# Patient Record
Sex: FEMALE | Race: BLACK OR AFRICAN AMERICAN | NOT HISPANIC OR LATINO | Employment: UNEMPLOYED | URBAN - METROPOLITAN AREA
[De-identification: names, ages, dates, MRNs, and addresses within clinical notes are randomized per-mention and may not be internally consistent; named-entity substitution may affect disease eponyms.]

---

## 2017-01-10 ENCOUNTER — OFFICE VISIT (OUTPATIENT)
Dept: DERMATOLOGY | Facility: CLINIC | Age: 47
End: 2017-01-10
Payer: COMMERCIAL

## 2017-01-10 VITALS — HEART RATE: 76 BPM | SYSTOLIC BLOOD PRESSURE: 125 MMHG | DIASTOLIC BLOOD PRESSURE: 79 MMHG | OXYGEN SATURATION: 99 %

## 2017-01-10 DIAGNOSIS — L71.0 DERMATITIS, PERIORAL: Primary | ICD-10-CM

## 2017-01-10 PROCEDURE — 99203 OFFICE O/P NEW LOW 30 MIN: CPT | Performed by: PHYSICIAN ASSISTANT

## 2017-01-10 RX ORDER — DESONIDE 0.5 MG/G
CREAM TOPICAL
Qty: 60 G | Refills: 1 | Status: SHIPPED | OUTPATIENT
Start: 2017-01-10 | End: 2017-10-09

## 2017-01-10 NOTE — NURSING NOTE
"Initial /79 mmHg  Pulse 76  SpO2 99% Estimated body mass index is 26.95 kg/(m^2) as calculated from the following:    Height as of 12/7/16: 1.626 m (5' 4.02\").    Weight as of 12/7/16: 71.26 kg (157 lb 1.6 oz). .      "

## 2017-01-10 NOTE — MR AVS SNAPSHOT
After Visit Summary   1/10/2017    Delio Agrawal    MRN: [id number]           Patient Information     Date Of Birth          1970        Visit Information        Provider Department      1/10/2017 2:30 PM Sarahi Dominguez PA-C [facility]        Today's Diagnoses     Dermatitis, perioral    -  1       Care Instructions    Directions for perioral dermatitis:    1. Wash with Dove morning and night  2. Apply desonide cream to area around mouth - morning and night  3. Use CeraVe cream, Cetaphil cream or Vanicream over this - use this to face and body indefinitely        Follow-ups after your visit        Your next 10 appointments already scheduled     Alan 10, 2017  2:30 PM   New Visit with Sarahi Dominguez PA-C   [facility] ([facility])    [address] [place] [id number] [phone]            Jan 12, 2017  3:00 PM   (Arrive by 2:45 PM)   MA DIAGNOSTIC DIGITAL RIGHT with [email or web address]   [facility] ([facility])    [address] [place] [id number] [phone]           Do not use any powder, lotion or deodorant under your arms or on your breast. If you do, we will ask you to remove it before your exam.  Wear comfortable, two-piece clothing.  If you have any allergies, tell your care team.  Bring any previous mammograms from other facilities or have them mailed to the breast center.            Jan 12, 2017  3:30 PM   US BREAST RIGHT LIMITED 1-3 QUAD with [email or web address]   [facility] ([facility])    [address] [place] [id number] [phone]           Please bring a list of your medicines (including vitamins, minerals and over-the-counter drugs). Also, tell your doctor about any allergies you may have. Wear comfortable clothes and leave your valuables at home.  You do not need  "to do anything special to prepare for your exam.  Please call the Imaging Department at your exam site with any questions.              Who to contact     If you have questions or need follow up information about today's clinic visit or your schedule please contact [facility] directly at [phone].  Normal or non-critical lab and imaging results will be communicated to you by MyChart, letter or phone within 4 business days after the clinic has received the results. If you do not hear from us within 7 days, please contact the clinic through MyChart or phone. If you have a critical or abnormal lab result, we will notify you by phone as soon as possible.  Submit refill requests through [employer] or call your pharmacy and they will forward the refill request to us. Please allow 3 business days for your refill to be completed.          Additional Information About Your Visit        MyChart Information     [employer] lets you send messages to your doctor, view your test results, renew your prescriptions, schedule appointments and more. To sign up, go to [email or web address] . Click on \"Log in\" on the left side of the screen, which will take you to the Welcome page. Then click on \"Sign up Now\" on the right side of the page.     You will be asked to enter the access code listed below, as well as some personal information. Please follow the directions to create your username and password.     Your access code is: [email or web address]  Expires: 2017  9:30 AM     Your access code will  in 90 days. If you need help or a new code, please call your [facility] clinic or [phone].        Care EveryWhere ID     This is your Care EveryWhere ID. This could be used by other organizations to access your [facility] medical records  [id number]        Your Vitals Were     Pulse Pulse Oximetry                76 99%           Blood Pressure from Last 3 Encounters:   01/10/17 125/79   16 109/66   16 " 122/70    Weight from Last 3 Encounters:   12/07/16 71.26 kg (157 lb 1.6 oz)   06/01/16 68.584 kg (151 lb 3.2 oz)   03/17/16 69.264 kg (152 lb 11.2 oz)              Today, you had the following     No orders found for display         Today's Medication Changes          These changes are accurate as of: 1/10/17  2:25 PM.  If you have any questions, ask your nurse or doctor.               Start taking these medicines.        Dose/Directions    desonide 0.05 % cream   Commonly known as:  DESOWEN   Used for:  Dermatitis, perioral   Started by:  Sarahi Dominguez PA-C        Apply to face BID x 1 week then d/c   Quantity:  60 g   Refills:  1            Where to get your medicines      These medications were sent to [facility] [place], [place] - [address]  [address], [facility] [address]     Phone:  [phone]    - desonide 0.05 % cream             Primary Care Provider Office Phone # Fax #    Margareth Bull [phone]       [facility] [address] [facility] [address]  [facility] [address]        Thank you!     Thank you for choosing [facility]  for your care. Our goal is always to provide you with excellent care. Hearing back from our patients is one way we can continue to improve our services. Please take a few minutes to complete the written survey that you may receive in the mail after your visit with us. Thank you!             Your Updated Medication List - Protect others around you: Learn how to safely use, store and throw away your medicines at www.disposemymeds.org.          This list is accurate as of: 1/10/17  2:25 PM.  Always use your most recent med list.                   Brand Name Dispense Instructions for use    desonide 0.05 % cream    DESOWEN    60 g    Apply to face BID x 1 week then d/c

## 2017-01-10 NOTE — PATIENT INSTRUCTIONS
Directions for perioral dermatitis:    1. Wash with Dove morning and night  2. Apply desonide cream to area around mouth - morning and night  3. Use CeraVe cream, Cetaphil cream or Vanicream over this - use this to face and body indefinitely

## 2017-01-12 NOTE — PROGRESS NOTES
HPI:   Delio Agrawal is a 46 year old female who presents for evaluation of rash on face  chief complaint  Location: lower face and can be on forehead as well. Is flaky and itchy   Condition present for:  For 1 year - will flare but never get completely better.   Previous treatments include: none  -Moved here from [place] 1 year ago - used to have [place]ese oil readily available and would apply this to her skin.  -Here today with  who is a surgical tech at [employer]    Review Of Systems  Eyes: negative  Ears/Nose/Throat: negative  Respiratory: No shortness of breath, dyspnea on exertion, cough, or hemoptysis  Cardiovascular: negative  Gastrointestinal: negative  Genitourinary: negative  Musculoskeletal: negative  Neurologic: negative  Psychiatric: negative        PHYSICAL EXAM:      Skin exam performed as follows: Type 2 skin. Mood appropriate  Alert and Oriented X 3. Well developed, well nourished in no distress.  General appearance: Normal  Head including face: Normal  Eyes: conjunctiva and lids: Normal  Mouth: Lips, teeth, gums: Normal  Neck: Normal  Chest-breast/axillae: Normal  Back: Normal  Spleen and liver: Normal  Cardiovascular: Exam of peripheral vascular system by observation for swelling, varicosities, edema: Normal  Genitalia: groin, buttocks: Normal  Extremities: digits/nails (clubbing): Normal  Eccrine and Apocrine glands: Normal  Right upper extremity: Normal  Left upper extremity: Normal  Right lower extremity: Normal  Left lower extremity: Normal  Skin: Scalp and body hair: See below    1. Perioral papular eruption and xerosis    ASSESSMENT/PLAN:     Perioral dermatitis - advised on diagnosis and treatment options. Has tried nothing in the past. Discussed PO and topical medications. Would like to start with topicals first; if no better will consider prednisone and/or doxy.   --Start desonide cream BID x 1 week then d/c  --Start CeraVe cream daily to face and body  --Continue washing with  Dove soap BID          Follow-up: 3-4 weeks/PRN sooner  CC:   Scribed By: Sarahi Dominguez, MS, PA-C

## 2017-01-24 ENCOUNTER — TELEPHONE (OUTPATIENT)
Dept: MAMMOGRAPHY | Facility: CLINIC | Age: 47
End: 2017-01-24

## 2017-05-11 ENCOUNTER — OFFICE VISIT (OUTPATIENT)
Dept: URGENT CARE | Facility: URGENT CARE | Age: 47
End: 2017-05-11
Payer: COMMERCIAL

## 2017-05-11 VITALS
WEIGHT: 164 LBS | SYSTOLIC BLOOD PRESSURE: 114 MMHG | HEART RATE: 85 BPM | OXYGEN SATURATION: 98 % | BODY MASS INDEX: 28.14 KG/M2 | TEMPERATURE: 97.1 F | DIASTOLIC BLOOD PRESSURE: 70 MMHG

## 2017-05-11 DIAGNOSIS — H61.22 LEFT EAR IMPACTED CERUMEN: ICD-10-CM

## 2017-05-11 DIAGNOSIS — R42 VERTIGO: Primary | ICD-10-CM

## 2017-05-11 PROCEDURE — 99214 OFFICE O/P EST MOD 30 MIN: CPT | Performed by: EMERGENCY MEDICINE

## 2017-05-11 RX ORDER — MECLIZINE HYDROCHLORIDE 25 MG/1
25 TABLET ORAL EVERY 6 HOURS PRN
Qty: 30 TABLET | Refills: 0 | Status: SHIPPED | OUTPATIENT
Start: 2017-05-11 | End: 2017-10-09

## 2017-05-11 NOTE — MR AVS SNAPSHOT
After Visit Summary   5/11/2017    Delio Agrawal    MRN: [id number]           Patient Information     Date Of Birth          1970        Visit Information        Provider Department      5/11/2017 8:30 PM Fransisco Ortiz MD [facility]        Today's Diagnoses     Vertigo    -  1    Left ear impacted cerumen          Care Instructions      Earwax (Treated)    Everyone produces earwax from the lining of the ear canal. It lubricates and protects the ear. The wax that forms in the canal slowly moves toward the outside of the ear and falls out. Sometimes wax can build up in the ear canal. This can cause a blockage and loss of hearing. A buildup of earwax was removed from your ear today.  Home care  If you have a tendency to build up wax in the ear canal, you should clear the wax at home regularly, before it causes discomfort. This should be about once every six months.    Unless a medicine was prescribed, you may use an over-the-counter product made for clearing earwax. These contain carbamide peroxide and are available over-the-counter in a kit with a small bulb syringe.    Lie down with the blocked ear facing upward. Apply one dropper full of medicine and wait a few minutes. Grasp the outer ear and wiggle it to help the solution enter the canal.    Lean over a sink or basin with the blocked ear turned downward. Use a rubber bulb syringe filled with warm (not hot or cold) water to rinse the ear several times. Use gentle pressure only. You may need to repeat the irrigation several times before the wax flows out.    If you are having trouble draining all the water out of your ear canal, put a few drops of rubbing alcohol into the ear canal. This will help remove the remaining water.  Don'ts    Don t use cold water to rinse the ear. This will make you dizzy.    Don t perform this procedure if you have an ear infection (ear pain, fever, or fluid draining from the ear).    Don t  perform this procedure if you have a punctured eardrum.    Don t use cotton swabs, matches, hairpins, keys, or other objects to  clean  the ear canal. This can cause infection of the ear canal or rupture of the eardrum. Because of their size and shape, cotton swabs can push the earwax deeper into the ear canal instead of removing it.  Follow-up care  Follow up with your health care provider, or as advised.  When to seek medical advice  Call your health care provider right away if any of these occur:    Worsening ear pain    Fever of 101 F (38.3 C) or higher, or as directed by your health care provider    Hearing does not return to normal after three days of treatment    Fluid drainage or bleeding from the ear canal    Swelling, redness, or tenderness of the outer ear    Headache, neck pain, or stiff neck    7522-5692 [employer]. [address] [place] [address]. All rights reserved. This information is not intended as a substitute for professional medical care. Always follow your healthcare professional's instructions.        Inner Ear Problems: Causes of Dizziness (Vertigo)  Benign positional vertigo (BPV)    This is the most common cause of vertigo. BPV (also called benign positional paroxysmal vertigo or BPPV) results when crystals in the canals shift into the wrong position. Episodes usually occur when the head is moved in a certain way. This can happen when turning in bed, bending, or looking up.  BPV:    Causes episodes of vertigo that last for seconds. These episodes can occur several times a day, depending on body position.    Doesn t cause hearing loss.    Often goes away on its own, but may go away sooner with treatment.  Infection or inflammation    Sometimes the semicircular canals swell and send incorrect balance signals. This problem may be caused by a viral infection. Depending on the cause, hearing can be affected (labyrinthitis) or can remain normal (neuronitis).   Infection  or inflammation:    Causes episodes of vertigo that last for hours or days. The first episode is usually the worst.    Can cause hearing loss.    Often goes away on its own, but may go away sooner with treatment.    May require vestibular rehabilitation if you have persistent imbalance.  Meniere s disease    Although uncommon, this condition happens when there is too much fluid in the canals. This causes increased pressure and swelling, and affects balance and hearing signals.  Meniere s disease may:    Cause episodes of vertigo that last for hours.    Cause fluctuating hearing problems, usually in one ear, that worsen over time.    Cause buzzing or ringing in the ears (tinnitus).    Cause a feeling of fullness or pressure in the ear.    Cause any of these symptoms: vertigo, hearing loss, tinnitus, or ear fullness to last a lifetime.    4646-0922 [employer]. [address] [place] [address]. All rights reserved. This information is not intended as a substitute for professional medical care. Always follow your healthcare professional's instructions.        Impacted Earwax  Impacted earwax is a buildup of the natural wax in the ear (cerumen). Impacted earwax is very common. It can cause symptoms such as hearing loss. It can also stop a doctor doing an exam of your ear.  Understanding earwax  Tiny glands in your ear make substances that combine with dead skin cells to form earwax. Earwax helps protect your ear canal from water, dirt, infection, and injury. Over time, earwax travels from the inner part of your ear canal to the entrance of the canal. Then it falls away naturally. But in some cases, it can t travel to the entrance of the canal. This may be because of a health condition or objects put in the ear. With age, earwax tends to become harder and less fluid. Older adults are more likely to have problems with earwax buildup.  What causes impacted earwax?  Earwax can build up because of many  health conditions. Some cause a physical blockage. Others cause too much earwax to be made. Health conditions that can cause earwax buildup include:    Bony blockage in the ear (osteoma or exostoses)    Infections, such as swimmer s ear (external otitis)    Skin disease, such as eczema    Autoimmune diseases, such as lupus    A narrowed ear canal from birth, chronic inflammation, or injury    Too much earwax because of injury    Too much earwax because of  water in the ear canal  Objects repeatedly placed in the ear can also cause impacted earwax. For example, putting cotton swabs in the ear may push the wax deeper into the ear. Over time, this may cause blockage. Hearing aids, swimming plugs, and swim molds can cause the same problem when used again and again.  In some cases, the cause of impacted earwax is not known.  Symptoms of impacted earwax  Excess earwax usually does not cause any symptoms, unless there is a large amount of buildup. Then it may cause symptoms such as:    Hearing loss    Earache    Sense of ear fullness    Itching in the ear    Dizziness    Ringing in the ears    Cough  Treatment for impacted earwax  If you don t have symptoms, you may not need treatment. Often the earwax goes away on its own with time. If you have symptoms, you may have 1 or more treatments such as:    Ear drops. These help to soften the earwax. This helps it leave the ear over time.    Rinsing (irrigation) of the ear canal with water. This is done in a doctor s office.    Removal of the earwax with small tools. This is also done in a doctor s office.  In rare cases, some treatments for earwax removal may cause complications such as:    Swimmer s ear (otitis external)    Earache    Short-term hearing loss    Dizziness    Water trapped in the ear canal    Hole in the eardrum    Ringing in the ears    Bleeding from the ear  Talk with your health care provider about which risks apply most to you.  Don t use these at  "home  Health care providers do not advise use of ear candles or ear vacuum kits. These methods are not shown to work.   Preventing impacted earwax  You may not be able to prevent impacted earwax if you have a health condition that causes it, such as eczema. In other cases, you may be able to prevent earwax buildup by:    Using ear drops once a week    Having routine cleaning of the ear about every 6 months    Not using cotton swabs in the ear  When to call the health care provider  Call your health care provider right away if you have severe symptoms after earwax removal. These may include bleeding or severe ear pain.        2260-2844 [employer]. [address] [place] [address]. All rights reserved. This information is not intended as a substitute for professional medical care. Always follow your healthcare professional's instructions.              Follow-ups after your visit        Who to contact     If you have questions or need follow up information about today's clinic visit or your schedule please contact [facility] directly at [phone].  Normal or non-critical lab and imaging results will be communicated to you by MyChart, letter or phone within 4 business days after the clinic has received the results. If you do not hear from us within 7 days, please contact the clinic through [employer] or phone. If you have a critical or abnormal lab result, we will notify you by phone as soon as possible.  Submit refill requests through [employer] or call your pharmacy and they will forward the refill request to us. Please allow 3 business days for your refill to be completed.          Additional Information About Your Visit        [employer] Information     [employer] lets you send messages to your doctor, view your test results, renew your prescriptions, schedule appointments and more. To sign up, go to [email or web address] . Click on \"Log in\" on the left side of the screen, " "which will take you to the Welcome page. Then click on \"Sign up Now\" on the right side of the page.     You will be asked to enter the access code listed below, as well as some personal information. Please follow the directions to create your username and password.     Your access code is: [email or web address]  Expires: 2017  9:42 PM     Your access code will  in 90 days. If you need help or a new code, please call your [facility] or [phone].        Care EveryWhere ID     This is your Care EveryWhere ID. This could be used by other organizations to access your [facility] medical records  [id number]        Your Vitals Were     Pulse Temperature Last Period Pulse Oximetry BMI (Body Mass Index)       85 97.1  F (36.2  C) (Tympanic) 2017 98% 28.14 kg/m2        Blood Pressure from Last 3 Encounters:   17 114/70   01/10/17 125/79   16 109/66    Weight from Last 3 Encounters:   17 164 lb (74.4 kg)   16 157 lb 1.6 oz (71.3 kg)   16 151 lb 3.2 oz (68.6 kg)              Today, you had the following     No orders found for display         Today's Medication Changes          These changes are accurate as of: 17  9:42 PM.  If you have any questions, ask your nurse or doctor.               Start taking these medicines.        Dose/Directions    meclizine 25 MG tablet   Commonly known as:  ANTIVERT   Used for:  Vertigo   Started by:  Fransisco Ortiz MD        Dose:  25 mg   Take 1 tablet (25 mg) by mouth every 6 hours as needed for dizziness   Quantity:  30 tablet   Refills:  0            Where to get your medicines      Some of these will need a paper prescription and others can be bought over the counter.  Ask your nurse if you have questions.     Bring a paper prescription for each of these medications     meclizine 25 MG tablet                Primary Care Provider Office Phone # Fax #    Margareth Bull [phone]       [address]  [facility] [address]        Thank you!     Thank you for choosing [facility]  for your care. Our goal is always to provide you with excellent care. Hearing back from our patients is one way we can continue to improve our services. Please take a few minutes to complete the written survey that you may receive in the mail after your visit with us. Thank you!             Your Updated Medication List - Protect others around you: Learn how to safely use, store and throw away your medicines at www.disposemymeds.org.          This list is accurate as of: 5/11/17  9:42 PM.  Always use your most recent med list.                   Brand Name Dispense Instructions for use    desonide 0.05 % cream    DESOWEN    60 g    Apply to face BID x 1 week then d/c       meclizine 25 MG tablet    ANTIVERT    30 tablet    Take 1 tablet (25 mg) by mouth every 6 hours as needed for dizziness

## 2017-05-12 ASSESSMENT — ENCOUNTER SYMPTOMS
NAUSEA: 1
FATIGUE: 0
TINGLING: 1
FOCAL WEAKNESS: 0
CHILLS: 0
WEAKNESS: 0
TREMORS: 0
FEVER: 0
SPEECH CHANGE: 0
ANOREXIA: 0
VOMITING: 0
VISUAL CHANGE: 0
DIZZINESS: 1
SORE THROAT: 0
SENSORY CHANGE: 0
VERTIGO: 1
DIAPHORESIS: 0
COUGH: 0

## 2017-05-12 NOTE — NURSING NOTE
"Chief Complaint   Patient presents with     Urgent Care     Pt in clinic to have eval for right ear pain , chills, and dizziness.     Ear Problem     Dizziness     Sweats       Initial /70 (BP Location: Right arm, Patient Position: Chair, Cuff Size: Adult Large)  Pulse 85  Temp 97.1  F (36.2  C) (Tympanic)  Wt 164 lb (74.4 kg)  LMP 05/11/2017  SpO2 98%  BMI 28.14 kg/m2 Estimated body mass index is 28.14 kg/(m^2) as calculated from the following:    Height as of 12/7/16: 5' 4.02\" (1.626 m).    Weight as of this encounter: 164 lb (74.4 kg).  Medication Reconciliation: complete   Nubia Long/ MA    "

## 2017-05-12 NOTE — PROGRESS NOTES
Ear Problem   This is a new (right sided ear pain on occasion.) problem. The current episode started in the past 7 days. The problem occurs daily. The problem has been waxing and waning. Associated symptoms include nausea and vertigo. Pertinent negatives include no anorexia, chest pain, chills, congestion, coughing, diaphoresis, fatigue, fever, rash, sore throat, visual change, vomiting or weakness. Nothing aggravates the symptoms. She has tried nothing for the symptoms.   Dizziness   This is a new problem. The current episode started in the past 7 days. The problem occurs daily. The problem has been waxing and waning. Associated symptoms include nausea and vertigo. Pertinent negatives include no anorexia, chest pain, chills, congestion, coughing, diaphoresis, fatigue, fever, rash, sore throat, visual change, vomiting or weakness. The symptoms are aggravated by bending (turning her head). She has tried nothing for the symptoms.       Past Medical History:   Diagnosis Date     Goiter      Subclinical hyperthyroidism        No past surgical history on file.    Social History     Social History     Marital status:      Spouse name: N/A     Number of children: N/A     Years of education: N/A     Occupational History     Not on file.     Social History Main Topics     Smoking status: Never Smoker     Smokeless tobacco: Never Used     Alcohol use No     Drug use: No     Sexual activity: Yes     Partners: Male     Other Topics Concern     Parent/Sibling W/ Cabg, Mi Or Angioplasty Before 65f 55m? No     Social History Narrative    How much exercise per week? Walking a lot during the week.    How much calcium per day? Organic foods only       How much caffeine per day? 0    How much vitamin D per day? Organic foods only     Do you/your family wear seatbelts?  Yes    Do you/your family use safety helmets? No    Do you/your family use sunscreen? No    Do you/your family keep firearms in the home? No    Do you/your  family have a smoke detector(s)? Yes        Do you feel safe in your home? Yes    Has anyone ever touched you in an unwanted manner? No                    Family History   Problem Relation Age of Onset     Thyroid Disease Sister      surgery x 2     Thyroid Disease Maternal Aunt      surgery complicated by loss of voice.   days later         Review of Systems   Constitutional: Negative for chills, diaphoresis, fatigue and fever.   HENT: Positive for ear pain. Negative for congestion, ear discharge and sore throat.    Respiratory: Negative for cough.    Cardiovascular: Negative for chest pain.   Gastrointestinal: Positive for nausea. Negative for anorexia and vomiting.   Musculoskeletal: Negative for joint pain.   Skin: Negative for itching and rash.   Neurological: Positive for dizziness, vertigo and tingling (forehead). Negative for tremors, sensory change, speech change, focal weakness and weakness.       /70 (BP Location: Right arm, Patient Position: Chair, Cuff Size: Adult Large)  Pulse 85  Temp 97.1  F (36.2  C) (Tympanic)  Wt 164 lb (74.4 kg)  LMP 2017  SpO2 98%  BMI 28.14 kg/m2    Physical Exam   Constitutional: She is oriented to person, place, and time and well-developed, well-nourished, and in no distress. No distress.   HENT:   Head: Normocephalic and atraumatic.   Right Ear: Tympanic membrane, external ear and ear canal normal.   Left Ear: Tympanic membrane, external ear and ear canal normal.   Mouth/Throat: Oropharynx is clear and moist. No oropharyngeal exudate.   Left cerumen plug present     Eyes: EOM are normal. Pupils are equal, round, and reactive to light.   Neck: Normal range of motion.   Cardiovascular: Normal rate.    Pulmonary/Chest: Effort normal.   Musculoskeletal: Normal range of motion.   Neurological: She is alert and oriented to person, place, and time. No cranial nerve deficit. She exhibits normal muscle tone. Gait normal. Coordination normal.   Skin: Skin is warm  and dry. She is not diaphoretic.   Psychiatric: Affect and judgment normal.   Nursing note and vitals reviewed.      Delio was seen today for urgent care, ear problem, dizziness and sweats.    Diagnoses and all orders for this visit:    Vertigo  -     meclizine (ANTIVERT) 25 MG tablet; Take 1 tablet (25 mg) by mouth every 6 hours as needed for dizziness    Left ear impacted cerumen      I think that she has some BPPV that is worsened with head movement.  She has no focal neurological findings today that I notice.  Recommend trying the meclizine and f/u with primary care if not getting better.  If worsening symptoms with weakness, loss of use, severe headaches recommend that she go to the ER.  She agreed to plans.    Fransisco Ortiz MD

## 2017-05-12 NOTE — PATIENT INSTRUCTIONS
Earwax (Treated)    Everyone produces earwax from the lining of the ear canal. It lubricates and protects the ear. The wax that forms in the canal slowly moves toward the outside of the ear and falls out. Sometimes wax can build up in the ear canal. This can cause a blockage and loss of hearing. A buildup of earwax was removed from your ear today.  Home care  If you have a tendency to build up wax in the ear canal, you should clear the wax at home regularly, before it causes discomfort. This should be about once every six months.    Unless a medicine was prescribed, you may use an over-the-counter product made for clearing earwax. These contain carbamide peroxide and are available over-the-counter in a kit with a small bulb syringe.    Lie down with the blocked ear facing upward. Apply one dropper full of medicine and wait a few minutes. Grasp the outer ear and wiggle it to help the solution enter the canal.    Lean over a sink or basin with the blocked ear turned downward. Use a rubber bulb syringe filled with warm (not hot or cold) water to rinse the ear several times. Use gentle pressure only. You may need to repeat the irrigation several times before the wax flows out.    If you are having trouble draining all the water out of your ear canal, put a few drops of rubbing alcohol into the ear canal. This will help remove the remaining water.  Don'ts    Don t use cold water to rinse the ear. This will make you dizzy.    Don t perform this procedure if you have an ear infection (ear pain, fever, or fluid draining from the ear).    Don t perform this procedure if you have a punctured eardrum.    Don t use cotton swabs, matches, hairpins, keys, or other objects to  clean  the ear canal. This can cause infection of the ear canal or rupture of the eardrum. Because of their size and shape, cotton swabs can push the earwax deeper into the ear canal instead of removing it.  Follow-up care  Follow up with your health care  provider, or as advised.  When to seek medical advice  Call your health care provider right away if any of these occur:    Worsening ear pain    Fever of 101 F (38.3 C) or higher, or as directed by your health care provider    Hearing does not return to normal after three days of treatment    Fluid drainage or bleeding from the ear canal    Swelling, redness, or tenderness of the outer ear    Headache, neck pain, or stiff neck    4703-1030 [employer]. [address] [place] [address]. All rights reserved. This information is not intended as a substitute for professional medical care. Always follow your healthcare professional's instructions.        Inner Ear Problems: Causes of Dizziness (Vertigo)  Benign positional vertigo (BPV)    This is the most common cause of vertigo. BPV (also called benign positional paroxysmal vertigo or BPPV) results when crystals in the canals shift into the wrong position. Episodes usually occur when the head is moved in a certain way. This can happen when turning in bed, bending, or looking up.  BPV:    Causes episodes of vertigo that last for seconds. These episodes can occur several times a day, depending on body position.    Doesn t cause hearing loss.    Often goes away on its own, but may go away sooner with treatment.  Infection or inflammation    Sometimes the semicircular canals swell and send incorrect balance signals. This problem may be caused by a viral infection. Depending on the cause, hearing can be affected (labyrinthitis) or can remain normal (neuronitis).   Infection or inflammation:    Causes episodes of vertigo that last for hours or days. The first episode is usually the worst.    Can cause hearing loss.    Often goes away on its own, but may go away sooner with treatment.    May require vestibular rehabilitation if you have persistent imbalance.  Meniere s disease    Although uncommon, this condition happens when there is too much fluid in  the canals. This causes increased pressure and swelling, and affects balance and hearing signals.  Meniere s disease may:    Cause episodes of vertigo that last for hours.    Cause fluctuating hearing problems, usually in one ear, that worsen over time.    Cause buzzing or ringing in the ears (tinnitus).    Cause a feeling of fullness or pressure in the ear.    Cause any of these symptoms: vertigo, hearing loss, tinnitus, or ear fullness to last a lifetime.    8820-9077 [employer]. [address] [place] [address]. All rights reserved. This information is not intended as a substitute for professional medical care. Always follow your healthcare professional's instructions.        Impacted Earwax  Impacted earwax is a buildup of the natural wax in the ear (cerumen). Impacted earwax is very common. It can cause symptoms such as hearing loss. It can also stop a doctor doing an exam of your ear.  Understanding earwax  Tiny glands in your ear make substances that combine with dead skin cells to form earwax. Earwax helps protect your ear canal from water, dirt, infection, and injury. Over time, earwax travels from the inner part of your ear canal to the entrance of the canal. Then it falls away naturally. But in some cases, it can t travel to the entrance of the canal. This may be because of a health condition or objects put in the ear. With age, earwax tends to become harder and less fluid. Older adults are more likely to have problems with earwax buildup.  What causes impacted earwax?  Earwax can build up because of many health conditions. Some cause a physical blockage. Others cause too much earwax to be made. Health conditions that can cause earwax buildup include:    Bony blockage in the ear (osteoma or exostoses)    Infections, such as swimmer s ear (external otitis)    Skin disease, such as eczema    Autoimmune diseases, such as lupus    A narrowed ear canal from birth, chronic inflammation,  or injury    Too much earwax because of injury    Too much earwax because of  water in the ear canal  Objects repeatedly placed in the ear can also cause impacted earwax. For example, putting cotton swabs in the ear may push the wax deeper into the ear. Over time, this may cause blockage. Hearing aids, swimming plugs, and swim molds can cause the same problem when used again and again.  In some cases, the cause of impacted earwax is not known.  Symptoms of impacted earwax  Excess earwax usually does not cause any symptoms, unless there is a large amount of buildup. Then it may cause symptoms such as:    Hearing loss    Earache    Sense of ear fullness    Itching in the ear    Dizziness    Ringing in the ears    Cough  Treatment for impacted earwax  If you don t have symptoms, you may not need treatment. Often the earwax goes away on its own with time. If you have symptoms, you may have 1 or more treatments such as:    Ear drops. These help to soften the earwax. This helps it leave the ear over time.    Rinsing (irrigation) of the ear canal with water. This is done in a doctor s office.    Removal of the earwax with small tools. This is also done in a doctor s office.  In rare cases, some treatments for earwax removal may cause complications such as:    Swimmer s ear (otitis external)    Earache    Short-term hearing loss    Dizziness    Water trapped in the ear canal    Hole in the eardrum    Ringing in the ears    Bleeding from the ear  Talk with your health care provider about which risks apply most to you.  Don t use these at home  Health care providers do not advise use of ear candles or ear vacuum kits. These methods are not shown to work.   Preventing impacted earwax  You may not be able to prevent impacted earwax if you have a health condition that causes it, such as eczema. In other cases, you may be able to prevent earwax buildup by:    Using ear drops once a week    Having routine cleaning of the ear about  every 6 months    Not using cotton swabs in the ear  When to call the health care provider  Call your health care provider right away if you have severe symptoms after earwax removal. These may include bleeding or severe ear pain.        1378-4680 [employer]. [address] [place] [address], [place] [address]. All rights reserved. This information is not intended as a substitute for professional medical care. Always follow your healthcare professional's instructions.

## 2017-05-12 NOTE — PROGRESS NOTES
Spoke with  this AM and he reports that his wife feels completely better today.  NO headaches or dizziness or ear pain.  F/u as needed.    Fransisco Ortiz MD

## 2017-05-20 ENCOUNTER — RADIANT APPOINTMENT (OUTPATIENT)
Dept: GENERAL RADIOLOGY | Facility: CLINIC | Age: 47
End: 2017-05-20
Attending: FAMILY MEDICINE
Payer: COMMERCIAL

## 2017-05-20 ENCOUNTER — OFFICE VISIT (OUTPATIENT)
Dept: URGENT CARE | Facility: URGENT CARE | Age: 47
End: 2017-05-20
Payer: COMMERCIAL

## 2017-05-20 VITALS
DIASTOLIC BLOOD PRESSURE: 70 MMHG | SYSTOLIC BLOOD PRESSURE: 96 MMHG | TEMPERATURE: 98.5 F | HEART RATE: 86 BPM | OXYGEN SATURATION: 99 %

## 2017-05-20 DIAGNOSIS — E55.9 VITAMIN D DEFICIENCY: ICD-10-CM

## 2017-05-20 DIAGNOSIS — M54.2 NECK PAIN: ICD-10-CM

## 2017-05-20 DIAGNOSIS — R53.83 FATIGUE, UNSPECIFIED TYPE: ICD-10-CM

## 2017-05-20 DIAGNOSIS — R20.2 PARESTHESIA: ICD-10-CM

## 2017-05-20 DIAGNOSIS — H92.01 OTALGIA, RIGHT: Primary | ICD-10-CM

## 2017-05-20 LAB
ERYTHROCYTE [DISTWIDTH] IN BLOOD BY AUTOMATED COUNT: 12.6 % (ref 10–15)
HCT VFR BLD AUTO: 41.7 % (ref 35–47)
HGB BLD-MCNC: 14.2 G/DL (ref 11.7–15.7)
MCH RBC QN AUTO: 32.1 PG (ref 26.5–33)
MCHC RBC AUTO-ENTMCNC: 34.1 G/DL (ref 31.5–36.5)
MCV RBC AUTO: 94 FL (ref 78–100)
PLATELET # BLD AUTO: 165 10E9/L (ref 150–450)
RBC # BLD AUTO: 4.43 10E12/L (ref 3.8–5.2)
WBC # BLD AUTO: 5.8 10E9/L (ref 4–11)

## 2017-05-20 PROCEDURE — 99214 OFFICE O/P EST MOD 30 MIN: CPT | Performed by: FAMILY MEDICINE

## 2017-05-20 PROCEDURE — 85027 COMPLETE CBC AUTOMATED: CPT | Performed by: FAMILY MEDICINE

## 2017-05-20 PROCEDURE — 80053 COMPREHEN METABOLIC PANEL: CPT | Performed by: FAMILY MEDICINE

## 2017-05-20 PROCEDURE — 72040 X-RAY EXAM NECK SPINE 2-3 VW: CPT

## 2017-05-20 PROCEDURE — 82306 VITAMIN D 25 HYDROXY: CPT | Performed by: FAMILY MEDICINE

## 2017-05-20 PROCEDURE — 36415 COLL VENOUS BLD VENIPUNCTURE: CPT | Performed by: FAMILY MEDICINE

## 2017-05-20 RX ORDER — CYCLOBENZAPRINE HCL 5 MG
5 TABLET ORAL EVERY 8 HOURS PRN
Qty: 30 TABLET | Refills: 0 | Status: SHIPPED | OUTPATIENT
Start: 2017-05-20 | End: 2017-10-09

## 2017-05-20 NOTE — NURSING NOTE
"Chief Complaint   Patient presents with     Urgent Care     Ear Problem     Was here 5/11/17 for vertigo and had left ear flushed.  3 days ago right ear started hurting, now has pain below ear in neck and radiating around right occipital area to top of head, feels like and electric tingling pain.     Fatigue     Notes has been feeling fatgiued this past week; wonders if iron level could be low.     Initial BP 96/70  Pulse 86  Temp 98.5  F (36.9  C) (Oral)  LMP 05/11/2017  SpO2 99% Estimated body mass index is 28.14 kg/(m^2) as calculated from the following:    Height as of 12/7/16: 5' 4.02\" (1.626 m).    Weight as of 5/11/17: 164 lb (74.4 kg)..  BP completed using cuff size: regular  ADIS James  "

## 2017-05-20 NOTE — MR AVS SNAPSHOT
After Visit Summary   5/20/2017    Delio Agrawal    MRN: [id number]           Patient Information     Date Of Birth          1970        Visit Information        Provider Department      5/20/2017 6:10 PM Stuart Dee MD [facility]        Today's Diagnoses     Otalgia, right    -  1    Fatigue, unspecified type        Vitamin D deficiency        Paresthesia        Neck pain          Care Instructions    Okay to take flexeril to help with muscle spasm.  Okay for tylenol and ibuprofen for discomfort.  Warm compress and massage as needed.    Okay to try sudafed to help with eustachian tube dysfunction.  Caution due to possible elevation in blood pressure in the setting of hyperthyroid.    We will notify you if the labs are abnormal.          Neck Spasm    A spasm of the neck muscles can happen after a sudden awkward neck movement. Sleeping with your neck in a crooked position can also cause spasm. Some people respond to emotional stress by tensing the muscles of their neck, shoulders, and upper back. If neck spasm lasts long enough, it can cause headache.  The treatment described below will usually help the pain to go away in 5 to 7 days. Pain that continues may need further evaluation or other types of treatment such as physical therapy.  Home care    Rest and relax the muscles. Use a comfortable pillow that supports the head and keeps the spine in a neutral position. The position of the head should not be tilted forward or backward. A rolled up towel may help for a custom fit.    Some people find relief with heat. Heat can be applied with either a warm shower or bath or a moist towel heated in the microwave and massage. Others prefer cold packs. You can make an ice pack by filling a plastic bag that seals at the top with ice cubes or crushed ice and then wrapping it with a thin towel. Try both and use the method that feels best for 15 to 20 minutes, several times a  day.    Whether using ice or heat, be careful that you do not injure your skin. Never put ice directly on the skin. Always wrap the ice in a towel or other type of cloth. This is very important, especially in people with poor skin sensations.     Try to reduce your stress level. Emotional stress can lead to neck muscle tension and get in the way of or delay the healing process.    You may use over-the-counter pain medicine to control pain, unless another medicine was prescribed.If you have chronic liver or kidney disease or ever had a stomach ulcer or GI bleeding, talk with your healthcare provider before using these medicines.  Follow-up care  Follow up with your healthcare provider if your symptoms do not show signs of improvement after one week. Physical therapy or further tests may be needed.  If X-rays, CT scans, or MRI scans were taken, you will be told of any new findings that may affect your care.  Call 911  Call 911 if you have:    Sudden weakness or numbness in one or both arms    Neck swelling, difficulty or painful swallowing    Difficulty breathing    Chest pain  When to seek medical advice  Call your healthcare provider right away if any of these occur:    Pain becomes worse or spreads into one or both arms    Increasing headache with nausea or vomiting    Fever of 100.4 F (38 C) or above lasting for 24 to 48 hours    7196-8566 [employer]. [address] [place] [address], [place] [address]. All rights reserved. This information is not intended as a substitute for professional medical care. Always follow your healthcare professional's instructions.        Managing Fatigue  Fatigue is common. It can be caused by worry, lack of sleep, or poor appetite. Fatigue can also be a sign of anemia, a shortage of red blood cells. You might need medical treatment for anemia. The tips below can help you feel better.     Family members can help with meals and chores around the house.   Conserving energy    Keep  track of the times of day when you are most tired and plan around them. For instance, if you are more tired in the afternoon, try to get tasks done in the morning.    Decide which tasks are most important. Do those first.    Pass tasks along to others when you need to. Ask for help.    Accept help when it s offered. Tell people what they can do to help. For instance, you may need someone to fix a meal, fold clothes, or put gas in your car.    Plan rest times. You may want to take a nap each day. Just sitting quietly for a few minutes can make you feel more rested.  What you can do to feel better    Relax before you try to sleep. Take a bath or read for a while.    Form a sleep pattern. Go to bed at the same time each night and get up at the same time each morning.    Eat well. Choose foods from all of the food groups each day.    Exercise. Take a brisk walk to help increase your energy.    Avoid caffeine and alcohol. Drink plenty of water or fruit juices instead.  Treating anemia  If you begin to feel more tired than normal, tell your doctor. Fatigue could be a sign of anemia. This problem is fairly common in cancer patients, especially during chemotherapy and radiation treatments. If your red blood cell count is too low, you may get a blood transfusion. In some cases, you may need medicine to increase the number of red blood cells your body makes.  When to call your healthcare provider  Call your healthcare provider if you have:    Shortness of breath or chest pain    A dizzy feeling when you get up from lying or sitting down    Paler skin than normal    Extreme tiredness that is not helped by sleep     7627-5014 [employer]. [address] [place] [address], [place] [address]. All rights reserved. This information is not intended as a substitute for professional medical care. Always follow your healthcare professional's instructions.        Paraesthesias  Paraesthesia refers to a burning or prickling sensation  "that is sometimes felt in the hands, arms, legs or feet. It can also occur in other parts of the body. It can also feel like tingling or numbness, skin crawling, or itching. The feeling is not comfortable, but it is not painful. (The \"pins and needles\" feeling that happens when a foot or hand \"falls asleep\" is a temporary paraesthesia.)  Paraesthesias that last or come and go may be caused by medical issues that need to be treated. These include stroke, bulging disk (pressing on a nerve), a trapped nerve), vitamin deficiencies, or even certain medicines.  Tests are often done. These tests may include blood tests, X-ray, CT (computerized tomography) scan, or a muscle test (electromyography). Depending on the cause, treatment may include physical therapy.  Home care    Tell the healthcare provider about all medicines you take. This includes prescription and over-the-counter medicines, vitamins, and herbs. Ask if any of the medicines may be causing your problems. Do not make any changes to prescription medicines without talking to your healthcare provider first.    You may be prescribed medicines to help relieve the tingling feeling or for pain. Take all medicines as directed.    A numb hand or foot may be more prone to injury. To help protect it:    Always use oven mitts.    Test water with an unaffected hand or foot.    Use caution when trimming nails. File sharp areas.    Wear shoes that fit well to avoid pressure points, blisters, and ulcers.    Inspect your hands and feet carefully (including the soles of your feet and between your toes) at least once a week. If you see red areas, sores, or other problems, tell your healthcare provider.  Follow-up care  Follow up with your doctor or as advised by our staff. You may need further testing or evaluation.  When to seek medical advice  Call your healthcare provider right away if any of the following occur:    Numbness or weakness of the face, one arm, or one " leg    Slurred speech, confusion, trouble speaking, walking, or seeing    Severe headache, fainting spell, dizziness, or seizure    Chest, arm, neck, or upper back pain    Loss of bladder or bowel control    Open wound with redness, swelling, or pus    2726-5081 [employer]. [address] [place] [address]. All rights reserved. This information is not intended as a substitute for professional medical care. Always follow your healthcare professional's instructions.        Degenerative Disk Disease    Spinal disks are gel-filled cushions between the bones, or vertebrae, of the spine. The disks act like shock absorbers. Over time, the disks may break down. This is called degenerative disk disease.  This condition can affect the neck or back. It is one of the most common causes of low back pain. It is the leading cause of disability in people under age 45 in the [place]. The pain often remains localized to the lower back or neck. Muscle spasm is often present and adds to the pain.  Disk degeneration is a natural part of aging. But it is not painful for most people. It may also occur as a result of repeated minor injuries due to daily activities, sports, or accidents. It may lead to osteoarthritis of the spine. Back pain related to disk disease may come and go. Or it may become chronic and last for months or years. The disk may bulge or rupture. This is called a slipped disk or herniated disk. That can put pressure on a nearby spinal nerve and cause neck or back pain that spreads down one arm or leg.  X-rays or an MRI may help to diagnose this condition. For acute pain, treatment includes anti-inflammatory medicines, muscle relaxants, rest, ice, or heat. Strong prescription pain medicines, called opioids, may be needed for short-term treatment if pain suddenly gets worse. Opioid medicines can be addictive. So they are not advised for long-term pain management. Other types of medicines are  preferred. Surgery is generally not used to treat this condition unless there is a complication.    Home care    For neck pain: Use a comfortable pillow that supports the head and keeps the spine in a neutral position. Your head should not be tilted forward or backward.    For back pain: Avoid sitting for long periods of time. This puts more stress on the lower back than standing or walking. Starting a regular exercise program to strengthen the supporting muscles of the spine will make it easier to live with degenerative disk disease.    Apply an ice pack over the injured area for no more than 15 to 20 minutes. Do this every 3 to 6 hours for the first 24 to 48 hours. To make an ice pack, put ice cubes in a plastic bag that seals at the top. Wrap the bag in a clean, thin towel or cloth. Never put ice or an ice pack directly on the skin. Keep using ice packs to ease pain and swelling as needed. After 48 hours, apply heat (warm shower or warm bath) for 20 minutes several times a day, or switch between ice and heat.     You may use over-the-counter pain medicine to control pain, unless another pain medicine was prescribed. If you have chronic liver or kidney disease or ever had a stomach ulcer or GI bleeding, talk with your provider before using these medicines.  Follow-up care  Follow up with your healthcare provider, or as directed.  If X-rays, a CT scan or an MRI were done, you will be notified of any new findings that may affect your care.  When to seek medical advice  Contact your healthcare provider right away if any of these occur:    Increasing back pain    Your foot drags when you walk, a condition called foot drop    You have new weakness, numbness, or pain in one or both arms or legs    Loss of bowel or bladder control    Numbness or tingling in the buttock or groin area    2672-7300 [employer]. [address] [place] [address], [place] [address]. All rights reserved. This information is not intended as  "a substitute for professional medical care. Always follow your healthcare professional's instructions.              Follow-ups after your visit        Who to contact     If you have questions or need follow up information about today's clinic visit or your schedule please contact [facility] directly at [phone].  Normal or non-critical lab and imaging results will be communicated to you by MyChart, letter or phone within 4 business days after the clinic has received the results. If you do not hear from us within 7 days, please contact the clinic through [employer] or phone. If you have a critical or abnormal lab result, we will notify you by phone as soon as possible.  Submit refill requests through [employer] or call your pharmacy and they will forward the refill request to us. Please allow 3 business days for your refill to be completed.          Additional Information About Your Visit        MyChart Information     [employer] lets you send messages to your doctor, view your test results, renew your prescriptions, schedule appointments and more. To sign up, go to [email or web address] . Click on \"Log in\" on the left side of the screen, which will take you to the Welcome page. Then click on \"Sign up Now\" on the right side of the page.     You will be asked to enter the access code listed below, as well as some personal information. Please follow the directions to create your username and password.     Your access code is: [email or web address]  Expires: 2017  9:42 PM     Your access code will  in 90 days. If you need help or a new code, please call your [facility] clinic or [phone].        Care EveryWhere ID     This is your Care EveryWhere ID. This could be used by other organizations to access your [facility] medical records  [id number]        Your Vitals Were     Pulse Temperature Last Period Pulse Oximetry          86 98.5  F (36.9  C) (Oral) 2017 99%         Blood Pressure from " Last 3 Encounters:   05/20/17 96/70   05/11/17 114/70   01/10/17 125/79    Weight from Last 3 Encounters:   05/11/17 164 lb (74.4 kg)   12/07/16 157 lb 1.6 oz (71.3 kg)   06/01/16 151 lb 3.2 oz (68.6 kg)              We Performed the Following     CBC with platelets     Comprehensive metabolic panel (BMP + Alb, Alk Phos, ALT, AST, Total. Bili, TP)     Vitamin D Deficiency          Today's Medication Changes          These changes are accurate as of: 5/20/17  7:46 PM.  If you have any questions, ask your nurse or doctor.               Start taking these medicines.        Dose/Directions    cyclobenzaprine 5 MG tablet   Commonly known as:  FLEXERIL   Used for:  Neck pain   Started by:  Stuart Dee MD        Dose:  5 mg   Take 1 tablet (5 mg) by mouth every 8 hours as needed for muscle spasms   Quantity:  30 tablet   Refills:  0            Where to get your medicines      Some of these will need a paper prescription and others can be bought over the counter.  Ask your nurse if you have questions.     Bring a paper prescription for each of these medications     cyclobenzaprine 5 MG tablet                Primary Care Provider Office Phone # Fax #    Margareth Bull [phone]       [address] [address]        Thank you!     Thank you for choosing [facility]  for your care. Our goal is always to provide you with excellent care. Hearing back from our patients is one way we can continue to improve our services. Please take a few minutes to complete the written survey that you may receive in the mail after your visit with us. Thank you!             Your Updated Medication List - Protect others around you: Learn how to safely use, store and throw away your medicines at www.disposemymeds.org.          This list is accurate as of: 5/20/17  7:46 PM.  Always use your most recent med list.                   Brand Name Dispense Instructions for use     ADVIL PO          cyclobenzaprine 5 MG tablet    FLEXERIL    30 tablet    Take 1 tablet (5 mg) by mouth every 8 hours as needed for muscle spasms       desonide 0.05 % cream    DESOWEN    60 g    Apply to face BID x 1 week then d/c       meclizine 25 MG tablet    ANTIVERT    30 tablet    Take 1 tablet (25 mg) by mouth every 6 hours as needed for dizziness       VITAMIN D (CHOLECALCIFEROL) PO      Take 4,000 Units by mouth

## 2017-05-20 NOTE — PROGRESS NOTES
"SUBJECTIVE:   Delio Agrawal is a 47 year old female presenting with a chief complaint of ear pain right.  Onset of symptoms was 3 day(s) ago.  Course of illness is same.    Severity moderate  Current and Associated symptoms: ear pain, fatigue  Treatment measures tried include Fluids, OTC meds and Rest.  Predisposing factors include : thyroid dysfunction.    Patient states that ear hurts when she pushes around the area, will get a stabbing, electric like pain sensation that goes up to head, denies pain going across the cheek.  No fever, no recent URI symptoms.  Patient has goiter and hyperthyroid, plan is for surgical removal.  Patient endorse more fatigue, worried that may be anemic again.  Denies any trauma to neck area but states that has pain in back of neck, has a \"trigger point\" area that is painful when touches.    Patient has vit d deficiency, states that is taking vit d supplementation.  Denies any cough or sinus pressure.    Past Medical History:   Diagnosis Date     Goiter      Subclinical hyperthyroidism      Current Outpatient Prescriptions   Medication Sig Dispense Refill     Ibuprofen (ADVIL PO)        VITAMIN D, CHOLECALCIFEROL, PO Take 4,000 Units by mouth       meclizine (ANTIVERT) 25 MG tablet Take 1 tablet (25 mg) by mouth every 6 hours as needed for dizziness (Patient not taking: Reported on 5/20/2017) 30 tablet 0     desonide (DESOWEN) 0.05 % cream Apply to face BID x 1 week then d/c (Patient not taking: Reported on 5/11/2017) 60 g 1     Social History   Substance Use Topics     Smoking status: Never Smoker     Smokeless tobacco: Never Used     Alcohol use No       ROS:  CONSTITUTIONAL:NEGATIVE for fever, chills, change in weight and POSITIVE  for fatigue and malaise  INTEGUMENTARY/SKIN: NEGATIVE for worrisome rashes, moles or lesions  ENT/MOUTH: POSITIVE for ear pain right  RESP:NEGATIVE for significant cough or SOB  CV: NEGATIVE for chest pain, palpitations or peripheral edema  GI: NEGATIVE " for nausea, abdominal pain, heartburn, or change in bowel habits  : abnormal menstrual cycles, NEGATIVE for dysuria and frequency   MUSCULOSKELETAL: POSITIVE  for neck pain and paresthesias  NEURO: POSITIVE for paresthesias   ENDOCRINE: POSITIVE  for HX goiter, HX thyroid disease and paresthesias  PSYCHIATRIC: NEGATIVE for changes in mood or affect    OBJECTIVE  :BP 96/70  Pulse 86  Temp 98.5  F (36.9  C) (Oral)  LMP 05/11/2017  SpO2 99%  GENERAL APPEARANCE: healthy, alert and no distress  EYES: EOMI,  PERRL, conjunctiva clear  HENT: ear canals and TM's normal.  Nose and mouth without ulcers, erythema or lesions.  No TMJ discomfort, no clicks  NECK: supple, mild tenderness on right upper trapezius, no lymphadenopathy.  Large goiter  RESP: lungs clear to auscultation - no rales, rhonchi or wheezes  CV: regular rates and rhythm, normal S1 S2, no murmur noted  Extremities: no peripheral edema or tenderness, peripheral pulses normal  NEURO: Normal strength and tone, sensory exam grossly normal,  normal speech and mentation  SKIN: no suspicious lesions or rashes  PSYCH: mentation appears normal and affect normal/bright    Results for orders placed or performed in visit on 05/20/17   CBC with platelets   Result Value Ref Range    WBC 5.8 4.0 - 11.0 10e9/L    RBC Count 4.43 3.8 - 5.2 10e12/L    Hemoglobin 14.2 11.7 - 15.7 g/dL    Hematocrit 41.7 35.0 - 47.0 %    MCV 94 78 - 100 fl    MCH 32.1 26.5 - 33.0 pg    MCHC 34.1 31.5 - 36.5 g/dL    RDW 12.6 10.0 - 15.0 %    Platelet Count 165 150 - 450 10e9/L     XRAY - cervical spine - no acute fracture, loss of normal cervical lordosis    ASSESSMENT/PLAN:  (H92.01) Otalgia, right  (primary encounter diagnosis)  Comment: eustachian tube  Plan: monitor, sudafed    (R53.83) Fatigue, unspecified type  Plan: CBC with platelets, Comprehensive metabolic         panel (BMP + Alb, Alk Phos, ALT, AST, Total.         Bili, TP)            (E55.9) Vitamin D deficiency  Plan: Vitamin D  Deficiency            (R20.2) Paresthesia  Plan: Comprehensive metabolic panel (BMP + Alb, Alk         Phos, ALT, AST, Total. Bili, TP)            (M54.2) Neck pain   Views, cyclobenzaprine         (FLEXERIL) 5 MG tablet            Reassurance given, reviewed initial reassuring result, will follow up on pending labs and notify if any abnormalities.  Discussed that neck pain may be due to musculoskeletal etiology and trigger point, trial of flexeril to help with symptoms and obtain new pillow.  Reviewed that nerve-like discomfort may be due to cervical spine etiology and if not improving with muscle relaxant that will require further workup.  Reviewed symptomatic treatment, massage, heat and ice to area of discomfort.    Reviewed different etiology for fatigue, will follow up on pending labs and notify if abnormal.  Reviewed ear pain, reassurance given that does not have acute otitis media.  Can try sudafed to help with eustachian tube dysfunction, caution due to possible elevation in BP.    Follow up with primary provider if no resolution of symptoms.    Stuart Dee MD

## 2017-05-20 NOTE — LETTER
[facility]  [address], [place]  [address]  [phone]        May 24, 2017      Delio Tanja  [address]  [facility] [address]          Dear Ms. Agrawal,    The results of your recent lab tests were within normal limits. Enclosed is a copy of these results.  If you have any further questions or problems, please contact our office.    Sincerely,      [facility]    Resulted Orders   CBC with platelets   Result Value Ref Range    WBC 5.8 4.0 - 11.0 10e9/L    RBC Count 4.43 3.8 - 5.2 10e12/L    Hemoglobin 14.2 11.7 - 15.7 g/dL    Hematocrit 41.7 35.0 - 47.0 %    MCV 94 78 - 100 fl    MCH 32.1 26.5 - 33.0 pg    MCHC 34.1 31.5 - 36.5 g/dL    RDW 12.6 10.0 - 15.0 %    Platelet Count 165 150 - 450 10e9/L   Vitamin D Deficiency   Result Value Ref Range    Vitamin D Deficiency screening 45 20 - 75 ug/L      Comment:      Season, race, dietary intake, and treatment affect the concentration of   25-hydroxy-Vitamin D. Values may decrease during winter months and increase   during summer months. Values 20-29 ug/L may indicate Vitamin D insufficiency   and values <20 ug/L may indicate Vitamin D deficiency.   Vitamin D determination is routinely performed by an immunoassay specific for   25 hydroxyvitamin D3.  If an individual is on vitamin D2 (ergocalciferol)   supplementation, please specify 25 OH vitamin D2 and D3 level determination   by   LCMSMS test [id number].     Comprehensive metabolic panel (BMP + Alb, Alk Phos, ALT, AST, Total. Bili, TP)   Result Value Ref Range    Sodium 140 133 - 144 mmol/L    Potassium 4.1 3.4 - 5.3 mmol/L    Chloride 106 94 - 109 mmol/L    Carbon Dioxide 31 20 - 32 mmol/L    Anion Gap 3 3 - 14 mmol/L    Glucose 82 70 - 99 mg/dL    Urea Nitrogen 12 7 - 30 mg/dL    Creatinine 0.68 0.52 - 1.04 mg/dL    GFR Estimate >90  Non  GFR Calc   >60 mL/min/1.7m2    GFR Estimate If Black >90   GFR Calc   >60  mL/min/1.7m2    Calcium 8.5 8.5 - 10.1 mg/dL    Bilirubin Total 0.4 0.2 - 1.3 mg/dL    Albumin 3.8 3.4 - 5.0 g/dL    Protein Total 7.8 6.8 - 8.8 g/dL    Alkaline Phosphatase 60 40 - 150 U/L    ALT 23 0 - 50 U/L    AST 20 0 - 45 U/L

## 2017-05-21 LAB
ALBUMIN SERPL-MCNC: 3.8 G/DL (ref 3.4–5)
ALP SERPL-CCNC: 60 U/L (ref 40–150)
ALT SERPL W P-5'-P-CCNC: 23 U/L (ref 0–50)
ANION GAP SERPL CALCULATED.3IONS-SCNC: 3 MMOL/L (ref 3–14)
AST SERPL W P-5'-P-CCNC: 20 U/L (ref 0–45)
BILIRUB SERPL-MCNC: 0.4 MG/DL (ref 0.2–1.3)
BUN SERPL-MCNC: 12 MG/DL (ref 7–30)
CALCIUM SERPL-MCNC: 8.5 MG/DL (ref 8.5–10.1)
CHLORIDE SERPL-SCNC: 106 MMOL/L (ref 94–109)
CO2 SERPL-SCNC: 31 MMOL/L (ref 20–32)
CREAT SERPL-MCNC: 0.68 MG/DL (ref 0.52–1.04)
GFR SERPL CREATININE-BSD FRML MDRD: NORMAL ML/MIN/1.7M2
GLUCOSE SERPL-MCNC: 82 MG/DL (ref 70–99)
POTASSIUM SERPL-SCNC: 4.1 MMOL/L (ref 3.4–5.3)
PROT SERPL-MCNC: 7.8 G/DL (ref 6.8–8.8)
SODIUM SERPL-SCNC: 140 MMOL/L (ref 133–144)

## 2017-05-21 NOTE — PATIENT INSTRUCTIONS
Okay to take flexeril to help with muscle spasm.  Okay for tylenol and ibuprofen for discomfort.  Warm compress and massage as needed.    Okay to try sudafed to help with eustachian tube dysfunction.  Caution due to possible elevation in blood pressure in the setting of hyperthyroid.    We will notify you if the labs are abnormal.          Neck Spasm    A spasm of the neck muscles can happen after a sudden awkward neck movement. Sleeping with your neck in a crooked position can also cause spasm. Some people respond to emotional stress by tensing the muscles of their neck, shoulders, and upper back. If neck spasm lasts long enough, it can cause headache.  The treatment described below will usually help the pain to go away in 5 to 7 days. Pain that continues may need further evaluation or other types of treatment such as physical therapy.  Home care    Rest and relax the muscles. Use a comfortable pillow that supports the head and keeps the spine in a neutral position. The position of the head should not be tilted forward or backward. A rolled up towel may help for a custom fit.    Some people find relief with heat. Heat can be applied with either a warm shower or bath or a moist towel heated in the microwave and massage. Others prefer cold packs. You can make an ice pack by filling a plastic bag that seals at the top with ice cubes or crushed ice and then wrapping it with a thin towel. Try both and use the method that feels best for 15 to 20 minutes, several times a day.    Whether using ice or heat, be careful that you do not injure your skin. Never put ice directly on the skin. Always wrap the ice in a towel or other type of cloth. This is very important, especially in people with poor skin sensations.     Try to reduce your stress level. Emotional stress can lead to neck muscle tension and get in the way of or delay the healing process.    You may use over-the-counter pain medicine to control pain, unless another  medicine was prescribed.If you have chronic liver or kidney disease or ever had a stomach ulcer or GI bleeding, talk with your healthcare provider before using these medicines.  Follow-up care  Follow up with your healthcare provider if your symptoms do not show signs of improvement after one week. Physical therapy or further tests may be needed.  If X-rays, CT scans, or MRI scans were taken, you will be told of any new findings that may affect your care.  Call 911  Call 911 if you have:    Sudden weakness or numbness in one or both arms    Neck swelling, difficulty or painful swallowing    Difficulty breathing    Chest pain  When to seek medical advice  Call your healthcare provider right away if any of these occur:    Pain becomes worse or spreads into one or both arms    Increasing headache with nausea or vomiting    Fever of 100.4 F (38 C) or above lasting for 24 to 48 hours    2237-6785 [employer]. [address] [place] [address]. All rights reserved. This information is not intended as a substitute for professional medical care. Always follow your healthcare professional's instructions.        Managing Fatigue  Fatigue is common. It can be caused by worry, lack of sleep, or poor appetite. Fatigue can also be a sign of anemia, a shortage of red blood cells. You might need medical treatment for anemia. The tips below can help you feel better.     Family members can help with meals and chores around the house.   Conserving energy    Keep track of the times of day when you are most tired and plan around them. For instance, if you are more tired in the afternoon, try to get tasks done in the morning.    Decide which tasks are most important. Do those first.    Pass tasks along to others when you need to. Ask for help.    Accept help when it s offered. Tell people what they can do to help. For instance, you may need someone to fix a meal, fold clothes, or put gas in your car.    Plan rest  "times. You may want to take a nap each day. Just sitting quietly for a few minutes can make you feel more rested.  What you can do to feel better    Relax before you try to sleep. Take a bath or read for a while.    Form a sleep pattern. Go to bed at the same time each night and get up at the same time each morning.    Eat well. Choose foods from all of the food groups each day.    Exercise. Take a brisk walk to help increase your energy.    Avoid caffeine and alcohol. Drink plenty of water or fruit juices instead.  Treating anemia  If you begin to feel more tired than normal, tell your doctor. Fatigue could be a sign of anemia. This problem is fairly common in cancer patients, especially during chemotherapy and radiation treatments. If your red blood cell count is too low, you may get a blood transfusion. In some cases, you may need medicine to increase the number of red blood cells your body makes.  When to call your healthcare provider  Call your healthcare provider if you have:    Shortness of breath or chest pain    A dizzy feeling when you get up from lying or sitting down    Paler skin than normal    Extreme tiredness that is not helped by sleep     9634-8525 [employer]. [address] [place] [address]. All rights reserved. This information is not intended as a substitute for professional medical care. Always follow your healthcare professional's instructions.        Paraesthesias  Paraesthesia refers to a burning or prickling sensation that is sometimes felt in the hands, arms, legs or feet. It can also occur in other parts of the body. It can also feel like tingling or numbness, skin crawling, or itching. The feeling is not comfortable, but it is not painful. (The \"pins and needles\" feeling that happens when a foot or hand \"falls asleep\" is a temporary paraesthesia.)  Paraesthesias that last or come and go may be caused by medical issues that need to be treated. These include " stroke, bulging disk (pressing on a nerve), a trapped nerve), vitamin deficiencies, or even certain medicines.  Tests are often done. These tests may include blood tests, X-ray, CT (computerized tomography) scan, or a muscle test (electromyography). Depending on the cause, treatment may include physical therapy.  Home care    Tell the healthcare provider about all medicines you take. This includes prescription and over-the-counter medicines, vitamins, and herbs. Ask if any of the medicines may be causing your problems. Do not make any changes to prescription medicines without talking to your healthcare provider first.    You may be prescribed medicines to help relieve the tingling feeling or for pain. Take all medicines as directed.    A numb hand or foot may be more prone to injury. To help protect it:    Always use oven mitts.    Test water with an unaffected hand or foot.    Use caution when trimming nails. File sharp areas.    Wear shoes that fit well to avoid pressure points, blisters, and ulcers.    Inspect your hands and feet carefully (including the soles of your feet and between your toes) at least once a week. If you see red areas, sores, or other problems, tell your healthcare provider.  Follow-up care  Follow up with your doctor or as advised by our staff. You may need further testing or evaluation.  When to seek medical advice  Call your healthcare provider right away if any of the following occur:    Numbness or weakness of the face, one arm, or one leg    Slurred speech, confusion, trouble speaking, walking, or seeing    Severe headache, fainting spell, dizziness, or seizure    Chest, arm, neck, or upper back pain    Loss of bladder or bowel control    Open wound with redness, swelling, or pus    3429-3130 The [employer]. [address] [place] [address]. All rights reserved. This information is not intended as a substitute for professional medical care. Always follow your  healthcare professional's instructions.        Degenerative Disk Disease    Spinal disks are gel-filled cushions between the bones, or vertebrae, of the spine. The disks act like shock absorbers. Over time, the disks may break down. This is called degenerative disk disease.  This condition can affect the neck or back. It is one of the most common causes of low back pain. It is the leading cause of disability in people under age 45 in the [place]. The pain often remains localized to the lower back or neck. Muscle spasm is often present and adds to the pain.  Disk degeneration is a natural part of aging. But it is not painful for most people. It may also occur as a result of repeated minor injuries due to daily activities, sports, or accidents. It may lead to osteoarthritis of the spine. Back pain related to disk disease may come and go. Or it may become chronic and last for months or years. The disk may bulge or rupture. This is called a slipped disk or herniated disk. That can put pressure on a nearby spinal nerve and cause neck or back pain that spreads down one arm or leg.  X-rays or an MRI may help to diagnose this condition. For acute pain, treatment includes anti-inflammatory medicines, muscle relaxants, rest, ice, or heat. Strong prescription pain medicines, called opioids, may be needed for short-term treatment if pain suddenly gets worse. Opioid medicines can be addictive. So they are not advised for long-term pain management. Other types of medicines are preferred. Surgery is generally not used to treat this condition unless there is a complication.    Home care    For neck pain: Use a comfortable pillow that supports the head and keeps the spine in a neutral position. Your head should not be tilted forward or backward.    For back pain: Avoid sitting for long periods of time. This puts more stress on the lower back than standing or walking. Starting a regular exercise program to strengthen the  supporting muscles of the spine will make it easier to live with degenerative disk disease.    Apply an ice pack over the injured area for no more than 15 to 20 minutes. Do this every 3 to 6 hours for the first 24 to 48 hours. To make an ice pack, put ice cubes in a plastic bag that seals at the top. Wrap the bag in a clean, thin towel or cloth. Never put ice or an ice pack directly on the skin. Keep using ice packs to ease pain and swelling as needed. After 48 hours, apply heat (warm shower or warm bath) for 20 minutes several times a day, or switch between ice and heat.     You may use over-the-counter pain medicine to control pain, unless another pain medicine was prescribed. If you have chronic liver or kidney disease or ever had a stomach ulcer or GI bleeding, talk with your provider before using these medicines.  Follow-up care  Follow up with your healthcare provider, or as directed.  If X-rays, a CT scan or an MRI were done, you will be notified of any new findings that may affect your care.  When to seek medical advice  Contact your healthcare provider right away if any of these occur:    Increasing back pain    Your foot drags when you walk, a condition called foot drop    You have new weakness, numbness, or pain in one or both arms or legs    Loss of bowel or bladder control    Numbness or tingling in the buttock or groin area    4088-7017 [employer]. [address] [place] [address]. All rights reserved. This information is not intended as a substitute for professional medical care. Always follow your healthcare professional's instructions.

## 2017-05-22 LAB — DEPRECATED CALCIDIOL+CALCIFEROL SERPL-MC: 45 UG/L (ref 20–75)

## 2017-10-09 ENCOUNTER — OFFICE VISIT (OUTPATIENT)
Dept: ENDOCRINOLOGY | Facility: CLINIC | Age: 47
End: 2017-10-09

## 2017-10-09 VITALS — HEIGHT: 64 IN | BODY MASS INDEX: 27.66 KG/M2 | WEIGHT: 162 LBS

## 2017-10-09 DIAGNOSIS — E05.20 TOXIC MULTINODULAR GOITER: ICD-10-CM

## 2017-10-09 DIAGNOSIS — E05.20 TOXIC MULTINODULAR GOITER: Primary | ICD-10-CM

## 2017-10-09 DIAGNOSIS — E05.90 SUBCLINICAL HYPERTHYROIDISM: ICD-10-CM

## 2017-10-09 LAB
T3 SERPL-MCNC: 124 NG/DL (ref 60–181)
T4 FREE SERPL-MCNC: 1.3 NG/DL (ref 0.76–1.46)
TSH SERPL DL<=0.005 MIU/L-ACNC: <0.01 MU/L (ref 0.4–4)

## 2017-10-09 RX ORDER — METHIMAZOLE 5 MG/1
5 TABLET ORAL DAILY
Qty: 30 TABLET | Refills: 1 | Status: ON HOLD | OUTPATIENT
Start: 2017-10-09 | End: 2017-12-02

## 2017-10-09 ASSESSMENT — PAIN SCALES - GENERAL: PAINLEVEL: NO PAIN (0)

## 2017-10-09 NOTE — PATIENT INSTRUCTIONS
You should return to see me about 2-4 weeks after the surgery.    To expedite your medication refill(s), please contact your pharmacy and have them fax a refill request to: [phone].  *Please allow 3 business days for routine medication refills.  *Please allow 5 business days for controlled substance medication refills.  --------------------  For scheduling appointments (including lab work), please request an appointment through [employer], or call: [phone].    For questions for your provider or the endocrine nurse, please send a [employer] message.  For after-hours urgent issues, please dial [phone], and ask to speak with the Endocrinologist On-Call.  --------------------  Please Note: If you are active on [employer], all future test results will be sent by [employer] message only and will no longer be sent by mail. You may also receive communication directly from your physician.

## 2017-10-09 NOTE — MR AVS SNAPSHOT
After Visit Summary   10/9/2017    Delio Agrawal    MRN: [id number]           Patient Information     Date Of Birth          1970        Visit Information        Provider Department      10/9/2017 1:30 PM Birdie Proctor MD [facility]        Today's Diagnoses     Toxic multinodular goiter    -  1    Subclinical hyperthyroidism          Care Instructions    You should return to see me about 2-4 weeks after the surgery.    To expedite your medication refill(s), please contact your pharmacy and have them fax a refill request to: [phone].  *Please allow 3 business days for routine medication refills.  *Please allow 5 business days for controlled substance medication refills.  --------------------  For scheduling appointments (including lab work), please request an appointment through [employer], or call: [phone].    For questions for your provider or the endocrine nurse, please send a [employer] message.  For after-hours urgent issues, please dial [phone], and ask to speak with the Endocrinologist On-Call.  --------------------  Please Note: If you are active on [employer], all future test results will be sent by [employer] message only and will no longer be sent by mail. You may also receive communication directly from your physician.            Follow-ups after your visit        Your next 10 appointments already scheduled     Oct 09, 2017  2:00 PM CDT   LAB with [facility] ([facility])    [address] [place] [id number] [phone]           Patient must bring picture ID. Patient should be prepared to give a urine specimen  Please do not eat 10-12 hours before your appointment if you are coming in fasting for labs on lipids, cholesterol, or glucose (sugar). Pregnant women should follow their Care Team instructions. Water with medications is okay. Do not drink coffee or other fluids. If you have concerns about  taking  your medications, please ask at office or if scheduling via [employer], send a message by clicking on Secure Messaging, Message Your Care Team.            Oct 23, 2017  4:00 PM CDT   (Arrive by 3:45 PM)   New Patient Visit with Keyla Griffin MD   [facility] ([facility])    [address] [id number] [phone]              Future tests that were ordered for you today     Open Future Orders        Priority Expected Expires Ordered    TSH Routine  10/9/2018 10/9/2017    T4 free Routine  10/9/2018 10/9/2017    T3 total Routine  10/9/2018 10/9/2017            Who to contact     Please call your clinic at [phone] to:    Ask questions about your health    Make or cancel appointments    Discuss your medicines    Learn about your test results    Speak to your doctor   If you have compliments or concerns about an experience at your clinic, or if you wish to file a complaint, please contact [facility] Patient Relations at [phone] or email us at [email or web address]         Additional Information About Your Visit        [employer] Information     [employer] is an electronic gateway that provides easy, online access to your medical records. With [employer], you can request a clinic appointment, read your test results, renew a prescription or communicate with your care team.     To sign up for [facility] visit the website at [email or web address]   You will be asked to enter the access code listed below, as well as some personal information. Please follow the directions to create your username and password.     Your access code is: [email or web address]  Expires: 2017  6:30 AM     Your access code will  in 90 days. If you need help or a new code, please contact your [facility] or call [phone] for assistance.        Care EveryWhere ID     This is your Care  "EveryWhere ID. This could be used by other organizations to access your [employer] medical records  [id number]        Your Vitals Were     Height BMI (Body Mass Index)                1.626 m (5' 4\") 27.81 kg/m2           Blood Pressure from Last 3 Encounters:   10/09/17 (P) 114/75   05/20/17 96/70   05/11/17 114/70    Weight from Last 3 Encounters:   10/09/17 73.5 kg (162 lb)   05/11/17 74.4 kg (164 lb)   12/07/16 71.3 kg (157 lb 1.6 oz)                 Today's Medication Changes          These changes are accurate as of: 10/9/17  1:32 PM.  If you have any questions, ask your nurse or doctor.               Stop taking these medicines if you haven't already. Please contact your care team if you have questions.     ADVIL PO   Stopped by:  Birdie Proctor MD           cyclobenzaprine 5 MG tablet   Commonly known as:  FLEXERIL   Stopped by:  Birdie Proctor MD           desonide 0.05 % cream   Commonly known as:  DESOWEN   Stopped by:  Birdie Proctor MD           meclizine 25 MG tablet   Commonly known as:  ANTIVERT   Stopped by:  Birdie Proctor MD           VITAMIN D (CHOLECALCIFEROL) PO   Stopped by:  Birdie Proctor MD                    Primary Care Provider Office Phone # Fax #    Margareth Bull [phone]       XXX RESIGNED  [address] [address]        Equal Access to Services     [facility] AH: Hadii norma warner hadasho Soomaali, waaxda luqadaha, qaybta kaalmada adeegyada, asiya kidd. So [facility] [phone].    ATENCIÓN: Si habla español, tiene a clemente disposición servicios gratuitos de asistencia lingüística. Llame al [phone].    We comply with applicable federal civil rights laws and [place] laws. We do not discriminate on the basis of race, color, national origin, age, disability, sex, sexual orientation, or gender identity.            Thank you!     Thank you for choosing [facility] ENDOCRINOLOG[facility]  for your care. Our " goal is always to provide you with excellent care. Hearing back from our patients is one way we can continue to improve our services. Please take a few minutes to complete the written survey that you may receive in the mail after your visit with us. Thank you!             Your Updated Medication List - Protect others around you: Learn how to safely use, store and throw away your medicines at www.disposemymeds.org.      Notice  As of 10/9/2017  1:32 PM    You have not been prescribed any medications.

## 2017-10-09 NOTE — LETTER
10/9/2017       RE: Delio Agrawal  [address]  [place] [address]     Dear Colleague,    Thank you for referring your patient, Delio Agrawal, to the [facility] ENDOCR[facility]OLOG[facility] at [facility]. Please see a copy of my visit note below.    Endocrine Consult note    Attending Assessment/Plan :     1.  Hyperthyroidism, subclinical, 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 The right lobe also has activity so the whole gland would require treatment.  Repeat TFTS  Refer to Dr Keyla Griffin again. I have emphasized that if they don't get garland they need to contact us to facilitate this    2.  Bilateral thyroid nodules   A) possibly large right inferior calcified /shadowing nodule not described on the 4/16 US.  This needs follow up if not removed. - As per #1 . Total thyroidectomy would sufficiently address this.   B) Dominant 6 cm left thyroid nodule  - as per # 1 and 2A.    Overall would recommend surgery for definitive therapy - total thyroidectomy.  Doubt efficacy of 131I with 6 cm nodule on the left.    Birdie Proctor MD      Cc/ HISTORY OF PRESENT ILLNESS Delio returns for follow up of hyperthyroidism due to subclinical toxic MNG with  6 cm hot nodule on the left and without complete suppression of the right lobe.  She is from [place].  There is a family history of Thyroid surgery in her  sister and aunt. There is no known family  history of cancer.     We have TFTS dating to 4/3/15 when she had TSH 0.03, free T4 1.29.  On 3/30/16 ERNESTINA < 10, TSI < 1.   When I saw her last year I started her back on MMI in preparation for surgery and I sent her to see Dr Keyla Griffin. To date, she has not been seen Dr Griffni and she also has not had thyroid surgery.  Today they are telling me they never got an appt.  In addition, it sounds like perhaps there was an insurance deductible issue which has now been resolved.      Today she says she took the medicine for one month,  "until she ran out.  She had labs 12/7/16: TSH 0.39, free t4 0.97, .      There is no known radiation exposure.    I have reviewed the images on pACS  4/1/16 thyroid US.  Large left sided thyroid nodule 4.7 x 4.4 x 6 cm  Bilateral smaller nodules.  Right inferior posterior shadowing may indicate a large nodule is here - this si also the area that raised suspicion of cold nodule on the scan  4/19/16 123I thyroid uptake/scan;  Left sided nodule is functional/ hot but the right lobe is incompletely suppressed.  30.1% uptake. Possible cold nodule right inferior    REVIEW OF SYSTEMS  Feels OK  No problems  Weight is up and down  No neck symptoms except if she pushes on her throat  No chokiing on food  Sometimes feels FB sensation in the throat  Voice is OK  Cardiac: negative  Respiratory: negative  GI: constipation  Menses monthly.      Past Medical History  Past Medical History:   Diagnosis Date     Goiter      Subclinical hyperthyroidism      Medications  No current outpatient prescriptions on file.      Allergies  No Known Allergies    Family History  family history includes Thyroid Disease in her maternal aunt and sister.   Thyroid surgery in sister and aunt    Social History    Social History   Substance Use Topics     Smoking status: Never Smoker     Smokeless tobacco: Never Used     Alcohol use No     From [place].     Physical Exam  BP (P) 114/75  Pulse (P) 59  Ht 1.626 m (5' 4\")  Wt 73.5 kg (162 lb)  BMI 27.81 kg/m2  Body mass index is 27.81 kg/(m^2).  GENERAL :  Pleasant woman In no apparent distress.  Her  is present  SKIN: Normal color, temperature, texture.     EYES: PER,  No scleral icterus,  No proptosis, conjunctival redness, stare, retraction  NECK: visible mass fills /expands thyroid bed - doesn't apepar asymmetrical per se - ? Possibly slightly more prominent on the left.    THYROID: mass is larger on the left than right.  Left sided mass is at least 4 times normal lobe - approx 8 cm " mass on the left. No thyroid bruit  RESP: Lungs clear to auscultation bilaterally  CARDIAC: Regular rate and rhythm, normal S1 S2, without murmurs, rubs or gallops       NEURO: awake, alert, responds appropriately to questions  Moves all extremities; Gait normal.  No tremor of the outstretched hand.  DTRs  2 /4 ,   EXTREMITIES: No clubbing, cyanosis or edema.    DATA REVIEW    ENDO THYROID LABS-[facility] Latest Ref Rng & Units 12/7/2016 6/1/2016   TSH 0.40 - 4.00 mU/L 0.39 (L) <0.01 (L)   T4 FREE 0.76 - 1.46 ng/dL 0.97 1.44   TRIIODOTHYRONINE(T3) 60 - 181 ng/dL 104 102   THYR PEROXIDASE DARNELL <35 IU/mL     THYROID STIM IMMUNOG        [facility]   TSH 0.40 - 4.00 mU/L <0.01 (L)   T4 FREE 0.76 - 1.46 ng/dL 1.42   TRIIODOTHYRONINE(T3) 60 - 181 ng/dL 127   THYR PEROXIDASE DARNELL <35 IU/mL <10   THYROID STIM IMMUNOG  <1.0 . . .

## 2017-10-09 NOTE — NURSING NOTE
Chief Complaint   Patient presents with     RECHECK     F/U HYPERTHYROID     Teresa Canchola, ESPERANZA  Endocrinology & Diabetes 3G

## 2017-10-09 NOTE — PROGRESS NOTES
Endocrine Consult note    Attending Assessment/Plan :     1.  Hyperthyroidism, subclinical, 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 The right lobe also has activity so the whole gland would require treatment.  Repeat TFTS  Refer to Dr Keyla Griffin again. I have emphasized that if they don't get garland they need to contact us to facilitate this    2.  Bilateral thyroid nodules   A) possibly large right inferior calcified /shadowing nodule not described on the 4/16 US.  This needs follow up if not removed. - As per #1 . Total thyroidectomy would sufficiently address this.   B) Dominant 6 cm left thyroid nodule  - as per # 1 and 2A.    Overall would recommend surgery for definitive therapy - total thyroidectomy.  Doubt efficacy of 131I with 6 cm nodule on the left.    Birdie Proctor MD      Cc/ HISTORY OF PRESENT ILLNESS Delio returns for follow up of hyperthyroidism due to subclinical toxic MNG with  6 cm hot nodule on the left and without complete suppression of the right lobe.  She is from [place].  There is a family history of Thyroid surgery in her  sister and aunt. There is no known family  history of cancer.     We have TFTS dating to 4/3/15 when she had TSH 0.03, free T4 1.29.  On 3/30/16 ERNESTINA < 10, TSI < 1.   When I saw her last year I started her back on MMI in preparation for surgery and I sent her to see Dr Keyla Griffin. To date, she has not been seen Dr Griffin and she also has not had thyroid surgery.  Today they are telling me they never got an appt.  In addition, it sounds like perhaps there was an insurance deductible issue which has now been resolved.      Today she says she took the medicine for one month, until she ran out.  She had labs 12/7/16: TSH 0.39, free t4 0.97, .      There is no known radiation exposure.    I have reviewed the images on pACS  4/1/16 thyroid US.  Large left sided thyroid nodule 4.7 x 4.4 x 6 cm  Bilateral smaller nodules.  Right inferior posterior  "shadowing may indicate a large nodule is here - this si also the area that raised suspicion of cold nodule on the scan  4/19/16 123I thyroid uptake/scan;  Left sided nodule is functional/ hot but the right lobe is incompletely suppressed.  30.1% uptake. Possible cold nodule right inferior    REVIEW OF SYSTEMS  Feels OK  No problems  Weight is up and down  No neck symptoms except if she pushes on her throat  No chokiing on food  Sometimes feels FB sensation in the throat  Voice is OK  Cardiac: negative  Respiratory: negative  GI: constipation  Menses monthly.      Past Medical History  Past Medical History:   Diagnosis Date     Goiter      Subclinical hyperthyroidism      Medications  No current outpatient prescriptions on file.      Allergies  No Known Allergies    Family History  family history includes Thyroid Disease in her maternal aunt and sister.   Thyroid surgery in sister and aunt    Social History    Social History   Substance Use Topics     Smoking status: Never Smoker     Smokeless tobacco: Never Used     Alcohol use No     From [place].     Physical Exam  BP (P) 114/75  Pulse (P) 59  Ht 1.626 m (5' 4\")  Wt 73.5 kg (162 lb)  BMI 27.81 kg/m2  Body mass index is 27.81 kg/(m^2).  GENERAL :  Pleasant woman In no apparent distress.  Her  is present  SKIN: Normal color, temperature, texture.     EYES: PER,  No scleral icterus,  No proptosis, conjunctival redness, stare, retraction  NECK: visible mass fills /expands thyroid bed - doesn't apepar asymmetrical per se - ? Possibly slightly more prominent on the left.    THYROID: mass is larger on the left than right.  Left sided mass is at least 4 times normal lobe - approx 8 cm mass on the left. No thyroid bruit  RESP: Lungs clear to auscultation bilaterally  CARDIAC: Regular rate and rhythm, normal S1 S2, without murmurs, rubs or gallops       NEURO: awake, alert, responds appropriately to questions  Moves all extremities; Gait normal.  No tremor of the " outstretched hand.  DTRs  2 /4 ,   EXTREMITIES: No clubbing, cyanosis or edema.    DATA REVIEW    ENDO THYROID LABS-[facility] Latest Ref Rng & Units 12/7/2016 6/1/2016   TSH 0.40 - 4.00 mU/L 0.39 (L) <0.01 (L)   T4 FREE 0.76 - 1.46 ng/dL 0.97 1.44   TRIIODOTHYRONINE(T3) 60 - 181 ng/dL 104 102   THYR PEROXIDASE DARNELL <35 IU/mL     THYROID STIM IMMUNOG        ENDO THYROID LABS-[facility] Latest Ref Rng & Units 3/30/2016   TSH 0.40 - 4.00 mU/L <0.01 (L)   T4 FREE 0.76 - 1.46 ng/dL 1.42   TRIIODOTHYRONINE(T3) 60 - 181 ng/dL 127   THYR PEROXIDASE DARNELL <35 IU/mL <10   THYROID STIM IMMUNOG  <1.0 . . .

## 2017-10-16 ENCOUNTER — PRE VISIT (OUTPATIENT)
Dept: OTOLARYNGOLOGY | Facility: CLINIC | Age: 47
End: 2017-10-16

## 2017-10-16 NOTE — TELEPHONE ENCOUNTER
"APPT INFORMATION    Date /Time:  10/23/17 at 4PM   Reason for Appt:  thyroid goiter   Ref Provider/Clinic:  Birdie Proctor @ [facility]   Patient Contact (Y/N) & Call Details:  no, referred   Action:      RECORDS CLINIC NAME  (\"None\" if no records ) RECEIVED RECS & IMG? Y/N   (may include other helpful notes)   Internal Clinics:  [facility]  yes - records in [facility]        External Clinics:  none             "

## 2017-10-23 ENCOUNTER — OFFICE VISIT (OUTPATIENT)
Dept: OTOLARYNGOLOGY | Facility: CLINIC | Age: 47
End: 2017-10-23

## 2017-10-23 VITALS — BODY MASS INDEX: 30.21 KG/M2 | WEIGHT: 160 LBS | HEIGHT: 61 IN

## 2017-10-23 DIAGNOSIS — E05.90 HYPERTHYROIDISM: Primary | ICD-10-CM

## 2017-10-23 ASSESSMENT — PAIN SCALES - GENERAL: PAINLEVEL: NO PAIN (0)

## 2017-10-23 NOTE — LETTER
10/23/2017       RE: Delio Agrawal  [address]  [place] [address]     Dear Colleague,    Thank you for referring your patient, Delio Agrawal, to the [facility] EAR [facility] at [facility] Please see a copy of my visit note below.    REASON FOR CONSULTATION:  I was asked by Dr. Birdie Proctor to see this patient for toxic multinodular goiter with concerning nodule.      The patient presents today with her  present.      HISTORY OF PRESENT ILLNESS:  This is a 47-year-old female who had moved here from [place] several years ago.  She does have a family history of thyroid goiters in her sister and her aunt but no previous history or family history of papillary thyroid carcinoma.  She was noted to have a very large multinodular goiter, largest on the left measuring 6 cm in size.      Regarding the thyroid gland, she denies any problems with voice quality, inspiration or swallowing.  She has no symptoms of hypothyroidism but does indeed have symptoms consistent with her toxic multinodular goiter and some flushing heat intolerance and mild tachycardia.  She has again no family history of thyroid carcinoma but does have a family history of multinodular goiter in [place].      Radiographic images reveal the patient does have a multinodular goiter, largest nodule on the left measuring 6 cm in size.  Nuclear medicine scan has a slight increased uptake with a cold nodule on the right side.      PAST MEDICAL HISTORY, SURGICAL HISTORY AND MEDICATIONS:  Have been reviewed and are available in the EMR.      REVIEW OF SYSTEMS:  Ten-point review of systems is pertinent for that noted in the HPI.      PHYSICAL EXAMINATION:  Just inspecting the neck, she clearly has a large left thyroid mass that actually deviates the trachea to the right.  In palpating the thyroid gland, the superior and actually inferior borders of the thyroid gland are palpable with swallowing.  The  thyroid gland appears somewhat dense with multi-nodules present within it.  There is no obvious cervical lymphadenopathy.      Most recent laboratory data included a TSH of 0.01, T3 and T4 within normal limits.      ASSESSMENT:  Toxic multinodular goiter with very large left thyroid nodule at 6 cm.      PLAN:  Based on the above and the large left thyroid nodule as well as the concerning characteristics of a right thyroid nodule, I would recommend the patient undergo a total thyroid lobectomy.  The surgical procedure was discussed with the patient and her  including but not limited to the risks of bleeding, infection, injury to the recurrent laryngeal nerve or nerves, potential permanent hypocalcemia.  Because of the size of the nodule, the patient may need a drain and would likely spend the night because of this.  [facility] will [facility].         LETICIA FRAGOSO MD             D: 2017 11:12   T: 2017 14:51   MT: PENELOPE      Name:     ABDIEL ROSS   MRN:      [id number]        Account:      [id number]   :      1970           Service Date: 10/23/2017      Document: [id number]

## 2017-10-23 NOTE — MR AVS SNAPSHOT
After Visit Summary   10/23/2017    Delio Agrawal    MRN: [id number]           Patient Information     Date Of Birth          1970        Visit Information        Provider Department      10/23/2017 4:00 PM Keyla Griffin MD [facility] Ear Nose and Throat        Today's Diagnoses     Hyperthyroidism    -  1      Care Instructions    Nurse teaching given on total thyroidectomy and the patient expresses understanding and acceptance of instructions. Sarthak Giraldo 10/23/2017 4:47 PM          Follow-ups after your visit        Your next 10 appointments already scheduled     Nov 28, 2017  4:40 PM CST   Pre-Op physical with CHANDRAKANT De La Paz C[facility]   [facility]    [address] [place] [id number]   [phone]            Nov 29, 2017  9:30 AM CST   (Arrive by 9:15 AM)   PAC PHONE RN ASSESSMENT with Uc Pac Rn   [facility] ([facility])    [address] [place] [id number] [phone]           Note: this is not an onsite visit; there is no need to come to the facility.            Dec 01, 2017   Procedure with Keyla Griffin MD   [facility] [place] Same Day [facility] (--)    [address] [id number] [phone]            Dec 18, 2017  3:30 PM CST   (Arrive by 3:15 PM)   Return Visit with Keyla Griffin MD   [facility] Ear Nose and Throat ([facility])    [address] [place] [address] [phone]              Who to contact     Please call your clinic at [phone] to:    Ask questions about your health    Make or cancel appointments    Discuss your medicines    Learn about your test results    Speak to your doctor   If you have compliments or concerns about an experience at your clinic, or if you wish to file a complaint, please contact [facility]  "Patient Relations at [phone] or email us at [email or web address]         Additional Information About Your Visit        Alloka Information     Alloka is an electronic gateway that provides easy, online access to your medical records. With Alloka, you can request a clinic appointment, read your test results, renew a prescription or communicate with your care team.     To sign up for Alloka visit the website at [email or web address]   You will be asked to enter the access code listed below, as well as some personal information. Please follow the directions to create your username and password.     Your access code is: [email or web address]  Expires: 2017  5:30 AM     Your access code will  in 90 days. If you need help or a new code, please contact your [facility] or call [phone] for assistance.        Care EveryWhere ID     This is your Care EveryWhere ID. This could be used by other organizations to access your [facility] medical records  [id number]        Your Vitals Were     Height BMI (Body Mass Index)                1.537 m (5' 0.5\") 30.73 kg/m2           Blood Pressure from Last 3 Encounters:   10/09/17 (P) 114/75   17 96/70   17 114/70    Weight from Last 3 Encounters:   10/23/17 72.6 kg (160 lb)   10/09/17 73.5 kg (162 lb)   17 74.4 kg (164 lb)              We Performed the Following     Suzanna-Operative Worksheet (Head & Neck)        Primary Care Provider Office Phone # Fax #    Margareth Bull [phone]       No primary provider on file.        Equal Access to Services     [facility] : Sepideh Olivares, gonzales armenta, asiya scanlon. So [facility] [phone].    ATENCIÓN: Si habla español, tiene a clemente disposición servicios gratuitos de asistencia lingüística. Llame al [phone].    We comply with applicable federal civil rights laws and " [place] laws. We do not discriminate on the basis of race, color, national origin, age, disability, sex, sexual orientation, or gender identity.            Thank you!     Thank you for choosing [facility] EAR NOSE AND THROAT  for your care. Our goal is always to provide you with excellent care. Hearing back from our patients is one way we can continue to improve our services. Please take a few minutes to complete the written survey that you may receive in the mail after your visit with us. Thank you!             Your Updated Medication List - Protect others around you: Learn how to safely use, store and throw away your medicines at www.disposemymeds.org.          This list is accurate as of: 10/23/17 11:59 PM.  Always use your most recent med list.                   Brand Name Dispense Instructions for use Diagnosis    methimazole 5 MG tablet    TAPAZOLE    30 tablet    Take 1 tablet (5 mg) by mouth daily    Toxic multinodular goiter

## 2017-10-23 NOTE — NURSING NOTE
Chief Complaint   Patient presents with     Consult     consultation for thyroid goiter surgery      Ezequiel Alston

## 2017-10-23 NOTE — NURSING NOTE
Relevant Diagnosis: hyperthyroidism   Teaching Topic: total thyroidectomy   Person(s) involved in teaching: Patient,       Teaching Concerns Addressed:  Pre op teaching included the need for an H&P, NPO status pre op, hospital routines, expected recovery, activity  restrictions, antimicrobial scrub, s/s of infection, pain control methods and the importance of follow up appointments.  The patient voiced an understanding of all instructions and will call with questions.     Motivation Level:  Asks Questions:   Yes  Eager to Learn:   Yes  Cooperative:   Yes  Receptive (willing/able to accept information):   Yes     Patient  demonstrates understanding of the following:  Reason for the appointment, diagnosis and treatment plan:   Yes  Knowledge of proper use of medications and conditions for which they are ordered (with special attention to potential side effects or drug interactions):   Yes  Which situations necessitate calling provider and whom to contact:   Yes        Proper use and care of  (medical equip, care aids, etc.):   NA  Nutritional needs and diet plan:   Yes  Pain management techniques:   Yes  Patient instructed on hand hygiene:  Yes  How and/when to access community resources:   NA     Infection Prevention:  Patient   demonstrates understanding of the following:  Surgical procedure site care taught   Signs and symptoms of infection taught Yes  Wound care taught Yes     Instructional Materials Used/Given: Pre op booklet.

## 2017-10-23 NOTE — PATIENT INSTRUCTIONS
Nurse teaching given on total thyroidectomy and the patient expresses understanding and acceptance of instructions. Sarthak Giraldo 10/23/2017 4:47 PM

## 2017-11-20 NOTE — PROGRESS NOTES
REASON FOR CONSULTATION:  I was asked by Dr. Birdie Proctor to see this patient for toxic multinodular goiter with concerning nodule.      The patient presents today with her  present.      HISTORY OF PRESENT ILLNESS:  This is a 47-year-old female who had moved here from [place] several years ago.  She does have a family history of thyroid goiters in her sister and her aunt but no previous history or family history of papillary thyroid carcinoma.  She was noted to have a very large multinodular goiter, largest on the left measuring 6 cm in size.      Regarding the thyroid gland, she denies any problems with voice quality, inspiration or swallowing.  She has no symptoms of hypothyroidism but does indeed have symptoms consistent with her toxic multinodular goiter and some flushing heat intolerance and mild tachycardia.  She has again no family history of thyroid carcinoma but does have a family history of multinodular goiter in [place].      Radiographic images reveal the patient does have a multinodular goiter, largest nodule on the left measuring 6 cm in size.  Nuclear medicine scan has a slight increased uptake with a cold nodule on the right side.      PAST MEDICAL HISTORY, SURGICAL HISTORY AND MEDICATIONS:  Have been reviewed and are available in the EMR.      REVIEW OF SYSTEMS:  Ten-point review of systems is pertinent for that noted in the HPI.      PHYSICAL EXAMINATION:  Just inspecting the neck, she clearly has a large left thyroid mass that actually deviates the trachea to the right.  In palpating the thyroid gland, the superior and actually inferior borders of the thyroid gland are palpable with swallowing.  The thyroid gland appears somewhat dense with multi-nodules present within it.  There is no obvious cervical lymphadenopathy.      Most recent laboratory data included a TSH of 0.01, T3 and T4 within normal limits.      ASSESSMENT:  Toxic multinodular goiter with very large left thyroid nodule  at 6 cm.      PLAN:  Based on the above and the large left thyroid nodule as well as the concerning characteristics of a right thyroid nodule, I would recommend the patient undergo a total thyroid lobectomy.  The surgical procedure was discussed with the patient and her  including but not limited to the risks of bleeding, infection, injury to the recurrent laryngeal nerve or nerves, potential permanent hypocalcemia.  Because of the size of the nodule, the patient may need a drain and would likely spend the night because of this.  We will schedule her at the [facility].         LETICIA FRAGOSO MD             D: 2017 11:12   T: 2017 14:51   MT: PENELOPE      Name:     ABDIEL ROSS   MRN:      [id number]        Account:      [id number]   :      1970           Service Date: 10/23/2017      Document: [id number]

## 2017-11-28 ENCOUNTER — OFFICE VISIT (OUTPATIENT)
Dept: FAMILY MEDICINE | Facility: CLINIC | Age: 47
End: 2017-11-28
Payer: COMMERCIAL

## 2017-11-28 VITALS
BODY MASS INDEX: 30.58 KG/M2 | WEIGHT: 162 LBS | HEART RATE: 72 BPM | TEMPERATURE: 97.9 F | HEIGHT: 61 IN | OXYGEN SATURATION: 98 % | SYSTOLIC BLOOD PRESSURE: 113 MMHG | DIASTOLIC BLOOD PRESSURE: 70 MMHG | RESPIRATION RATE: 20 BRPM

## 2017-11-28 DIAGNOSIS — E05.90 HYPERTHYROIDISM: ICD-10-CM

## 2017-11-28 DIAGNOSIS — Z01.818 PREOP GENERAL PHYSICAL EXAM: Primary | ICD-10-CM

## 2017-11-28 DIAGNOSIS — E05.20 TOXIC MULTINODULAR GOITER: ICD-10-CM

## 2017-11-28 PROCEDURE — 99214 OFFICE O/P EST MOD 30 MIN: CPT | Performed by: NURSE PRACTITIONER

## 2017-11-28 NOTE — PROGRESS NOTES
[address] [address]  [phone]  Dept: [phone]    PRE-OP EVALUATION:  Today's date: 2017    Delio Agrawal (: 1970) presents for pre-operative evaluation assessment as requested by Dr. christina.  She requires evaluation and anesthesia risk assessment prior to undergoing surgery/procedure for treatment of thyroid .  Proposed procedure: thyroidectomy    Date of Surgery/ Procedure: 2017  Time of Surgery/ Procedure: 9:30  Hospital/Surgical Facility: [facility]   Primary Physician: No Ref-Primary, Physician  Type of Anesthesia Anticipated: General    Patient has a Health Care Directive or Living Will:  NO    Preop Questions 2017   1.  Do you have a history of heart attack, stroke, stent, bypass or surgery on an artery in the head, neck, heart or legs? No   2.  Do you ever have any pain or discomfort in your chest? No   3.  Do you have a history of  Heart Failure? No   4.   Are you troubled by shortness of breath when:  walking on a level surface, or up a slight hill, or at night? No   5.  Do you currently have a cold, bronchitis or other respiratory infection? No   6.  Do you have a cough, shortness of breath, or wheezing? No   7.  Do you sometimes get pains in the calves of your legs when you walk? No   8. Do you or anyone in your family have previous history of blood clots? No   9.  Do you or does anyone in your family have a serious bleeding problem such as prolonged bleeding following surgeries or cuts? No   10. Have you ever had problems with anemia or been told to take iron pills? No   11. Have you had any abnormal blood loss such as black, tarry or bloody stools, or abnormal vaginal bleeding? No   12. Have you ever had a blood transfusion? No   13. Have you or any of your relatives ever had problems with anesthesia? No   14. Do you have sleep apnea, excessive snoring or daytime drowsiness? No   15. Do you have any  prosthetic heart valves? No   16. Do you have prosthetic joints? No   17. Is there any chance that you may be pregnant? No           HPI:                                                      Brief HPI related to upcoming procedure: goiter for many years, hyperthyroid      See problem list for active medical problems.  Problems all longstanding and stable, except as noted/documented.  See ROS for pertinent symptoms related to these conditions.                                                                                                  .    MEDICAL HISTORY:                                                    Patient Active Problem List    Diagnosis Date Noted     Hyperthyroidism 10/23/2017     Priority: Medium     Toxic multinodular goiter 06/02/2016     Priority: Medium     Subclinical hyperthyroidism 06/02/2016     Priority: Medium     CARDIOVASCULAR SCREENING; LDL GOAL LESS THAN 160 04/03/2015     Priority: Medium     History of BCG vaccination 04/03/2015     Priority: Medium     Goiter 04/03/2015     Priority: Medium      Past Medical History:   Diagnosis Date     Goiter      Subclinical hyperthyroidism      History reviewed. No pertinent surgical history.  Current Outpatient Prescriptions   Medication Sig Dispense Refill     methimazole (TAPAZOLE) 5 MG tablet Take 1 tablet (5 mg) by mouth daily (Patient not taking: Reported on 11/28/2017) 30 tablet 1     OTC products: None, except as noted above, no recent use of OTC ASA, NSAIDS or Steroids and no use of herbal medications or other supplements    No Known Allergies   Latex Allergy: NO    Social History   Substance Use Topics     Smoking status: Never Smoker     Smokeless tobacco: Never Used     Alcohol use No     History   Drug Use No       REVIEW OF SYSTEMS:                                                    Constitutional, neuro, ENT, endocrine, pulmonary, cardiac, gastrointestinal, genitourinary, musculoskeletal, integument and psychiatric systems are  "negative, except as otherwise noted.      EXAM:                                                    /70  Pulse 72  Temp 97.9  F (36.6  C) (Tympanic)  Resp 20  Ht 5' 0.5\" (1.537 m)  Wt 162 lb (73.5 kg)  SpO2 98%  BMI 31.12 kg/m2    GENERAL APPEARANCE: healthy, alert and no distress     EYES: EOMI, PERRL     HENT: ear canals and TM's normal and nose and mouth without ulcers or lesions     NECK: no adenopathy, no asymmetry, masses, or scars and large goiter     RESP: lungs clear to auscultation - no rales, rhonchi or wheezes     CV: regular rates and rhythm, normal S1 S2, no S3 or S4 and no murmur, click or rub     ABDOMEN:  soft, nontender, no HSM or masses and bowel sounds normal     MS: extremities normal- no gross deformities noted, no evidence of inflammation in joints, FROM in all extremities.     SKIN: no suspicious lesions or rashes     NEURO: Normal strength and tone, sensory exam grossly normal, mentation intact and speech normal     PSYCH: mentation appears normal. and affect normal/bright     LYMPHATICS: No axillary, cervical, or supraclavicular nodes    DIAGNOSTICS:                                                    No labs or EKG required for low risk surgery (cataract, skin procedure, breast biopsy, etc)    Recent Labs   Lab Test  05/20/17   1855   HGB  14.2   PLT  165   NA  140   POTASSIUM  4.1   CR  0.68        IMPRESSION:                                                    Reason for surgery/procedure: hyperthyroid   Diagnosis/reason for consult: preop exam     The proposed surgical procedure is considered INTERMEDIATE risk.    REVISED CARDIAC RISK INDEX  The patient has the following serious cardiovascular risks for perioperative complications such as (MI, PE, VFib and 3  AV Block):  No serious cardiac risks  INTERPRETATION: 0 risks: Class I (very low risk - 0.4% complication rate)    The patient has the following additional risks for perioperative complications:  No identified additional " risks      ICD-10-CM    1. Preop general physical exam Z01.818    2. Toxic multinodular goiter E05.20    3. Hyperthyroidism E05.90        RECOMMENDATIONS:                                                        --Patient is to take all scheduled medications on the day of surgery EXCEPT for modifications listed below.    APPROVAL GIVEN to proceed with proposed procedure, without further diagnostic evaluation       Signed Electronically by: CHANDRAKANT De La Paz CNP    Copy of this evaluation report is provided to requesting physician.    [place] Preop Guidelines

## 2017-11-28 NOTE — NURSING NOTE
"Chief Complaint   Patient presents with     Pre-Op Exam       Initial /70  Pulse 72  Temp 97.9  F (36.6  C) (Tympanic)  Resp 20  Ht 5' 0.5\" (1.537 m)  Wt 162 lb (73.5 kg)  SpO2 98%  BMI 31.12 kg/m2 Estimated body mass index is 31.12 kg/(m^2) as calculated from the following:    Height as of this encounter: 5' 0.5\" (1.537 m).    Weight as of this encounter: 162 lb (73.5 kg).  Medication Reconciliation: complete       Chris Cruz MA       "

## 2017-11-29 ENCOUNTER — APPOINTMENT (OUTPATIENT)
Dept: SURGERY | Facility: CLINIC | Age: 47
End: 2017-11-29

## 2017-11-30 ENCOUNTER — ANESTHESIA EVENT (OUTPATIENT)
Dept: SURGERY | Facility: CLINIC | Age: 47
End: 2017-11-30
Payer: COMMERCIAL

## 2017-12-01 ENCOUNTER — HOSPITAL ENCOUNTER (OUTPATIENT)
Facility: CLINIC | Age: 47
Discharge: HOME | End: 2017-12-02
Attending: SURGERY | Admitting: SURGERY
Payer: COMMERCIAL

## 2017-12-01 ENCOUNTER — ANESTHESIA (OUTPATIENT)
Dept: SURGERY | Facility: CLINIC | Age: 47
End: 2017-12-01
Payer: COMMERCIAL

## 2017-12-01 DIAGNOSIS — E04.9 GOITER: Primary | ICD-10-CM

## 2017-12-01 DIAGNOSIS — E89.0 STATUS POST TOTAL THYROIDECTOMY: ICD-10-CM

## 2017-12-01 PROBLEM — Z98.890 STATUS POST TOTAL THYROIDECTOMY: Status: ACTIVE | Noted: 2017-12-01

## 2017-12-01 PROBLEM — Z90.89 STATUS POST TOTAL THYROIDECTOMY: Status: ACTIVE | Noted: 2017-12-01

## 2017-12-01 LAB
GLUCOSE BLDC GLUCOMTR-MCNC: 78 MG/DL (ref 70–99)
HCG UR QL: NEGATIVE

## 2017-12-01 PROCEDURE — 25000128 H RX IP 250 OP 636: Performed by: NURSE ANESTHETIST, CERTIFIED REGISTERED

## 2017-12-01 PROCEDURE — 37000009 ZZH ANESTHESIA TECHNICAL FEE, EACH ADDTL 15 MIN: Performed by: SURGERY

## 2017-12-01 PROCEDURE — 71000013 ZZH RECOVERY PHASE 1 LEVEL 1 EA ADDTL HR: Performed by: SURGERY

## 2017-12-01 PROCEDURE — 36000064 ZZH SURGERY LEVEL 4 EA 15 ADDTL MIN - UMMC: Performed by: SURGERY

## 2017-12-01 PROCEDURE — 88307 TISSUE EXAM BY PATHOLOGIST: CPT | Performed by: SURGERY

## 2017-12-01 PROCEDURE — 25000566 ZZH SEVOFLURANE, EA 15 MIN: Performed by: SURGERY

## 2017-12-01 PROCEDURE — 25000128 H RX IP 250 OP 636: Performed by: SURGERY

## 2017-12-01 PROCEDURE — 36000062 ZZH SURGERY LEVEL 4 1ST 30 MIN - UMMC: Performed by: SURGERY

## 2017-12-01 PROCEDURE — 37000008 ZZH ANESTHESIA TECHNICAL FEE, 1ST 30 MIN: Performed by: SURGERY

## 2017-12-01 PROCEDURE — 40000170 ZZH STATISTIC PRE-PROCEDURE ASSESSMENT II: Performed by: SURGERY

## 2017-12-01 PROCEDURE — 27210794 ZZH OR GENERAL SUPPLY STERILE: Performed by: SURGERY

## 2017-12-01 PROCEDURE — 81025 URINE PREGNANCY TEST: CPT | Performed by: ANESTHESIOLOGY

## 2017-12-01 PROCEDURE — 25000125 ZZHC RX 250: Performed by: NURSE ANESTHETIST, CERTIFIED REGISTERED

## 2017-12-01 PROCEDURE — 25000128 H RX IP 250 OP 636: Performed by: ANESTHESIOLOGY

## 2017-12-01 PROCEDURE — 25000132 ZZH RX MED GY IP 250 OP 250 PS 637: Performed by: SURGERY

## 2017-12-01 PROCEDURE — 25000125 ZZHC RX 250: Performed by: ANESTHESIOLOGY

## 2017-12-01 PROCEDURE — 82962 GLUCOSE BLOOD TEST: CPT

## 2017-12-01 PROCEDURE — 25000125 ZZHC RX 250: Performed by: SURGERY

## 2017-12-01 PROCEDURE — 71000012 ZZH RECOVERY PHASE 1 LEVEL 1 FIRST HR: Performed by: SURGERY

## 2017-12-01 RX ORDER — FENTANYL CITRATE 50 UG/ML
25-50 INJECTION, SOLUTION INTRAMUSCULAR; INTRAVENOUS EVERY 5 MIN PRN
Status: DISCONTINUED | OUTPATIENT
Start: 2017-12-01 | End: 2017-12-01 | Stop reason: HOSPADM

## 2017-12-01 RX ORDER — FENTANYL CITRATE 50 UG/ML
INJECTION, SOLUTION INTRAMUSCULAR; INTRAVENOUS PRN
Status: DISCONTINUED | OUTPATIENT
Start: 2017-12-01 | End: 2017-12-01

## 2017-12-01 RX ORDER — PROCHLORPERAZINE MALEATE 5 MG
10 TABLET ORAL EVERY 6 HOURS PRN
Status: DISCONTINUED | OUTPATIENT
Start: 2017-12-01 | End: 2017-12-02 | Stop reason: HOSPADM

## 2017-12-01 RX ORDER — ACETAMINOPHEN 325 MG/1
975 TABLET ORAL ONCE
Status: DISCONTINUED | OUTPATIENT
Start: 2017-12-01 | End: 2017-12-01 | Stop reason: HOSPADM

## 2017-12-01 RX ORDER — ONDANSETRON 4 MG/1
4 TABLET, ORALLY DISINTEGRATING ORAL EVERY 30 MIN PRN
Status: DISCONTINUED | OUTPATIENT
Start: 2017-12-01 | End: 2017-12-01 | Stop reason: HOSPADM

## 2017-12-01 RX ORDER — DIPHENHYDRAMINE HYDROCHLORIDE 50 MG/ML
10 INJECTION INTRAMUSCULAR; INTRAVENOUS
Status: DISCONTINUED | OUTPATIENT
Start: 2017-12-01 | End: 2017-12-01 | Stop reason: HOSPADM

## 2017-12-01 RX ORDER — NALOXONE HYDROCHLORIDE 0.4 MG/ML
.1-.4 INJECTION, SOLUTION INTRAMUSCULAR; INTRAVENOUS; SUBCUTANEOUS
Status: DISCONTINUED | OUTPATIENT
Start: 2017-12-01 | End: 2017-12-02 | Stop reason: HOSPADM

## 2017-12-01 RX ORDER — SCOLOPAMINE TRANSDERMAL SYSTEM 1 MG/1
1 PATCH, EXTENDED RELEASE TRANSDERMAL
Status: DISCONTINUED | OUTPATIENT
Start: 2017-12-01 | End: 2017-12-02 | Stop reason: HOSPADM

## 2017-12-01 RX ORDER — PROPOFOL 10 MG/ML
INJECTION, EMULSION INTRAVENOUS PRN
Status: DISCONTINUED | OUTPATIENT
Start: 2017-12-01 | End: 2017-12-01

## 2017-12-01 RX ORDER — ONDANSETRON 2 MG/ML
4 INJECTION INTRAMUSCULAR; INTRAVENOUS EVERY 30 MIN PRN
Status: DISCONTINUED | OUTPATIENT
Start: 2017-12-01 | End: 2017-12-01 | Stop reason: HOSPADM

## 2017-12-01 RX ORDER — AMOXICILLIN 250 MG
1-2 CAPSULE ORAL 2 TIMES DAILY
Qty: 30 TABLET | Refills: 0 | Status: SHIPPED | OUTPATIENT
Start: 2017-12-01 | End: 2018-10-15

## 2017-12-01 RX ORDER — OXYCODONE HYDROCHLORIDE 5 MG/1
5-10 TABLET ORAL EVERY 4 HOURS PRN
Qty: 30 TABLET | Refills: 0 | Status: SHIPPED | OUTPATIENT
Start: 2017-12-01 | End: 2017-12-04

## 2017-12-01 RX ORDER — ONDANSETRON 2 MG/ML
INJECTION INTRAMUSCULAR; INTRAVENOUS PRN
Status: DISCONTINUED | OUTPATIENT
Start: 2017-12-01 | End: 2017-12-01

## 2017-12-01 RX ORDER — SCOLOPAMINE TRANSDERMAL SYSTEM 1 MG/1
1 PATCH, EXTENDED RELEASE TRANSDERMAL
Status: DISCONTINUED | OUTPATIENT
Start: 2017-12-01 | End: 2017-12-01 | Stop reason: HOSPADM

## 2017-12-01 RX ORDER — OXYCODONE HYDROCHLORIDE 5 MG/1
5-10 TABLET ORAL EVERY 4 HOURS PRN
Status: DISCONTINUED | OUTPATIENT
Start: 2017-12-01 | End: 2017-12-01

## 2017-12-01 RX ORDER — ACETAMINOPHEN 325 MG/1
650 TABLET ORAL EVERY 6 HOURS PRN
Status: DISCONTINUED | OUTPATIENT
Start: 2017-12-01 | End: 2017-12-02 | Stop reason: HOSPADM

## 2017-12-01 RX ORDER — MEPERIDINE HYDROCHLORIDE 25 MG/ML
12.5 INJECTION INTRAMUSCULAR; INTRAVENOUS; SUBCUTANEOUS EVERY 5 MIN PRN
Status: DISCONTINUED | OUTPATIENT
Start: 2017-12-01 | End: 2017-12-01 | Stop reason: HOSPADM

## 2017-12-01 RX ORDER — NALOXONE HYDROCHLORIDE 0.4 MG/ML
.1-.4 INJECTION, SOLUTION INTRAMUSCULAR; INTRAVENOUS; SUBCUTANEOUS
Status: DISCONTINUED | OUTPATIENT
Start: 2017-12-01 | End: 2017-12-01 | Stop reason: HOSPADM

## 2017-12-01 RX ORDER — SODIUM CHLORIDE, SODIUM LACTATE, POTASSIUM CHLORIDE, CALCIUM CHLORIDE 600; 310; 30; 20 MG/100ML; MG/100ML; MG/100ML; MG/100ML
INJECTION, SOLUTION INTRAVENOUS CONTINUOUS
Status: DISCONTINUED | OUTPATIENT
Start: 2017-12-01 | End: 2017-12-01 | Stop reason: HOSPADM

## 2017-12-01 RX ORDER — HYDROMORPHONE HYDROCHLORIDE 1 MG/ML
.3-.5 INJECTION, SOLUTION INTRAMUSCULAR; INTRAVENOUS; SUBCUTANEOUS
Status: DISCONTINUED | OUTPATIENT
Start: 2017-12-01 | End: 2017-12-02

## 2017-12-01 RX ORDER — HYDRALAZINE HYDROCHLORIDE 20 MG/ML
2.5-5 INJECTION INTRAMUSCULAR; INTRAVENOUS EVERY 10 MIN PRN
Status: DISCONTINUED | OUTPATIENT
Start: 2017-12-01 | End: 2017-12-01 | Stop reason: HOSPADM

## 2017-12-01 RX ORDER — CALCITRIOL 0.25 UG/1
0.25 CAPSULE, LIQUID FILLED ORAL 3 TIMES DAILY
Status: DISCONTINUED | OUTPATIENT
Start: 2017-12-01 | End: 2017-12-02 | Stop reason: HOSPADM

## 2017-12-01 RX ORDER — LIDOCAINE HYDROCHLORIDE 20 MG/ML
INJECTION, SOLUTION INFILTRATION; PERINEURAL PRN
Status: DISCONTINUED | OUTPATIENT
Start: 2017-12-01 | End: 2017-12-01

## 2017-12-01 RX ORDER — DEXAMETHASONE SODIUM PHOSPHATE 4 MG/ML
INJECTION, SOLUTION INTRA-ARTICULAR; INTRALESIONAL; INTRAMUSCULAR; INTRAVENOUS; SOFT TISSUE PRN
Status: DISCONTINUED | OUTPATIENT
Start: 2017-12-01 | End: 2017-12-01

## 2017-12-01 RX ORDER — OXYCODONE HCL 5 MG/5 ML
5-10 SOLUTION, ORAL ORAL EVERY 4 HOURS PRN
Status: DISCONTINUED | OUTPATIENT
Start: 2017-12-01 | End: 2017-12-02 | Stop reason: HOSPADM

## 2017-12-01 RX ORDER — LABETALOL HYDROCHLORIDE 5 MG/ML
5 INJECTION, SOLUTION INTRAVENOUS
Status: DISCONTINUED | OUTPATIENT
Start: 2017-12-01 | End: 2017-12-01 | Stop reason: HOSPADM

## 2017-12-01 RX ORDER — LEVOTHYROXINE SODIUM 125 UG/1
125 TABLET ORAL
Status: DISCONTINUED | OUTPATIENT
Start: 2017-12-02 | End: 2017-12-02 | Stop reason: HOSPADM

## 2017-12-01 RX ORDER — ONDANSETRON 2 MG/ML
4 INJECTION INTRAMUSCULAR; INTRAVENOUS EVERY 6 HOURS PRN
Status: DISCONTINUED | OUTPATIENT
Start: 2017-12-01 | End: 2017-12-02 | Stop reason: HOSPADM

## 2017-12-01 RX ORDER — ONDANSETRON 4 MG/1
4 TABLET, ORALLY DISINTEGRATING ORAL EVERY 6 HOURS PRN
Status: DISCONTINUED | OUTPATIENT
Start: 2017-12-01 | End: 2017-12-02 | Stop reason: HOSPADM

## 2017-12-01 RX ORDER — CALCIUM CARBONATE 500 MG/1
1000 TABLET, CHEWABLE ORAL 4 TIMES DAILY
Status: DISCONTINUED | OUTPATIENT
Start: 2017-12-01 | End: 2017-12-02 | Stop reason: HOSPADM

## 2017-12-01 RX ORDER — HYDROMORPHONE HYDROCHLORIDE 1 MG/ML
.3-.5 INJECTION, SOLUTION INTRAMUSCULAR; INTRAVENOUS; SUBCUTANEOUS
Status: DISCONTINUED | OUTPATIENT
Start: 2017-12-01 | End: 2017-12-01 | Stop reason: HOSPADM

## 2017-12-01 RX ADMIN — HYDROMORPHONE HYDROCHLORIDE 0.3 MG: 1 INJECTION, SOLUTION INTRAMUSCULAR; INTRAVENOUS; SUBCUTANEOUS at 18:53

## 2017-12-01 RX ADMIN — DIPHENHYDRAMINE HYDROCHLORIDE 10 MG: 50 INJECTION, SOLUTION INTRAMUSCULAR; INTRAVENOUS at 15:14

## 2017-12-01 RX ADMIN — DEXAMETHASONE SODIUM PHOSPHATE 4 MG: 4 INJECTION, SOLUTION INTRA-ARTICULAR; INTRALESIONAL; INTRAMUSCULAR; INTRAVENOUS; SOFT TISSUE at 10:40

## 2017-12-01 RX ADMIN — REMIFENTANIL HYDROCHLORIDE 0.15 MCG/KG/MIN: 1 INJECTION, POWDER, LYOPHILIZED, FOR SOLUTION INTRAVENOUS at 10:30

## 2017-12-01 RX ADMIN — SODIUM CHLORIDE, POTASSIUM CHLORIDE, SODIUM LACTATE AND CALCIUM CHLORIDE: 600; 310; 30; 20 INJECTION, SOLUTION INTRAVENOUS at 10:05

## 2017-12-01 RX ADMIN — Medication 0.2 MG: at 16:43

## 2017-12-01 RX ADMIN — HYDROMORPHONE HYDROCHLORIDE 0.5 MG: 1 INJECTION, SOLUTION INTRAMUSCULAR; INTRAVENOUS; SUBCUTANEOUS at 13:36

## 2017-12-01 RX ADMIN — PROPOFOL 40 MG: 10 INJECTION, EMULSION INTRAVENOUS at 11:00

## 2017-12-01 RX ADMIN — OXYCODONE HYDROCHLORIDE 5 MG: 5 SOLUTION ORAL at 20:47

## 2017-12-01 RX ADMIN — PROPOFOL 50 MG: 10 INJECTION, EMULSION INTRAVENOUS at 10:33

## 2017-12-01 RX ADMIN — LIDOCAINE HYDROCHLORIDE 100 MG: 20 INJECTION, SOLUTION INFILTRATION; PERINEURAL at 10:31

## 2017-12-01 RX ADMIN — Medication 60 MG: at 10:35

## 2017-12-01 RX ADMIN — MIDAZOLAM HYDROCHLORIDE 2 MG: 1 INJECTION, SOLUTION INTRAMUSCULAR; INTRAVENOUS at 10:15

## 2017-12-01 RX ADMIN — ONDANSETRON 4 MG: 2 INJECTION INTRAMUSCULAR; INTRAVENOUS at 14:24

## 2017-12-01 RX ADMIN — PROPOFOL 30 MG: 10 INJECTION, EMULSION INTRAVENOUS at 10:35

## 2017-12-01 RX ADMIN — FENTANYL CITRATE 100 MCG: 50 INJECTION, SOLUTION INTRAMUSCULAR; INTRAVENOUS at 10:29

## 2017-12-01 RX ADMIN — SCOPALAMINE 1 PATCH: 1 PATCH, EXTENDED RELEASE TRANSDERMAL at 14:55

## 2017-12-01 RX ADMIN — Medication 0.2 MG: at 15:14

## 2017-12-01 RX ADMIN — Medication 0.3 MG: at 14:33

## 2017-12-01 RX ADMIN — PROCHLORPERAZINE EDISYLATE 10 MG: 5 INJECTION INTRAMUSCULAR; INTRAVENOUS at 14:24

## 2017-12-01 RX ADMIN — Medication 0.2 MG: at 14:41

## 2017-12-01 RX ADMIN — ONDANSETRON 4 MG: 2 INJECTION INTRAMUSCULAR; INTRAVENOUS at 13:15

## 2017-12-01 ASSESSMENT — PAIN DESCRIPTION - DESCRIPTORS
DESCRIPTORS: SORE
DESCRIPTORS: ACHING

## 2017-12-01 ASSESSMENT — ENCOUNTER SYMPTOMS: STRIDOR: 0

## 2017-12-01 NOTE — BRIEF OP NOTE
[facility], [place]    Brief Operative Note    Pre-operative diagnosis: Hyperthyroidism, multinodular goiter  Post-operative diagnosis: Same, substernal goiter  Procedure: Total Thyroidectomy   Surgeon: Surgeon(s) and Role:     * Keyla Griffin MD - Primary     * Sarahi Stewart MD PGY-6 - Assisting  Anesthesia: General   Estimated blood loss: 40 ml  Drains: Cal-Asencio  Specimens:   ID Type Source Tests Collected by Time Destination   A : left lobe of thyroid Tissue Thyroid, left SURGICAL PATHOLOGY EXAM Keyla Griffin MD 12/1/2017 12:54 PM    B : Right lobe of thyroid Tissue Thyroid, Right SURGICAL PATHOLOGY EXAM Keyla Griffin MD 12/1/2017  1:18 PM      Findings:   Large, substernal goiter, both recurrent laryngeal nerves intact .  Complications: None.  Implants: None.

## 2017-12-01 NOTE — IP AVS SNAPSHOT
MRN:[id number]                      After Visit Summary   12/1/2017    Delio Agrawal    MRN: [id number]           Thank you!     Thank you for choosing [facility] for your care. Our goal is always to provide you with excellent care. Hearing back from our patients is one way we can continue to improve our services. Please take a few minutes to complete the written survey that you may receive in the mail after you visit with us. Thank you!        Patient Information     Date Of Birth          1970        About your hospital stay     You were admitted on:  December 1, 2017 You last received care in the:  Unit [facility] Observation [facility]    You were discharged on:  December 2, 2017        Reason for your hospital stay       Surgery to remove your thyroid for a goiter.                  Who to Call     For medical emergencies, please call 911.  For non-urgent questions about your medical care, please call your primary care provider or clinic, None  For questions related to your surgery, please call your surgery clinic        Attending Provider     Provider Keyla Garay MD General Surgery       Primary Care Provider Fax #    Physician No Ref-Primary [phone]      After Care Instructions     Activity       Your activity upon discharge: activity as tolerated            Diet       Follow this diet upon discharge: Regular            Discharge Instructions       Patient to follow up with appointment on Monday 12/4 for drain removal as well as in 2 weeks with Dr. Griffin.    May start regular diet immediately.    No activity restrictions.    May shower now. No bathing for two weeks.    Call [phone] to re-schedule appointments or with routine questions during the work week.      Call [phone] and ask to speak with surgery resident if you are having troubles in the evenings, at night, or on weekends. Please call if you experience increasing abdominal pain, nausea,  vomiting, increasing drainage from your wounds, chills, or fever >101.5    If you have difficulty with breathing or rapid neck swelling, go to the Emergency Department.     Take stool softener while taking narcotic pain medication to prevent constipation.    No driving for at least 12 hours after taking narcotic pain medication.    You are going home with the following tubes or drains: PAMELA drain.  Strip tubing, empty, and record output daily.     Please call Dr. Griffin with questions or concerns at [phone].            Monitor and record       Drain outputs daily.            Tubes and drains       You are going home with the following tubes or drains: PAMELA.  Tube cares per hospital or home care instructions                  Follow-up Appointments     Adult [facility] Follow-up and recommended labs and tests       Follow up with Dr. Griffin , at [facility], within 2 weeks to evaluate after surgery. No follow up labs or test are needed.    Appointments on [place] and/or [facility] (with [facility] or [facility] provider or service). Call [phone] if you haven't heard regarding these appointments within 7 days of discharge.                  Your next 10 appointments already scheduled     Dec 18, 2017  3:30 PM CST   (Arrive by 3:15 PM)   Return Visit with Keyla Griffin MD   [facility] ([facility])    [address] [place] [address] [phone]              Pending Results     Date and Time Order Name Status Description    12/1/2017 1254 Surgical pathology exam In process             Statement of Approval     Ordered          12/02/17 0817  I have reviewed and agree with all the recommendations and orders detailed in this document.  EFFECTIVE NOW     Approved and electronically signed by:  Jono Clancy MD             Admission Information     Date & Time Provider Department Dept. Phone    12/1/2017 Elias  "Keyla Weston MD [address] [facility] [phone]      Your Vitals Were     Blood Pressure Pulse Temperature Respirations Height Weight    116/73 (BP Location: Right arm) 87 98.8  F (37.1  C) (Oral) 18 1.537 m (5' 0.51\") 73.7 kg (162 lb 7.7 oz)    Pulse Oximetry BMI (Body Mass Index)                98% 31.2 kg/m2          [email or web address] Information     [facility] lets you send messages to your doctor, view your test results, renew your prescriptions, schedule appointments and more. To sign up, go to [email or web address] . Click on \"Log in\" on the left side of the screen, which will take you to the Welcome page. Then click on \"Sign up Now\" on the right side of the page.     You will be asked to enter the access code listed below, as well as some personal information. Please follow the directions to create your username and password.     Your access code is: [email or web address]  Expires: 2017  5:30 AM     Your access code will  in 90 days. If you need help or a new code, please call your [facility] clinic or [phone].        Care EveryWhere ID     This is your Care EveryWhere ID. This could be used by other organizations to access your [facility] medical records  [id number]        Equal Access to Services     ANASTASIIA RYAN AH: Hadelissa Olivares, waaxda lutroy, qaybta kaalasiya rodarte. So [facility] [phone].    ATENCIÓN: Si habla español, tiene a clemente disposición servicios gratuitos de asistencia lingüística. Jacek al [phone].    We comply with applicable federal civil rights laws and [place] laws. We do not discriminate on the basis of race, color, national origin, age, disability, sex, sexual orientation, or gender identity.               Review of your medicines      START taking        Dose / Directions    calcitRIOL 0.25 MCG capsule   Commonly known as:  ROCALTROL   Used for:  Goiter        Dose:  0.25 mcg   Take 1 capsule (0.25 mcg) by mouth " 3 times daily   Quantity:  60 capsule   Refills:  1       calcium carbonate 500 MG chewable tablet   Commonly known as:  TUMS   Used for:  Goiter        Dose:  2 chew tab   Take 2 tablets (1,000 mg) by mouth 3 times daily   Quantity:  150 tablet   Refills:  1       levothyroxine 125 MCG tablet   Commonly known as:  SYNTHROID/LEVOTHROID        Dose:  125 mcg   Take 1 tablet (125 mcg) by mouth every morning (before breakfast)   Quantity:  30 tablet   Refills:  3       oxyCODONE IR 5 MG tablet   Commonly known as:  ROXICODONE   Used for:  Goiter        Dose:  5-10 mg   Take 1-2 tablets (5-10 mg) by mouth every 4 hours as needed for pain or other (Moderate to Severe)   Quantity:  30 tablet   Refills:  0       senna-docusate 8.6-50 MG per tablet   Commonly known as:  SENOKOT-S;PERICOLACE   Used for:  Goiter        Dose:  1-2 tablet   Take 1-2 tablets by mouth 2 times daily Take while on oral narcotics to prevent or treat constipation.   Quantity:  30 tablet   Refills:  0         STOP taking     methimazole 5 MG tablet   Commonly known as:  TAPAZOLE                Where to get your medicines      These medications were sent to [facility] [place], [place] - [address] [address]     Phone:  [phone]     calcitRIOL 0.25 MCG capsule    calcium carbonate 500 MG chewable tablet    levothyroxine 125 MCG tablet         Some of these will need a paper prescription and others can be bought over the counter. Ask your nurse if you have questions.     Bring a paper prescription for each of these medications     oxyCODONE IR 5 MG tablet    senna-docusate 8.6-50 MG per tablet                Protect others around you: Learn how to safely use, store and throw away your medicines at www.disposemymeds.org.             Medication List: This is a list of all your medications and when to take them. Check marks below indicate your daily home schedule. Keep this list as a  reference.      Medications           Morning Afternoon Evening Bedtime As Needed    calcitRIOL 0.25 MCG capsule   Commonly known as:  ROCALTROL   Take 1 capsule (0.25 mcg) by mouth 3 times daily   Last time this was given:  0.25 mcg on 12/2/2017  8:27 AM                                calcium carbonate 500 MG chewable tablet   Commonly known as:  TUMS   Take 2 tablets (1,000 mg) by mouth 3 times daily   Last time this was given:  1,000 mg on 12/2/2017  8:28 AM                                levothyroxine 125 MCG tablet   Commonly known as:  SYNTHROID/LEVOTHROID   Take 1 tablet (125 mcg) by mouth every morning (before breakfast)   Last time this was given:  125 mcg on 12/2/2017  8:27 AM                                oxyCODONE IR 5 MG tablet   Commonly known as:  ROXICODONE   Take 1-2 tablets (5-10 mg) by mouth every 4 hours as needed for pain or other (Moderate to Severe)                                senna-docusate 8.6-50 MG per tablet   Commonly known as:  SENOKOT-S;PERICOLACE   Take 1-2 tablets by mouth 2 times daily Take while on oral narcotics to prevent or treat constipation.

## 2017-12-01 NOTE — ANESTHESIA CARE TRANSFER NOTE
Patient: Delio Agrawal    Procedure(s):  Total Thyroidectomy of substernal goiter - Wound Class: I-Clean    Diagnosis: Hyperthyroidism  Diagnosis Additional Information: No value filed.    Anesthesia Type:   General, ETT     Note:  Airway :Nasal Cannula  Patient transferred to:PACU  Comments: Spont resp, VSS, Report to RNHandoff Report: Identifed the Patient, Identified the Reponsible Provider, Reviewed the pertinent medical history, Discussed the surgical course, Reviewed Intra-OP anesthesia mangement and issues during anesthesia, Set expectations for post-procedure period and Allowed opportunity for questions and acknowledgement of understanding      Vitals: (Last set prior to Anesthesia Care Transfer)    CRNA VITALS  12/1/2017 1339 - 12/1/2017 1414      12/1/2017             Pulse: 120    SpO2: 99 %                Electronically Signed By: CHANDRAKANT Santa CRNA  December 1, 2017  2:14 PM

## 2017-12-01 NOTE — ANESTHESIA POSTPROCEDURE EVALUATION
Patient: Delio Agrawal    Procedure(s):  Total Thyroidectomy of substernal goiter - Wound Class: I-Clean    Diagnosis:Hyperthyroidism  Diagnosis Additional Information: No value filed.    Anesthesia Type:  General, ETT    Note:  Anesthesia Post Evaluation    Patient location during evaluation: PACU  Patient participation: Able to fully participate in evaluation  Level of consciousness: awake and alert  Pain management: satisfactory to patient  Airway patency: patent  Cardiovascular status: acceptable and stable  Respiratory status: acceptable and spontaneous ventilation  Hydration status: euvolemic  PONV: controlled     Anesthetic complications: None          Last vitals:  Vitals:    12/01/17 0807   BP: 135/86   Pulse: 87   Resp: 20   Temp: 36.8  C (98.3  F)   SpO2: 100%         Electronically Signed By: Herman Dan MD  December 1, 2017  2:55 PM

## 2017-12-01 NOTE — ANESTHESIA PREPROCEDURE EVALUATION
Anesthesia Evaluation     . Pt has not had prior anesthetic            ROS/MED HX    ENT/Pulmonary:     (+)other ENT- tracheal deviation d/t goiter, , . .    Neurologic:  - neg neurologic ROS     Cardiovascular:  - neg cardiovascular ROS       METS/Exercise Tolerance:  >4 METS   Hematologic:  - neg hematologic  ROS       Musculoskeletal:  - neg musculoskeletal ROS       GI/Hepatic:  - neg GI/hepatic ROS       Renal/Genitourinary:  - ROS Renal section negative       Endo:     (+) thyroid problem .      Psychiatric:  - neg psychiatric ROS       Infectious Disease:  - neg infectious disease ROS       Malignancy:         Other:                     Physical Exam  Normal systems: pulmonary    Airway   Mallampati: II  TM distance: >3 FB  Neck ROM: full  Comment: Trachea is deviated 3-4 cm to the right of midline. No turbulence appreciated during auscultation in the upright or in the supine position.    Dental     Cardiovascular   Rhythm and rate: regular and normal  (-) no murmur    Pulmonary    breath sounds clear to auscultation(-) no rhonchi, no wheezes, no rales and no stridor                    Anesthesia Plan      History & Physical Review      ASA Status:  2 .    NPO Status:  > 8 hours    Plan for General and ETT with Intravenous induction. Maintenance will be Balanced.    PONV prophylaxis:  Ondansetron (or other 5HT-3), Dexamethasone or Solumedrol, Other (See comment) and Scopolamine patch (propofol)  Additional equipment: Videolaryngoscope and 2nd IV      Postoperative Care  Postoperative pain management:  IV analgesics, Oral pain medications and Multi-modal analgesia.      Consents  Anesthetic plan, risks, benefits and alternatives discussed with:  Patient..        47yF. Hx hyperthyroidism with large goiter presents for thyroidectomy. Their is a significant amount of tracheal deviation but she denies dyspnea, dysphagia, or dysphonia in the upright or supine position. Plan for asleep intubation. We did discuss  the possibility of nerve injury or edema from the surgery which may necessitate prolonged mechanical ventilation. All risks, benefits, and alternatives to the proposed anesthetic plan were discussed. She reported understanding of these options and all of her questions were answered to her satisfaction. She provided informed consent for the procedure as planned.   ___________________   Luke Azevedo MD          Pager: [phone]                      .

## 2017-12-01 NOTE — IP AVS SNAPSHOT
Unit [facility]D Observation [address] [address]    Phone:  [phone]    Fax:  [phone]                                       After Visit Summary   12/1/2017    Delio Agrawal    MRN: [id number]           After Visit Summary Signature Page     I have received my discharge instructions, and my questions have been answered. I have discussed any challenges I see with this plan with the nurse or doctor.    ..........................................................................................................................................  Patient/Patient Representative Signature      ..........................................................................................................................................  Patient Representative Print Name and Relationship to Patient    ..................................................               ................................................  Date                                            Time    ..........................................................................................................................................  Reviewed by Signature/Title    ...................................................              ..............................................  Date                                                            Time

## 2017-12-02 VITALS
BODY MASS INDEX: 30.68 KG/M2 | HEIGHT: 61 IN | WEIGHT: 162.48 LBS | TEMPERATURE: 98.4 F | HEART RATE: 87 BPM | SYSTOLIC BLOOD PRESSURE: 112 MMHG | OXYGEN SATURATION: 97 % | RESPIRATION RATE: 26 BRPM | DIASTOLIC BLOOD PRESSURE: 72 MMHG

## 2017-12-02 LAB — PTH-INTACT SERPL-MCNC: <3 PG/ML (ref 12–72)

## 2017-12-02 PROCEDURE — 25000132 ZZH RX MED GY IP 250 OP 250 PS 637: Performed by: SURGERY

## 2017-12-02 PROCEDURE — 36415 COLL VENOUS BLD VENIPUNCTURE: CPT | Performed by: SURGERY

## 2017-12-02 PROCEDURE — S0169 CALCITROL: HCPCS | Performed by: SURGERY

## 2017-12-02 PROCEDURE — 83970 ASSAY OF PARATHORMONE: CPT | Performed by: SURGERY

## 2017-12-02 RX ORDER — CALCIUM CARBONATE 500 MG/1
2 TABLET, CHEWABLE ORAL 3 TIMES DAILY
Qty: 150 TABLET | Refills: 1 | Status: SHIPPED | OUTPATIENT
Start: 2017-12-02 | End: 2018-01-29

## 2017-12-02 RX ORDER — CALCITRIOL 0.25 UG/1
0.25 CAPSULE, LIQUID FILLED ORAL 3 TIMES DAILY
Qty: 60 CAPSULE | Refills: 1 | Status: SHIPPED | OUTPATIENT
Start: 2017-12-02 | End: 2018-01-29

## 2017-12-02 RX ORDER — LEVOTHYROXINE SODIUM 125 UG/1
125 TABLET ORAL
Qty: 30 TABLET | Refills: 3 | Status: SHIPPED | OUTPATIENT
Start: 2017-12-02 | End: 2018-01-29

## 2017-12-02 RX ADMIN — OXYCODONE HYDROCHLORIDE 5 MG: 5 SOLUTION ORAL at 12:54

## 2017-12-02 RX ADMIN — LEVOTHYROXINE SODIUM 125 MCG: 125 TABLET ORAL at 08:27

## 2017-12-02 RX ADMIN — CALCIUM CARBONATE (ANTACID) CHEW TAB 500 MG 1000 MG: 500 CHEW TAB at 12:54

## 2017-12-02 RX ADMIN — CALCIUM CARBONATE (ANTACID) CHEW TAB 500 MG 1000 MG: 500 CHEW TAB at 08:28

## 2017-12-02 RX ADMIN — CALCITRIOL 0.25 MCG: 0.25 CAPSULE, LIQUID FILLED ORAL at 08:27

## 2017-12-02 RX ADMIN — OXYCODONE HYDROCHLORIDE 5 MG: 5 SOLUTION ORAL at 04:45

## 2017-12-02 NOTE — PROGRESS NOTES
Discharge instruction reviewed.  Patient verbalized understanding. PIV removed, w/c provided to the main lobby with Family. Patient discharged with all belongings.

## 2017-12-02 NOTE — DISCHARGE SUMMARY
"[facility]  Discharge Summary  Surgical Oncology     Delio Agrawal MRN# [id number]   YOB: 1970 Age: 47 year old     Date of Admission:  12/1/2017  Date of Discharge::  12/2/2017  Admitting Physician:  Keyla Griffin MD  Discharge Physician:  Same  Primary Care Physician:             No Ref-Primary, Physician          Admission Diagnoses:   Status post total thyroidectomy    Co-morbidities:  Hyperthyroidism  Toxic multinodular goiter          Discharge Diagnosis:     Patient Active Problem List   Diagnosis     CARDIOVASCULAR SCREENING; LDL GOAL LESS THAN 160     History of BCG vaccination     Goiter     Toxic multinodular goiter     Subclinical hyperthyroidism     Hyperthyroidism     Status post total thyroidectomy     Postsurgical hypothyroidism            Procedures:   Procedure(s):  Total Thyroidectomy of substernal goiter - Wound Class: I-Clean  Surgeon: Elias  Date: 12/1/17          Brief History of Illness:   Taken from Clinic Consult:  \"This is a 47-year-old female who had moved here from [place] several years ago.  She does have a family history of thyroid goiters in her sister and her aunt but no previous history or family history of papillary thyroid carcinoma.  She was noted to have a very large multinodular goiter, largest on the left measuring 6 cm in size.       Regarding the thyroid gland, she denies any problems with voice quality, inspiration or swallowing.  She has no symptoms of hypothyroidism but does indeed have symptoms consistent with her toxic multinodular goiter and some flushing heat intolerance and mild tachycardia.  She has again no family history of thyroid carcinoma but does have a family history of multinodular goiter in [place].       Radiographic images reveal the patient does have a multinodular goiter, largest nodule on the left measuring 6 cm in size.  Nuclear medicine scan has a slight increased uptake with a cold nodule on the right " "side. \"           Hospital Course:   The patient was admitted to the general surgical floor postoperatively. She had no issues with breathing or swallowing. Her pain was controlled with oral medications. She will discharge with her surgical drain and follow up in clinic in Monday with Dr. Griffin on Monday, 12/4/17 for drain removal. Her other orders and instructions are as below.           Day of Discharge Physical Exam:   Blood pressure 116/73, pulse 87, temperature 98.8  F (37.1  C), temperature source Oral, resp. rate 18, height 1.537 m (5' 0.51\"), weight 73.7 kg (162 lb 7.7 oz), SpO2 98 %, not currently breastfeeding.    Gen: AAOx3, NAD  Pulm: Non-labored breathing  HEENT:  Incision is c/d/i with glue. Drain in place with thin s/s drainage.   Abd: Soft, nt.   Ext:  Warm and well-perfused         Final Pathology Result:   Pending at time of discharge           Discharge Instructions and Follow-Up:     Discharge Procedure Orders  Discharge Instructions   Order Comments: Patient to follow up with appointment on Monday 12/4 for drain removal as well as in 2 weeks with Dr. Griffin.    May start regular diet immediately.    No activity restrictions.    May shower now. No bathing for two weeks.    Call [phone] to re-schedule appointments or with routine questions during the work week.      Call [phone] and ask to speak with surgery resident if you are having troubles in the evenings, at night, or on weekends. Please call if you experience increasing abdominal pain, nausea, vomiting, increasing drainage from your wounds, chills, or fever >101.5    If you have difficulty with breathing or rapid neck swelling, go to the Emergency Department.     Take stool softener while taking narcotic pain medication to prevent constipation.    No driving for at least 12 hours after taking narcotic pain medication.    You are going home with the following tubes or drains: PAMELA drain.  Strip tubing, empty, and record output " daily.     Please call Dr. Griffin with questions or concerns at [phone].     Reason for your hospital stay   Order Comments: Surgery to remove your thyroid for a goiter.     [facility] Follow-up and recommended labs and tests   Order Comments: Follow up with Dr. Griffin , at [facility], within 2 weeks to evaluate after surgery. No follow up labs or test are needed.    Appointments on [place] and/or [facility] (with [facility] or [facility] provider or service). Call [phone] if you haven't heard regarding these appointments within 7 days of discharge.     Activity   Order Comments: Your activity upon discharge: activity as tolerated   Order Specific Question Answer Comments   Is discharge order? Yes      Monitor and record   Order Comments: Drain outputs daily.     Tubes and drains   Order Comments: You are going home with the following tubes or drains: PAMELA.  Tube cares per hospital or home care instructions     Full Code     Diet   Order Comments: Follow this diet upon discharge: Regular   Order Specific Question Answer Comments   Is discharge order? Yes               Home Health Care:   Not needed           Discharge Disposition:   Discharged to home      Condition at discharge: Stable         Consultations:   None         Imaging Studies:     Results for orders placed or performed in visit on 05/20/17   XR Cervical Spine 2/3 Views    Narrative    CERVICAL SPINE TWO - THREE VIEWS  5/20/2017 7:27 PM     COMPARISON: Nuclear medicine thyroid scan 4/19/2016.    HISTORY: Cervicalgia      Impression    IMPRESSION: There is normal alignment of the cervical vertebrae;  however, there is straightening of normal cervical lordosis. Vertebral  body heights of the cervical spine are normal. Craniocervical  alignment is normal. There are no fractures of the cervical spine.  There is no prevertebral soft tissue swelling.    A soft tissue mass in the low neck compatible with the enlarged  thyroid  gland noted on the comparison nuclear medicine study is again  noted resulting in rightward tracheal deviation.    RONNY RUIZ MD              Medications Prior to Admission:     Prescriptions Prior to Admission   Medication Sig Dispense Refill Last Dose     [DISCONTINUED] methimazole (TAPAZOLE) 5 MG tablet Take 1 tablet (5 mg) by mouth daily 30 tablet 1 Past Week at Unknown time              Discharge Medications:     Current Discharge Medication List      START taking these medications    Details   calcium carbonate (TUMS) 500 MG chewable tablet Take 2 tablets (1,000 mg) by mouth 3 times daily  Qty: 150 tablet, Refills: 1    Associated Diagnoses: Goiter      calcitRIOL (ROCALTROL) 0.25 MCG capsule Take 1 capsule (0.25 mcg) by mouth 3 times daily  Qty: 60 capsule, Refills: 1    Associated Diagnoses: Goiter      levothyroxine (SYNTHROID/LEVOTHROID) 125 MCG tablet Take 1 tablet (125 mcg) by mouth every morning (before breakfast)  Qty: 30 tablet, Refills: 3    Associated Diagnoses: Status post total thyroidectomy      senna-docusate (SENOKOT-S;PERICOLACE) 8.6-50 MG per tablet Take 1-2 tablets by mouth 2 times daily Take while on oral narcotics to prevent or treat constipation.  Qty: 30 tablet, Refills: 0    Comments: While on narcotics  Associated Diagnoses: Goiter      oxyCODONE IR (ROXICODONE) 5 MG tablet Take 1-2 tablets (5-10 mg) by mouth every 4 hours as needed for pain or other (Moderate to Severe)  Qty: 30 tablet, Refills: 0    Comments: Limit 6 tablets daily  Associated Diagnoses: Goiter         STOP taking these medications       methimazole (TAPAZOLE) 5 MG tablet Comments:   Reason for Stopping:                Jono Clancy MD  General Surgery PGY-3  Pager [phone]

## 2017-12-02 NOTE — PROGRESS NOTES
"Patient A&O, VSS. Incision horizontal to mid neck approximated with dermabond MICHAEL with no drainage noted. PAMELA drain bulb suction from neck with dark red/brown drainage. Pain to neck, pt states \"sore\", prn IV Dilaudid 0.3 mg give with some relief. Patient denies nausea. Will continued to monitor overnight.  "

## 2017-12-02 NOTE — PROGRESS NOTES
Patient A&O x4, VSS. Capno ETCO2 40, IP 8. Incision horizontal to mid neck approximated with dermabond MICHAEL with no drainage noted. PAMELA drain bulb suction from neck with dark red/brown drainage, 15 ml x 2 out this shift, PAMELA teaching and handout given. Patient denies pain and nausea. Tolerating soup and fluids. Voiding spontaneously, ambulates independently to bathroom. Plan for discharge once  arrives.

## 2017-12-02 NOTE — PROGRESS NOTES
"S/pTotal Thyroidectomy. Pt A&O, AVSS. Incision to neck dermabonded, CDI, no drainage noted, MICHAEL. PAMELA drain bulb to suction, patent with moderate output/dark red. C/o of  pain to incision site, relieved with Oxycodone. Pain well managed per pt report. Soreness with swallowing, but no difficulty noted otherwise. Denies n/v. SBA with ambulation, voiding independently. Will continued to monitor overnight.  /66  Pulse 87  Temp 99  F (37.2  C) (Oral)  Resp 18  Ht 1.537 m (5' 0.51\")  Wt 73.7 kg (162 lb 7.7 oz)  SpO2 97%  BMI 31.2 kg/m2    "

## 2017-12-04 ENCOUNTER — OFFICE VISIT (OUTPATIENT)
Dept: OTOLARYNGOLOGY | Facility: CLINIC | Age: 47
End: 2017-12-04

## 2017-12-04 DIAGNOSIS — E89.0 S/P TOTAL THYROIDECTOMY: Primary | ICD-10-CM

## 2017-12-04 ASSESSMENT — PAIN SCALES - GENERAL: PAINLEVEL: MILD PAIN (2)

## 2017-12-04 NOTE — LETTER
Date:December 19, 2017      Patient was self referred, no letter generated. Do not send.        [facility] Health Information

## 2017-12-04 NOTE — PATIENT INSTRUCTIONS
Your PAMELA drain was discontinued today with out any complications. Follow up as scheduled for surgical follow up.   Sarthak Giraldo ,RN  [phone]

## 2017-12-04 NOTE — MR AVS SNAPSHOT
After Visit Summary   12/4/2017    Delio Agrawal    MRN: [id number]           Patient Information     Date Of Birth          1970        Visit Information        Provider Department      12/4/2017 2:00 PM Keyla Griffin MD [facility]        Today's Diagnoses     S/P total thyroidectomy    -  1      Care Instructions    Your PAMELA drain was discontinued today with out any complications. Follow up as scheduled for surgical follow up.   Sarthak GiraldoRN  [phone]              Follow-ups after your visit        Your next 10 appointments already scheduled     Dec 18, 2017  4:45 PM CST   LAB with [facility] ([facility])    [address] [address] [phone]           Please do not eat 10-12 hours before your appointment if you are coming in fasting for labs on lipids, cholesterol, or glucose (sugar). This does not apply to pregnant women. Water, hot tea and black coffee (with nothing added) are okay. Do not drink other fluids, diet soda or chew gum.              Who to contact     Please call your clinic at [phone] to:    Ask questions about your health    Make or cancel appointments    Discuss your medicines    Learn about your test results    Speak to your doctor   If you have compliments or concerns about an experience at your clinic, or if you wish to file a complaint, please contact [facility] Patient Relations at [phone] or email us at [email or web address]         Additional Information About Your Visit        MyChart Information     Jumpstarter is an electronic gateway that provides easy, online access to your medical records. With [employer], you can request a clinic appointment, read your test results, renew a prescription or communicate with your care team.     To sign up for [employer] visit the website at [email or web address]   You will be  asked to enter the access code listed below, as well as some personal information. Please follow the directions to create your username and password.     Your access code is: [email or web address]  Expires: 2017  5:30 AM     Your access code will  in 90 days. If you need help or a new code, please contact your [facility] or call [phone] for assistance.        Care EveryWhere ID     This is your Care EveryWhere ID. This could be used by other organizations to access your [facility] medical records  [id number]         Blood Pressure from Last 3 Encounters:   17 112/72   17 113/70   10/09/17 (P) 114/75    Weight from Last 3 Encounters:   17 72.6 kg (160 lb)   17 73.7 kg (162 lb 7.7 oz)   17 73.5 kg (162 lb)              Today, you had the following     No orders found for display         Today's Medication Changes          These changes are accurate as of: 17 11:59 PM.  If you have any questions, ask your nurse or doctor.               Stop taking these medicines if you haven't already. Please contact your care team if you have questions.     oxyCODONE IR 5 MG tablet   Commonly known as:  ROXICODONE   Stopped by:  Keyla Griffin MD                    Primary Care Provider Fax #    Physician No Ref-Primary [phone]       No address on file        Equal Access to Services     [facility] : Hadii norma dillo Soadelina, waaxda luqadaha, qaybta kaalmada adenegroyada, asiya de leon . So [facility] [phone].    ATENCIÓN: Si habla español, tiene a clemente disposición servicios gratuitos de asistencia lingüística. Llame al [phone].    We comply with applicable federal civil rights laws and [place] laws. We do not discriminate on the basis of race, color, national origin, age, disability, sex, sexual orientation, or gender identity.            Thank you!     Thank you for choosing [facility] E[facility]  for your  care. Our goal is always to provide you with excellent care. Hearing back from our patients is one way we can continue to improve our services. Please take a few minutes to complete the written survey that you may receive in the mail after your visit with us. Thank you!             Your Updated Medication List - Protect others around you: Learn how to safely use, store and throw away your medicines at www.disposemymeds.org.          This list is accurate as of: 12/4/17 11:59 PM.  Always use your most recent med list.                   Brand Name Dispense Instructions for use Diagnosis    calcitRIOL 0.25 MCG capsule    ROCALTROL    60 capsule    Take 1 capsule (0.25 mcg) by mouth 3 times daily    Goiter       calcium carbonate 500 MG chewable tablet    TUMS    150 tablet    Take 2 tablets (1,000 mg) by mouth 3 times daily    Goiter       levothyroxine 125 MCG tablet    SYNTHROID/LEVOTHROID    30 tablet    Take 1 tablet (125 mcg) by mouth every morning (before breakfast)    Status post total thyroidectomy       senna-docusate 8.6-50 MG per tablet    SENOKOT-S;PERICOLACE    30 tablet    Take 1-2 tablets by mouth 2 times daily Take while on oral narcotics to prevent or treat constipation.    Goiter

## 2017-12-04 NOTE — LETTER
2017       RE: Abdiel Agrawal  [address]  [place] [address]     Dear Colleague,    Thank you for referring your patient, Abdiel Agrawal, to the [facility] at [facility]. Please see a copy of my visit note below.    This is a 47-year-old female who underwent excision of a very large thyroid gland with substernal component for toxic multinodular goiter on 2017.  She presents today for drain removal.  Since the surgery, the patient has had symptoms of hypocalcemia with tingling in her fingers.  She will increase her calcium, Tums to 2 Tums every 2 hours for 48 hours and then resume 2 Tums 4 times a day.  She has no problems with eating or drinking.  The drain was removed without problems.      ASSESSMENT:  Followup status post total thyroidectomy and for drain removal.      PLAN:  I reviewed with the patient her current activity and management of the drain site.  I will see her 2 weeks postop.         LETICIA GRIFFIN MD             D: 2017 11:55   T: 2017 12:45   MT: CHEKO      Name:     ABDIEL AGRAWAL   MRN:      [id number]        Account:      [id number]   :      1970           Service Date: 2017      Document: [id number]       Again, thank you for allowing me to participate in the care of your patient.      Sincerely,    Leticia Griffin MD

## 2017-12-05 LAB — COPATH REPORT: NORMAL

## 2017-12-06 NOTE — OP NOTE
DATE OF PROCEDURE:  12/01/2017.      PREOPERATIVE DIAGNOSIS:  Toxic multinodular goiter with a substernal component.      POSTOPERATIVE DIAGNOSIS:  Toxic multinodular goiter with a substernal component.      SURGICAL PROCEDURE PERFORMED:  Total thyroidectomy with 150 minutes of intraoperative recurrent laryngeal nerve monitoring.      SURGEON:  Keyla Griffin MD      ASSISTANT:  Dr. Sarahi Stewart.      ANESTHESIA:  General endotracheal with nerve monitoring endotracheal tube.      COMPLICATIONS:  None.      ESTIMATED BLOOD LOSS:  30 mL.      CLINICAL INDICATIONS FOR THE PROCEDURE:  Delio Agrawal is a 47-year-old female who was noted to have a toxic multinodular goiter, largest on the left.  Based on her symptoms, increasing size and hyperthyroidism, it was recommended that she undergo total thyroidectomy.  The surgical procedure was discussed with the patient, including but not limited to the risks of bleeding, infection, injury to the recurrent laryngeal nerve or nerves, potential permanent hypocalcemia or potential loss of airway.  The patient is aware of this and has agreed to proceed with surgery and consent was obtained and the site was marked.      DETAILS OF PROCEDURE:  The patient was brought to the operating room in stable condition, placed on the operating table in supine position.  After appropriate general anesthesia was obtained, the patient was prepped and draped in sterile fashion.  A timeout was then performed.  A 7-8 cm incision was made over the anterior neck following natural skin crease.  The platysma was then divided and subplatysmal planes were then created.  The strap muscles were divided at the midline and retracted laterally.  We were concentrated on the left lobe of the thyroid gland first.  Almost immediately, we realized that it was necessary to divide the strap muscles on the left side.  This permitted visualization and assisted with mobilization of the left thyroid gland.  The left  lobe of the thyroid gland was retracted medially.  On the lateral border, the left middle thyroid vein and its multiple branches were separately surgically tied and then divided using a LigaSure.  We carried our dissection cephalad initially to mobilize the left superior thyroidal vessels.  The left superior thyroidal vessels were skeletonized, surgically tied twice superiorly, once inferiorly and then divided.  This permitted mobilization better of the gland medially.  As we carried our dissection inferiorly, almost immediately we recognized that despite the ultrasound identifying this, there was a very large substernal component extending down to almost the aortic arch on the left side.  Therefore, we determined at this point that it would be necessary to divide the thyroid gland over the anterior portion of the trachea at the isthmus.  There was a significant amount of vasculature over this area that was consistent with her hyperthyroidism.  The vessels were separately skeletonized, clipped and divided.  We were able to divide the thyroid gland at the midline.  This permitted us to dissect from a medial to lateral manner over the inferior aspect of the left lobe of the thyroid.  Each vessel was separately skeletonized, tied and then divided.  Once the inferior thyroidal vessels anteriorly were divided, we were able to much more easily rotate this gland up and then dissect it up and out of the operative bed rotating the gland over the anterior portion of the trachea.  Again, there was a large component substernally.  As we dissected from the middle thyroid vein inferiorly, we followed the carotid artery from a superior to inferior manner and we palpated above what was quite likely the aortic arch and dissected the thyroid gland up and out into the field.  We then identified the left recurrent laryngeal nerve.  We followed this initially inferiorly, dissecting the remaining portion of the thyroid gland off from it  anteriorly.  Vascular control was obtained using surgical ties.  We then carried our dissection cephalad and identified the left superior parathyroid gland and dissected it from the undersurface of the thyroid, maintaining its viability.  The left recurrent laryngeal nerve remained intact during this entire process.  The thyroid gland was then dissected over the anterior portion of the trachea.  The specimen was sent separately as left lobe of thyroid.  Prior to proceeding to resecting the right lobe, the left recurrent laryngeal nerve was noted to be intact visibly and confirmed to be intact with nerve stimulation.  The left superior parathyroid gland appeared to be intact and viable.  The left inferior parathyroid gland was not necessarily identified because of the large substernal thyroid goiter.  The specimen was again sent separately as left lobe of thyroid.  We now concentrated on resecting the right lobe of the thyroid gland.  There was only about 1-1/2 times the size of normal.  The superior thyroidal vessels were then identified, skeletonized, surgically tied and then divided using a LigaSure.  We carried our dissection from a superior to inferior manner, skeletonizing the right middle thyroid vein, surgically tying this and then dividing it.  As we rotated the gland from lateral to medial manner,  we did identify the right superior parathyroid gland, dissecting it from the undersurface of the thyroid.  The right recurrent laryngeal nerve was identified, followed from a superior to inferior manner, dissecting the thyroid gland off it anteriorly.  As we carried our dissection inferiorly, we identified the right inferior parathyroid gland dissecting it from the undersurface of the thyroid, maintaining its viability.  The right inferior thyroidal vessels were then skeletonized, surgically tied and then divided.  The specimen was then completely removed.  The neck was copiously irrigated.  A Valsalva maneuver  was performed.  What minimal amount of bleeding was noted was easily controlled with bipolar cautery.  Both right and left recurrent laryngeal nerves were intact visibly and confirmed to be intact with nerve stimulation.  Right superior, right inferior and left superior parathyroid glands appeared to be intact and viable at the conclusion of the case.  Fibrillar was then placed in the operative bed.  The left strap muscles were reapproximated using a 3-0 Vicryl horizontal mattress suture.  A drain was then placed in the operative bed, exited inferolateral to the left, just over the left clavicle.  The strap muscles were then approximated at the midline using a 3-0 chromic interrupted suture.  The platysma was approximated using a 3-0 chromic interrupted suture, and the skin incision was approximated using a 5-0 Monocryl running subcuticular stitch.  The wound was dressed with Dermabond.  The patient was then extubated and returned to the recovery room in stable condition.      I was present during the entire surgical procedure.         LETICIA FRAGOSO MD             D: 2017 15:10   T: 2017 07:08   MT: coleen      Name:     ABDIEL ROSS   MRN:      [id number]        Account:        [id number]   :      1970           Procedure Date: 2017      Document: [id number]

## 2017-12-08 NOTE — PROGRESS NOTES
This is a 47-year-old female who underwent excision of a very large thyroid gland with substernal component for toxic multinodular goiter on 2017.  She presents today for drain removal.  Since the surgery, the patient has had symptoms of hypocalcemia with tingling in her fingers.  She will increase her calcium, Tums to 2 Tums every 2 hours for 48 hours and then resume 2 Tums 4 times a day.  She has no problems with eating or drinking.  The drain was removed without problems.      ASSESSMENT:  Followup status post total thyroidectomy and for drain removal.      PLAN:  I reviewed with the patient her current activity and management of the drain site.  I will see her 2 weeks postop.         LETICIA FRAGOSO MD             D: 2017 11:55   T: 2017 12:45   MT: CHEKO      Name:     ADBIEL ROSS   MRN:      [id number]        Account:      [id number]   :      1970           Service Date: 2017      Document: [id number]

## 2017-12-18 ENCOUNTER — OFFICE VISIT (OUTPATIENT)
Dept: OTOLARYNGOLOGY | Facility: CLINIC | Age: 47
End: 2017-12-18
Payer: COMMERCIAL

## 2017-12-18 VITALS — WEIGHT: 160 LBS | BODY MASS INDEX: 30.21 KG/M2 | HEIGHT: 61 IN

## 2017-12-18 DIAGNOSIS — E89.0 S/P TOTAL THYROIDECTOMY: ICD-10-CM

## 2017-12-18 DIAGNOSIS — E89.0 S/P TOTAL THYROIDECTOMY: Primary | ICD-10-CM

## 2017-12-18 LAB
CALCIUM SERPL-MCNC: 8.6 MG/DL (ref 8.5–10.1)
PTH-INTACT SERPL-MCNC: <3 PG/ML (ref 12–72)

## 2017-12-18 ASSESSMENT — PAIN SCALES - GENERAL: PAINLEVEL: MILD PAIN (2)

## 2017-12-18 NOTE — LETTER
12/18/2017       RE: Delio Agrawal  [address]  [facility] [place] [facility] [address]     Dear Colleague,    Thank you for referring your patient, Delio Agrawal, to the [facility] EAR NOSE AND THROAT at [facility]. Please see a copy of my visit note below.    Patient presents today with 2 week follow up of total thyroidectomy for TMNG. She admits to feeling more fatiqued than pre-op. Otherwise no problems with voice quality, inspiration or swallowing. No sx of hypocalcemia    PE: wound is healing well. No evidence of hematoma, seroma or infection    Discharge meds: LT4 125 mcg daily, calcium and vitamin d    Asses: s/p total thyroidectomy    Plan:  Reviewed with patient wound management and massage  Check TFT's at 4 weeks post op  Check Ca/Vit D/ PTH today    Again, thank you for allowing me to participate in the care of your patient.      Sincerely,    Keyla Griffin MD

## 2017-12-18 NOTE — PATIENT INSTRUCTIONS
Please have labs drawn and we will notify you of the results once they are completed.   Sarthak Giraldo RN  [phone]

## 2017-12-18 NOTE — NURSING NOTE
Chief Complaint   Patient presents with     RECHECK     post op12/1/17 rt baljinder Coughlin Medical Assistant

## 2017-12-18 NOTE — MR AVS SNAPSHOT
After Visit Summary   2017    Delio Agrawal    MRN: [id number]           Patient Information     Date Of Birth          1970        Visit Information        Provider Department      2017 3:30 PM Keyla Griffin MD [facility]        Today's Diagnoses     S/P total thyroidectomy    -  1      Care Instructions    Please have labs drawn and we will notify you of the results once they are completed.   Sarthak Giraldo RN  [phone]              Follow-ups after your visit        Who to contact     Please call your clinic at [phone] to:    Ask questions about your health    Make or cancel appointments    Discuss your medicines    Learn about your test results    Speak to your doctor   If you have compliments or concerns about an experience at your clinic, or if you wish to file a complaint, please contact [facility] Patient Relations at [phone] or email us at [email or web address]         Additional Information About Your Visit        MyChart Information     eShares is an electronic gateway that provides easy, online access to your medical records. With eShares, you can request a clinic appointment, read your test results, renew a prescription or communicate with your care team.     To sign up for eShares visit the website at [email or web address]   You will be asked to enter the access code listed below, as well as some personal information. Please follow the directions to create your username and password.     Your access code is: [email or web address]  Expires: 3/27/2018  6:31 AM     Your access code will  in 90 days. If you need help or a new code, please contact your [facility] or call [phone] for assistance.        Care EveryWhere ID     This is your Care EveryWhere ID. This could be used by other organizations to access your [place] medical records  [id number]       "  Your Vitals Were     Height BMI (Body Mass Index)                1.537 m (5' 0.51\") 30.72 kg/m2           Blood Pressure from Last 3 Encounters:   12/29/17 106/71   12/02/17 112/72   11/28/17 113/70    Weight from Last 3 Encounters:   12/29/17 73.6 kg (162 lb 3.2 oz)   12/18/17 72.6 kg (160 lb)   12/01/17 73.7 kg (162 lb 7.7 oz)               Primary Care Provider Fax #    Physician No Ref-Primary [phone]       No address on file        Equal Access to Services     JA[facility] CenterLAINA : Hadii norma Olivares, gonzales armenta, kumar kaalmapetr benjamin, asiya de leon . So [facility] [phone].    ATENCIÓN: Si habla español, tiene a clemente disposición servicios gratuitos de asistencia lingüística. Ll[facility] [phone].    We comply with applicable federal civil rights laws and [place] laws. We do not discriminate on the basis of race, color, national origin, age, disability, sex, sexual orientation, or gender identity.            Thank you!     Thank you for choosing [facility] EAR NOSE AND THROAT  for your care. Our goal is always to provide you with excellent care. Hearing back from our patients is one way we can continue to improve our services. Please take a few minutes to complete the written survey that you may receive in the mail after your visit with us. Thank you!             Your Updated Medication List - Protect others around you: Learn how to safely use, store and throw away your medicines at www.disposemymeds.org.          This list is accurate as of: 12/18/17 11:59 PM.  Always use your most recent med list.                   Brand Name Dispense Instructions for use Diagnosis    calcitRIOL 0.25 MCG capsule    ROCALTROL    60 capsule    Take 1 capsule (0.25 mcg) by mouth 3 times daily    Goiter       calcium carbonate 500 MG chewable tablet    TUMS    150 tablet    Take 2 tablets (1,000 mg) by mouth 3 times daily    Goiter       levothyroxine 125 MCG tablet    " SYNTHROID/LEVOTHROID    30 tablet    Take 1 tablet (125 mcg) by mouth every morning (before breakfast)    Status post total thyroidectomy       senna-docusate 8.6-50 MG per tablet    SENOKOT-S;PERICOLACE    30 tablet    Take 1-2 tablets by mouth 2 times daily Take while on oral narcotics to prevent or treat constipation.    Goiter

## 2017-12-18 NOTE — LETTER
Date:January 4, 2018      Patient was self referred, no letter generated. Do not send.        [facility] Health Information

## 2017-12-19 ENCOUNTER — TELEPHONE (OUTPATIENT)
Dept: OTOLARYNGOLOGY | Facility: CLINIC | Age: 47
End: 2017-12-19

## 2017-12-19 DIAGNOSIS — E89.0 POSTSURGICAL HYPOTHYROIDISM: Primary | ICD-10-CM

## 2017-12-19 LAB — DEPRECATED CALCIDIOL+CALCIFEROL SERPL-MC: 29 UG/L (ref 20–75)

## 2017-12-19 NOTE — TELEPHONE ENCOUNTER
"The patient was called and provided instructions for a repeat lab draw in about 2 weeks. \"Please let patient know she should be taking her over the counter vitamin d and to continue 2 tums 3 times a day. Repeat Ca, PTH and Vitamin D in 2 weeks\"  "

## 2017-12-29 ENCOUNTER — OFFICE VISIT (OUTPATIENT)
Dept: OBGYN | Facility: CLINIC | Age: 47
End: 2017-12-29
Payer: COMMERCIAL

## 2017-12-29 VITALS
BODY MASS INDEX: 27.02 KG/M2 | HEART RATE: 65 BPM | HEIGHT: 65 IN | SYSTOLIC BLOOD PRESSURE: 106 MMHG | WEIGHT: 162.2 LBS | DIASTOLIC BLOOD PRESSURE: 71 MMHG

## 2017-12-29 DIAGNOSIS — N63.0 BREAST MASS: ICD-10-CM

## 2017-12-29 DIAGNOSIS — N64.4 BREAST PAIN: Primary | ICD-10-CM

## 2017-12-29 PROCEDURE — 99212 OFFICE O/P EST SF 10 MIN: CPT | Mod: ZF

## 2017-12-29 ASSESSMENT — PAIN SCALES - GENERAL: PAINLEVEL: NO PAIN (0)

## 2017-12-29 NOTE — PROGRESS NOTES
"S GYN Visit     S: Delio states for the past month, she has had intermittent right breast pain at the location where a biopsy was taken in 2017.  The pain is sharp, lasts for a couple of minutes, then resolves.  No injury to the area.  No redness, rash or nipple discharge.  No family history of breast disease.  Of note, she is s/p total Thyroidectomy of substernal goiter on 2017 for hyperthyroidism and toxic multinodular goiter.     O:   /71 (BP Location: Left arm, Patient Position: Chair)  Pulse 65  Ht 1.651 m (5' 5\")  Wt 73.6 kg (162 lb 3.2 oz)  LMP 2017  BMI 26.99 kg/m2     Gen: Pleasant, NAD   CV: Regular rate   Resp: Non-labored breathing   Psych: Good eye contact, bright affect   Neuro: A&O x3    Breast: Symmetric, no nipple discharge or retraction, no axillary lymphadenopathy.  She does a firm cord on the right breast at 3 o'clock extending medially about 1 cm width by 4 cm long (with distal end being 5 cm from nipple).     Imaging:   Results for ultrasound guided biopsy in the right breast performed  17 are benign and concordant, listed below.     A right breast 6 month follow up mammogram is recommended for the  right breast focal asymmetry, which was less conspicuous with  tomosynthesis views at time of diagnostic mammogram.  ------------  FINAL DIAGNOSIS:   Breast, right, 3:00, 5 cm from nipple, ultrasound guided core biopsy:   -Collagenous stroma containing sparse ducts and terminal duct lobular   units, with minimal periductal chronic inflammation   -No atypical or malignant findings   -No discrete mass causing lesion identified   -See comment     A/P: Delio Agrwaal is a 47 year old  with intermittent right breast pain at location of prior biopsy (benign, see above) without recommended follow up.   - Diagnostic mammo ordered as well as right breast US  - S/p recent pre-op/annual exam from PCP prior to surgery. Reports she is up to date on immunization and health " screening.  - NILM pap, HPV neg in 12/2016  - Recommended annual pelvic/GYN exam     Bryanna Meza MD   OB/Gyn Resident, PGY-4  December 29, 2017, 3:54 PM     Patient was seen by the resident in [facility] Care [facility].  I reviewed the history & exam.  The patient's assessment and plan were made jointly.    Laura Townsend MD MPH

## 2017-12-29 NOTE — NURSING NOTE
Chief Complaint   Patient presents with     RECHECK     pt had right breaast biopsy almost 1 year ago and is still having pain

## 2017-12-29 NOTE — PATIENT INSTRUCTIONS
Call and schedule breast mammography and ultrasound as soon as able to evaluate your breast pain/discomfort

## 2017-12-29 NOTE — MR AVS SNAPSHOT
After Visit Summary   2017    Delio Agrawal    MRN: [id number]           Patient Information     Date Of Birth          1970        Visit Information        Provider Department      2017 3:15 PM Bryanna Orlando MD [facility]        Today's Diagnoses     Breast pain    -  1    Breast mass          Care Instructions    Call and schedule breast mammography and ultrasound as soon as able to evaluate your breast pain/discomfort           Follow-ups after your visit        Follow-up notes from your care team     Return if symptoms worsen or fail to improve.      Who to contact     Please call your clinic at [phone] to:    Ask questions about your health    Make or cancel appointments    Discuss your medicines    Learn about your test results    Speak to your doctor   If you have compliments or concerns about an experience at your clinic, or if you wish to file a complaint, please contact [facility] Patient Relations at [phone] or email us at [email or web address]         Additional Information About Your Visit        MyChart Information     [employer] is an electronic gateway that provides easy, online access to your medical records. With [employer], you can request a clinic appointment, read your test results, renew a prescription or communicate with your care team.     To sign up for [employer] visit the website at [email or web address]   You will be asked to enter the access code listed below, as well as some personal information. Please follow the directions to create your username and password.     Your access code is: [email or web address]  Expires: 3/27/2018  6:31 AM     Your access code will  in 90 days. If you need help or a new code, please contact your [facility] or call [phone] for assistance.        Care EveryWhere ID     This is your Care EveryWhere ID. This could be used by  "other organizations to access your [place] medical records  [id number]        Your Vitals Were     Pulse Height Last Period BMI (Body Mass Index)          65 1.651 m (5' 5\") 12/22/2017 26.99 kg/m2         Blood Pressure from Last 3 Encounters:   12/29/17 106/71   12/02/17 112/72   11/28/17 113/70    Weight from Last 3 Encounters:   12/29/17 73.6 kg (162 lb 3.2 oz)   12/18/17 72.6 kg (160 lb)   12/01/17 73.7 kg (162 lb 7.7 oz)               Primary Care Provider Fax #    Physician No Ref-Primary [phone]       No address on file        Equal Access to Services     ANASTASIIA RYAN : Sepideh Olivares, gonzales armenta, qafariha kaalmada sourav, asiya kidd. So [facility] [phone].    ATENCIÓN: Si habla español, tiene a clemente disposición servicios gratuitos de asistencia lingüística. Llame al [phone].    We comply with applicable federal civil rights laws and [place] laws. We do not discriminate on the basis of race, color, national origin, age, disability, sex, sexual orientation, or gender identity.            Thank you!     Thank you for choosing [facility]  for your care. Our goal is always to provide you with excellent care. Hearing back from our patients is one way we can continue to improve our services. Please take a few minutes to complete the written survey that you may receive in the mail after your visit with us. Thank you!             Your Updated Medication List - Protect others around you: Learn how to safely use, store and throw away your medicines at www.disposemymeds.org.          This list is accurate as of: 12/29/17 11:59 PM.  Always use your most recent med list.                   Brand Name Dispense Instructions for use Diagnosis    calcitRIOL 0.25 MCG capsule    ROCALTROL    60 capsule    Take 1 capsule (0.25 mcg) by mouth 3 times daily    Goiter       calcium carbonate 500 MG chewable tablet    TUMS    150 tablet    Take 2 " tablets (1,000 mg) by mouth 3 times daily    Goiter       levothyroxine 125 MCG tablet    SYNTHROID/LEVOTHROID    30 tablet    Take 1 tablet (125 mcg) by mouth every morning (before breakfast)    Status post total thyroidectomy       senna-docusate 8.6-50 MG per tablet    SENOKOT-S;PERICOLACE    30 tablet    Take 1-2 tablets by mouth 2 times daily Take while on oral narcotics to prevent or treat constipation.    Goiter       Vitamin D-3 5000 UNITS Tabs      Take 5,000 Units by mouth 3 times daily

## 2018-01-03 NOTE — PROGRESS NOTES
Patient presents today with 2 week follow up of total thyroidectomy for TMNG. She admits to feeling more fatiqued than pre-op. Otherwise no problems with voice quality, inspiration or swallowing. No sx of hypocalcemia    PE: wound is healing well. No evidence of hematoma, seroma or infection    Discharge meds: LT4 125 mcg daily, calcium and vitamin d    Asses: s/p total thyroidectomy    Plan:  Reviewed with patient wound management and massage  Check TFT's at 4 weeks post op  Check Ca/Vit D/ PTH today

## 2018-01-05 DIAGNOSIS — E89.0 POSTSURGICAL HYPOTHYROIDISM: ICD-10-CM

## 2018-01-05 LAB
CALCIUM SERPL-MCNC: 8.6 MG/DL (ref 8.5–10.1)
PTH-INTACT SERPL-MCNC: <3 PG/ML (ref 12–72)

## 2018-01-08 LAB — DEPRECATED CALCIDIOL+CALCIFEROL SERPL-MC: 29 UG/L (ref 20–75)

## 2018-01-29 ENCOUNTER — CARE COORDINATION (OUTPATIENT)
Dept: OTOLARYNGOLOGY | Facility: CLINIC | Age: 48
End: 2018-01-29

## 2018-01-29 DIAGNOSIS — E04.9 GOITER: ICD-10-CM

## 2018-01-29 DIAGNOSIS — E89.0 STATUS POST TOTAL THYROIDECTOMY: ICD-10-CM

## 2018-01-29 DIAGNOSIS — E05.90 HYPERTHYROIDISM: Primary | ICD-10-CM

## 2018-01-29 RX ORDER — LEVOTHYROXINE SODIUM 125 UG/1
125 TABLET ORAL
Qty: 30 TABLET | Refills: 3 | Status: SHIPPED | OUTPATIENT
Start: 2018-01-29 | End: 2018-04-20 | Stop reason: DRUGHIGH

## 2018-01-29 RX ORDER — CALCIUM CARBONATE 500 MG/1
2 TABLET, CHEWABLE ORAL 3 TIMES DAILY
Qty: 150 TABLET | Refills: 1 | Status: SHIPPED | OUTPATIENT
Start: 2018-01-29 | End: 2019-04-16

## 2018-01-29 RX ORDER — CALCITRIOL 0.25 UG/1
0.25 CAPSULE, LIQUID FILLED ORAL 3 TIMES DAILY
Qty: 60 CAPSULE | Refills: 1 | Status: SHIPPED | OUTPATIENT
Start: 2018-01-29 | End: 2018-04-20 | Stop reason: DRUGHIGH

## 2018-01-29 NOTE — PROGRESS NOTES
The patient's  called our triage line today with inquires about lab results from earlier this month and medication refills.  Results were given over the phone. Will reach out to the RNCC that works with Dr. Griffin to follow up on refills and medication continuation. An understanding of this plan was voiced.  She has three days left of her current meds.    Sabina Peralta R.N. B.S.N.  Nurse Coordinator Head & Neck Surgery  [phone]  1/29/2018 10:56 AM

## 2018-01-29 NOTE — PROGRESS NOTES
The patient was called and a message was left to keep taking her calcitrol and tums and have labs rechecked in about 6 weeks.  Sarthak Giraldo ,RN  [phone]

## 2018-03-06 DIAGNOSIS — E05.90 HYPERTHYROIDISM: ICD-10-CM

## 2018-03-06 LAB
CALCIUM SERPL-MCNC: 8.6 MG/DL (ref 8.5–10.1)
PTH-INTACT SERPL-MCNC: <7 PG/ML (ref 18–80)

## 2018-03-07 LAB — DEPRECATED CALCIDIOL+CALCIFEROL SERPL-MC: 30 UG/L (ref 20–75)

## 2018-04-19 DIAGNOSIS — E89.0 POSTSURGICAL HYPOTHYROIDISM: Primary | ICD-10-CM

## 2018-04-20 ENCOUNTER — TELEPHONE (OUTPATIENT)
Dept: OTOLARYNGOLOGY | Facility: CLINIC | Age: 48
End: 2018-04-20

## 2018-04-20 DIAGNOSIS — E89.0 POSTSURGICAL HYPOTHYROIDISM: ICD-10-CM

## 2018-04-20 PROBLEM — E05.90 HYPERTHYROIDISM: Status: RESOLVED | Noted: 2017-10-23 | Resolved: 2018-04-20

## 2018-04-20 PROBLEM — E83.51 HYPOCALCEMIA: Status: ACTIVE | Noted: 2018-04-20

## 2018-04-20 LAB
CALCIUM SERPL-MCNC: 5.9 MG/DL (ref 8.5–10.1)
CREAT SERPL-MCNC: 0.76 MG/DL (ref 0.52–1.04)
GFR SERPL CREATININE-BSD FRML MDRD: 81 ML/MIN/1.7M2
PHOSPHATE SERPL-MCNC: 4.4 MG/DL (ref 2.5–4.5)
T4 FREE SERPL-MCNC: 1.48 NG/DL (ref 0.76–1.46)
TSH SERPL DL<=0.005 MIU/L-ACNC: 0.01 MU/L (ref 0.4–4)

## 2018-04-20 NOTE — TELEPHONE ENCOUNTER
The patient and Dr Griffin was called about Fatimas' labs that were collected today. Her calcium was 5.9 and her TSH was 0.01. The patient is set for a phone conference with Dr Proctor today at 4PM and Dr Griffin was also going to call her to go over plan.  Sarthak Giraldo ,RN  [phone]

## 2018-04-23 DIAGNOSIS — E83.51 HYPOCALCEMIA: ICD-10-CM

## 2018-04-23 LAB
CA-I SERPL ISE-MCNC: 4.2 MG/DL (ref 4.4–5.2)
CALCIUM SERPL-MCNC: 8.9 MG/DL (ref 8.5–10.1)
CREAT SERPL-MCNC: 0.79 MG/DL (ref 0.52–1.04)
GFR SERPL CREATININE-BSD FRML MDRD: 78 ML/MIN/1.7M2

## 2018-04-23 PROCEDURE — 82310 ASSAY OF CALCIUM: CPT

## 2018-04-23 PROCEDURE — 82565 ASSAY OF CREATININE: CPT

## 2018-04-23 PROCEDURE — 82330 ASSAY OF CALCIUM: CPT

## 2018-04-23 PROCEDURE — 36415 COLL VENOUS BLD VENIPUNCTURE: CPT

## 2018-04-30 ENCOUNTER — OFFICE VISIT (OUTPATIENT)
Dept: FAMILY MEDICINE | Facility: CLINIC | Age: 48
End: 2018-04-30
Payer: COMMERCIAL

## 2018-04-30 VITALS
BODY MASS INDEX: 26.46 KG/M2 | HEIGHT: 65 IN | TEMPERATURE: 97.6 F | HEART RATE: 75 BPM | SYSTOLIC BLOOD PRESSURE: 106 MMHG | WEIGHT: 158.8 LBS | DIASTOLIC BLOOD PRESSURE: 66 MMHG

## 2018-04-30 DIAGNOSIS — E55.9 VITAMIN D DEFICIENCY: ICD-10-CM

## 2018-04-30 DIAGNOSIS — E83.51 HYPOCALCEMIA: ICD-10-CM

## 2018-04-30 DIAGNOSIS — R53.83 FATIGUE, UNSPECIFIED TYPE: Primary | ICD-10-CM

## 2018-04-30 DIAGNOSIS — E89.0 STATUS POST TOTAL THYROIDECTOMY: ICD-10-CM

## 2018-04-30 DIAGNOSIS — E53.8 VITAMIN B12 DEFICIENCY (NON ANEMIC): ICD-10-CM

## 2018-04-30 LAB
ERYTHROCYTE [DISTWIDTH] IN BLOOD BY AUTOMATED COUNT: 12.4 % (ref 10–15)
HCT VFR BLD AUTO: 41.8 % (ref 35–47)
HGB BLD-MCNC: 13.8 G/DL (ref 11.7–15.7)
MCH RBC QN AUTO: 31.1 PG (ref 26.5–33)
MCHC RBC AUTO-ENTMCNC: 33 G/DL (ref 31.5–36.5)
MCV RBC AUTO: 94 FL (ref 78–100)
PLATELET # BLD AUTO: 182 10E9/L (ref 150–450)
RBC # BLD AUTO: 4.44 10E12/L (ref 3.8–5.2)
WBC # BLD AUTO: 9.5 10E9/L (ref 4–11)

## 2018-04-30 PROCEDURE — 82306 VITAMIN D 25 HYDROXY: CPT | Performed by: NURSE PRACTITIONER

## 2018-04-30 PROCEDURE — 99000 SPECIMEN HANDLING OFFICE-LAB: CPT | Performed by: NURSE PRACTITIONER

## 2018-04-30 PROCEDURE — 82607 VITAMIN B-12: CPT | Performed by: NURSE PRACTITIONER

## 2018-04-30 PROCEDURE — 83018 HEAVY METAL QUAN EACH NES: CPT | Mod: 90 | Performed by: NURSE PRACTITIONER

## 2018-04-30 PROCEDURE — 36415 COLL VENOUS BLD VENIPUNCTURE: CPT | Performed by: NURSE PRACTITIONER

## 2018-04-30 PROCEDURE — 99213 OFFICE O/P EST LOW 20 MIN: CPT | Performed by: NURSE PRACTITIONER

## 2018-04-30 PROCEDURE — 80053 COMPREHEN METABOLIC PANEL: CPT | Performed by: NURSE PRACTITIONER

## 2018-04-30 PROCEDURE — 85027 COMPLETE CBC AUTOMATED: CPT | Performed by: NURSE PRACTITIONER

## 2018-04-30 NOTE — PROGRESS NOTES
"  SUBJECTIVE:   Delio Agrawal is a 47 year old female who presents to clinic today for the following health issues:    Pt is here for a follow up.   Would like labs done to look at liver function, vitamin D, B12, calcium, and iodine.    Had f/u with endocrine last week.    Has been supplementing calcium and iodine.      Problem list and histories reviewed & adjusted, as indicated.  Additional history: as documented    Reviewed and updated as needed this visit by clinical staff  Tobacco  Allergies  Meds  Problems  Med Hx  Surg Hx  Fam Hx  Soc Hx        Reviewed and updated as needed this visit by Provider  Tobacco  Allergies  Meds  Problems  Med Hx  Surg Hx  Fam Hx  Soc Hx          ROS:  Constitutional, HEENT, cardiovascular, pulmonary, GI, , musculoskeletal, neuro, skin, endocrine and psych systems are negative, except as otherwise noted.    OBJECTIVE:     /66  Pulse 75  Temp 97.6  F (36.4  C) (Oral)  Ht 5' 5\" (1.651 m)  Wt 158 lb 12.8 oz (72 kg)  BMI 26.43 kg/m2  Body mass index is 26.43 kg/(m^2).  GENERAL: healthy, alert and no distress  NECK: no adenopathy, no asymmetry, masses and trachea midline and normal to palpation  RESP: lungs clear to auscultation - no rales, rhonchi or wheezes  CV: regular rate and rhythm, normal S1 S2, no S3 or S4, no murmur, click or rub, no peripheral edema and peripheral pulses strong  ABDOMEN: soft, nontender, no hepatosplenomegaly, no masses and bowel sounds normal  MS: no gross musculoskeletal defects noted, no edema  SKIN: no suspicious lesions or rashes    Results for orders placed or performed in visit on 04/30/18   CBC with platelets   Result Value Ref Range    WBC 9.5 4.0 - 11.0 10e9/L    RBC Count 4.44 3.8 - 5.2 10e12/L    Hemoglobin 13.8 11.7 - 15.7 g/dL    Hematocrit 41.8 35.0 - 47.0 %    MCV 94 78 - 100 fl    MCH 31.1 26.5 - 33.0 pg    MCHC 33.0 31.5 - 36.5 g/dL    RDW 12.4 10.0 - 15.0 %    Platelet Count 182 150 - 450 10e9/L "         ASSESSMENT/PLAN:       ICD-10-CM    1. Fatigue, unspecified type R53.83    2. Status post total thyroidectomy E89.0 Vitamin D Deficiency     Comprehensive metabolic panel     Vitamin B12     Iodine Serum     CBC with platelets   3. Hypocalcemia E83.51    4. Vitamin D deficiency E55.9 Vitamin D Deficiency   5. Vitamin B12 deficiency (non anemic) E53.8 Vitamin B12     To f/u with endocrine as planned.    See Patient Instructions    CHANDRAKANT De La Paz CNP  [facility]

## 2018-04-30 NOTE — MR AVS SNAPSHOT
After Visit Summary   4/30/2018    Delio Agrawal    MRN: [id number]           Patient Information     Date Of Birth          1970        Visit Information        Provider Department      4/30/2018 4:20 PM Cassandra Apodaca APRN CNP [facility]        Today's Diagnoses     Fatigue, unspecified type    -  1    Status post total thyroidectomy        Hypocalcemia        Vitamin D deficiency        Vitamin B12 deficiency (non anemic)           Follow-ups after your visit        Your next 10 appointments already scheduled     Jun 06, 2018  2:30 PM CDT   (Arrive by 2:15 PM)   RETURN ENDOCRINE with Birdie Proctor MD   [facility] ([facility])    [address] [address] [phone]              Who to contact     If you have questions or need follow up information about today's clinic visit or your schedule please contact [facility] directly at [phone].  Normal or non-critical lab and imaging results will be communicated to you by MyChart, letter or phone within 4 business days after the clinic has received the results. If you do not hear from us within 7 days, please contact the clinic through [employer] or phone. If you have a critical or abnormal lab result, we will notify you by phone as soon as possible.  Submit refill requests through [employer] or call your pharmacy and they will forward the refill request to us. Please allow 3 business days for your refill to be completed.          Additional Information About Your Visit        MyChart Information     [employer] gives you secure access to your electronic health record. If you see a primary care provider, you can also send messages to your care team and make appointments. If you have questions, please call your primary care clinic.  If you do not have a primary care provider, please call [phone] and they will assist you.       "  Care EveryWhere ID     This is your Care EveryWhere ID. This could be used by other organizations to access your [place] medical records  [id number]        Your Vitals Were     Pulse Temperature Height BMI (Body Mass Index)          75 97.6  F (36.4  C) (Oral) 5' 5\" (1.651 m) 26.43 kg/m2         Blood Pressure from Last 3 Encounters:   04/30/18 106/66   12/29/17 106/71   12/02/17 112/72    Weight from Last 3 Encounters:   04/30/18 158 lb 12.8 oz (72 kg)   12/29/17 162 lb 3.2 oz (73.6 kg)   12/18/17 160 lb (72.6 kg)              We Performed the Following     CBC with platelets     Comprehensive metabolic panel     Iodine Serum     Vitamin B12     Vitamin D Deficiency        Primary Care Provider Office Phone # Fax #    Cassandra Apodaca, APRN [phone] [address] [facility] [address]        Equal Access to Services     ANASTASIIA RYAN : Hadii aad ku hadasho Soomaali, waaxda luqadaha, qaybta kaalmada adeegyada, waxay idiin hayaan will de leon . So [facility] [phone].    ATENCIÓN: Si habla español, tiene a clemente disposición servicios gratuitos de asistencia lingüística. Riannaame al [phone].    We comply with applicable federal civil rights laws and [place] laws. We do not discriminate on the basis of race, color, national origin, age, disability, sex, sexual orientation, or gender identity.            Thank you!     Thank you for choosing [facility]  for your care. Our goal is always to provide you with excellent care. Hearing back from our patients is one way we can continue to improve our services. Please take a few minutes to complete the written survey that you may receive in the mail after your visit with us. Thank you!             Your Updated Medication List - Protect others around you: Learn how to safely use, store and throw away your medicines at www.disposemymeds.org.          This list is accurate as of 4/30/18 11:59 PM.  Always use your " most recent med list.                   Brand Name Dispense Instructions for use Diagnosis    calcitRIOL 0.5 MCG capsule    ROCALTROL    90 capsule    Take 1 capsule (0.5 mcg) by mouth daily    Hypocalcemia, Postsurgical hypothyroidism       calcium carbonate 500 MG chewable tablet    TUMS    150 tablet    Take 2 tablets (1,000 mg) by mouth 3 times daily    Goiter       levothyroxine 112 MCG tablet    SYNTHROID/LEVOTHROID    90 tablet    Take 1 tablet (112 mcg) by mouth daily    Hypocalcemia, Postsurgical hypothyroidism       senna-docusate 8.6-50 MG per tablet    SENOKOT-S;PERICOLACE    30 tablet    Take 1-2 tablets by mouth 2 times daily Take while on oral narcotics to prevent or treat constipation.    Goiter

## 2018-05-01 LAB
ALBUMIN SERPL-MCNC: 4.1 G/DL (ref 3.4–5)
ALP SERPL-CCNC: 57 U/L (ref 40–150)
ALT SERPL W P-5'-P-CCNC: 21 U/L (ref 0–50)
ANION GAP SERPL CALCULATED.3IONS-SCNC: 5 MMOL/L (ref 3–14)
AST SERPL W P-5'-P-CCNC: 21 U/L (ref 0–45)
BILIRUB SERPL-MCNC: 0.3 MG/DL (ref 0.2–1.3)
BUN SERPL-MCNC: 11 MG/DL (ref 7–30)
CALCIUM SERPL-MCNC: 8.5 MG/DL (ref 8.5–10.1)
CHLORIDE SERPL-SCNC: 103 MMOL/L (ref 94–109)
CO2 SERPL-SCNC: 32 MMOL/L (ref 20–32)
CREAT SERPL-MCNC: 0.75 MG/DL (ref 0.52–1.04)
DEPRECATED CALCIDIOL+CALCIFEROL SERPL-MC: 34 UG/L (ref 20–75)
GFR SERPL CREATININE-BSD FRML MDRD: 82 ML/MIN/1.7M2
GLUCOSE SERPL-MCNC: 86 MG/DL (ref 70–99)
POTASSIUM SERPL-SCNC: 3.6 MMOL/L (ref 3.4–5.3)
PROT SERPL-MCNC: 8.2 G/DL (ref 6.8–8.8)
SODIUM SERPL-SCNC: 140 MMOL/L (ref 133–144)
VIT B12 SERPL-MCNC: 656 PG/ML (ref 193–986)

## 2018-05-02 LAB — IODINE SERPL-MCNC: 128 NG/ML (ref 40–92)

## 2018-06-06 ENCOUNTER — OFFICE VISIT (OUTPATIENT)
Dept: ENDOCRINOLOGY | Facility: CLINIC | Age: 48
End: 2018-06-06
Payer: COMMERCIAL

## 2018-06-06 VITALS
HEART RATE: 75 BPM | DIASTOLIC BLOOD PRESSURE: 69 MMHG | BODY MASS INDEX: 26.27 KG/M2 | SYSTOLIC BLOOD PRESSURE: 111 MMHG | WEIGHT: 157.7 LBS | HEIGHT: 65 IN

## 2018-06-06 DIAGNOSIS — E89.0 POSTSURGICAL HYPOTHYROIDISM: ICD-10-CM

## 2018-06-06 DIAGNOSIS — E83.51 HYPOCALCEMIA: ICD-10-CM

## 2018-06-06 DIAGNOSIS — E89.0 POSTSURGICAL HYPOTHYROIDISM: Primary | ICD-10-CM

## 2018-06-06 LAB
CALCIUM SERPL-MCNC: 8.1 MG/DL (ref 8.5–10.1)
CREAT SERPL-MCNC: 0.77 MG/DL (ref 0.52–1.04)
GFR SERPL CREATININE-BSD FRML MDRD: 80 ML/MIN/1.7M2
PHOSPHATE SERPL-MCNC: 4 MG/DL (ref 2.5–4.5)
PTH-INTACT SERPL-MCNC: 7 PG/ML (ref 18–80)
T4 FREE SERPL-MCNC: 1.1 NG/DL (ref 0.76–1.46)
TSH SERPL DL<=0.005 MIU/L-ACNC: 1.16 MU/L (ref 0.4–4)

## 2018-06-06 ASSESSMENT — PAIN SCALES - GENERAL: PAINLEVEL: NO PAIN (0)

## 2018-06-06 NOTE — MR AVS SNAPSHOT
After Visit Summary   6/6/2018    Delio Agrawal    MRN: [id number]           Patient Information     Date Of Birth          1970        Visit Information        Provider Department      6/6/2018 2:30 PM Birdie Proctor MD [facility]        Today's Diagnoses     Postsurgical hypothyroidism    -  1    Hypocalcemia           Follow-ups after your visit        Follow-up notes from your care team     Return in about 4 months (around 10/6/2018).      Your next 10 appointments already scheduled     Jun 06, 2018  2:30 PM CDT   (Arrive by 2:15 PM)   RETURN ENDOCRINE with Birdie Proctor MD   [facility] ([place] [facility])    [address] [place] [address] [phone]            Oct 15, 2018  3:40 PM CDT   (Arrive by 3:25 PM)   RETURN ENDOCRINE with Birdie Proctor MD   [facility] ([place] [facility])    [address] [place] [address] [phone]              Future tests that were ordered for you today     Open Future Orders        Priority Expected Expires Ordered    Parathyroid Hormone Intact Routine  6/6/2019 6/6/2018    T4 free Routine  6/6/2019 6/6/2018    TSH Routine  6/6/2019 6/6/2018    Calcium Routine  6/6/2019 6/6/2018    Phosphorus Routine  6/6/2019 6/6/2018    Creatinine Routine  6/6/2019 6/6/2018            Who to contact     Please call your clinic at [phone] to:    Ask questions about your health    Make or cancel appointments    Discuss your medicines    Learn about your test results    Speak to your doctor            Additional Information About Your Visit        MyChart Information     [employer] gives you secure access to your electronic health record. If you see a primary care provider, you can also send messages to your care team and make appointments. If you have questions, please call your primary care clinic.  If you do not have a primary  "care provider, please call [phone] and they will assist you.      [employer] is an electronic gateway that provides easy, online access to your medical records. With [employer], you can request a clinic appointment, read your test results, renew a prescription or communicate with your care team.     To access your existing account, please contact your [facility] or call [phone] for assistance.        Care EveryWhere ID     This is your Care EveryWhere ID. This could be used by other organizations to access your [facility] medical records  [id number]        Your Vitals Were     Pulse Height BMI (Body Mass Index)             75 1.651 m (5' 5\") 26.24 kg/m2          Blood Pressure from Last 3 Encounters:   06/06/18 111/69   04/30/18 106/66   12/29/17 106/71    Weight from Last 3 Encounters:   06/06/18 71.5 kg (157 lb 11.2 oz)   04/30/18 72 kg (158 lb 12.8 oz)   12/29/17 73.6 kg (162 lb 3.2 oz)               Primary Care Provider Office Phone # Fax #    Cassandra Day CHANDRAKANT [facility] [phone] [address] [facility] [address]        Equal Access to Services     ANASTASIIA RYAN AH: Hadii aad ku hadasho Soomaali, waaxda luqadaha, qaybta kaalmada adeegyada, waxay idiin hayklevern will roland laabhishek . So [facility] [phone].    ATENCIÓN: Si habla español, tiene a clemente disposición servicios gratuitos de asistencia lingüística. Llame al [phone].    We comply with applicable federal civil rights laws and [place] laws. We do not discriminate on the basis of race, color, national origin, age, disability, sex, sexual orientation, or gender identity.            Thank you!     Thank you for choosing [facility]  for your care. Our goal is always to provide you with excellent care. Hearing back from our patients is one way we can continue to improve our services. Please take a few minutes to complete the written survey that you may receive in the mail after your " visit with us. Thank you!             Your Updated Medication List - Protect others around you: Learn how to safely use, store and throw away your medicines at www.disposemymeds.org.          This list is accurate as of 6/6/18  2:20 PM.  Always use your most recent med list.                   Brand Name Dispense Instructions for use Diagnosis    calcitRIOL 0.5 MCG capsule    ROCALTROL    90 capsule    Take 1 capsule (0.5 mcg) by mouth daily    Hypocalcemia, Postsurgical hypothyroidism       calcium carbonate 500 MG chewable tablet    TUMS    150 tablet    Take 2 tablets (1,000 mg) by mouth 3 times daily    Goiter       levothyroxine 112 MCG tablet    SYNTHROID/LEVOTHROID    90 tablet    Take 1 tablet (112 mcg) by mouth daily    Hypocalcemia, Postsurgical hypothyroidism       senna-docusate 8.6-50 MG per tablet    SENOKOT-S;PERICOLACE    30 tablet    Take 1-2 tablets by mouth 2 times daily Take while on oral narcotics to prevent or treat constipation.    Goiter

## 2018-06-06 NOTE — PROGRESS NOTES
TELEPHONE VISIT    Attending Assessment/Plan :     1.  Hypocalcemia, presumably postsurgical hypoparathyroidism,  Current status unnown.  Labs today, following appt, continue to show post surgical hypoparathyroidism.  Continue present regimen.     2.  Post surgical hypothyroidism.   Unstable due to changing dose. She is currently on  112 mcg/day.    Labs today, following the garland, are at target with normal TSH      Birdie Proctor MD      Cc/ HISTORY OF PRESENT ILLNESS Delio returns for followup of postsurgical hypothyroidism, postoperative hypocalcemia.      On 12/1/17 she was treated with total thyroidectomy removing 139 gram multinodular goiter.  There was a small fragment of parathyroid tissue on the left lobectomy specimen.  Postoperative PTH was < 3 on 12/2, 12/1, 1/5/18 and < 7 on 3/6/18.  Calcium was 8.6 on 3/6/18.  We had semi-urgent follow up telephone visit 4/20/18 after she had been off calcium and had low calcium.    We made the following med changes at that time including to change to calcitriol 0.5 mcg/day and to reduce LT4 from 125 to 112 mcg/day. Today she reports she is taking:  LT4 112 mcg/day  Calcitriol 0.5 mcg/day  Calcium carbonate 1000 mg tid. If she takes it bid she has a little symptoms.      REVIEW OF SYSTEMS  Weight loss  Appetite is goo  Energy is OK while on calcium  Sharp pain right neck sometimes.  Like needle poke - she last flelt it yesterday  Cardiac: negative  Respiratory: negative  GI: negative  A litlte numbness if she takes calcium bid  No muscle cramps.     Past Medical History  Past Medical History:   Diagnosis Date     Goiter      Subclinical hyperthyroidism      Medications  Current Outpatient Prescriptions   Medication Sig Dispense Refill     calcitRIOL (ROCALTROL) 0.5 MCG capsule Take 1 capsule (0.5 mcg) by mouth daily 90 capsule 3     calcium carbonate (TUMS) 500 MG chewable tablet Take 2 tablets (1,000 mg) by mouth 3 times daily 150 tablet 1     levothyroxine  "(SYNTHROID/LEVOTHROID) 112 MCG tablet Take 1 tablet (112 mcg) by mouth daily 90 tablet 3     senna-docusate (SENOKOT-S;PERICOLACE) 8.6-50 MG per tablet Take 1-2 tablets by mouth 2 times daily Take while on oral narcotics to prevent or treat constipation. 30 tablet 0      Allergies  No Known Allergies    Family History  family history includes Thyroid Disease in her maternal aunt, sister, sister, and another family member.   Thyroid surgery in sister and aunt    Social History    Social History   Substance Use Topics     Smoking status: Never Smoker     Smokeless tobacco: Never Used     Alcohol use No     From [place].  is [place].  He works in the OR.     EXAM:  GENERAL middle aged woman inNAD  /69  Pulse 75  Ht 1.651 m (5' 5\")  Wt 71.5 kg (157 lb 11.2 oz)  BMI 26.24 kg/m2  SKIN: normal color, temperature, texture   HEENT: PER,  no scleral icterus, eyelid retraction, stare, lid lag, proptosis or conjunctival injection.     NECK: supple.  Healed surgical scar   LUNGS: clear to auscultation bilaterally.   CARDIAC: RRR, S1, S2 without murmurs, rubs or gallops.    NEURO: Alert, responds appropriately to questions, moves all extremities,  no tremor of the outstretched hand    DATA REVIEW    Results for ABDIEL ROSS (MRN [id number]) as of 6/7/2018 14:37   Ref. Range 4/30/2018 16:46 6/6/2018 14:48   Potassium Latest Ref Range: 3.4 - 5.3 mmol/L 3.6    Chloride Latest Ref Range: 94 - 109 mmol/L 103    Carbon Dioxide Latest Ref Range: 20 - 32 mmol/L 32    Urea Nitrogen Latest Ref Range: 7 - 30 mg/dL 11    Creatinine Latest Ref Range: 0.52 - 1.04 mg/dL 0.75 0.77   GFR Estimate Latest Ref Range: >60 mL/min/1.7m2 82 80   GFR Estimate If Black Latest Ref Range: >60 mL/min/1.7m2 >90 >90   Calcium Latest Ref Range: 8.5 - 10.1 mg/dL 8.5 8.1 (L)   Anion Gap Latest Ref Range: 3 - 14 mmol/L 5    Phosphorus Latest Ref Range: 2.5 - 4.5 mg/dL  4.0   Albumin Latest Ref Range: 3.4 - 5.0 g/dL 4.1    Protein Total Latest " Ref Range: 6.8 - 8.8 g/dL 8.2    Bilirubin Total Latest Ref Range: 0.2 - 1.3 mg/dL 0.3    Alkaline Phosphatase Latest Ref Range: 40 - 150 U/L 57    ALT Latest Ref Range: 0 - 50 U/L 21    AST Latest Ref Range: 0 - 45 U/L 21    Iodine Serum Latest Ref Range: 40 - 92 ng/mL 128 (H)    T4 Free Latest Ref Range: 0.76 - 1.46 ng/dL  1.10   TSH Latest Ref Range: 0.40 - 4.00 mU/L  1.16   Vitamin B12 Latest Ref Range: 193 - 986 pg/mL 656    Vitamin D Deficiency screening Latest Ref Range: 20 - 75 ug/L 34    Glucose Latest Ref Range: 70 - 99 mg/dL 86    WBC Latest Ref Range: 4.0 - 11.0 10e9/L 9.5    Hemoglobin Latest Ref Range: 11.7 - 15.7 g/dL 13.8    Hematocrit Latest Ref Range: 35.0 - 47.0 % 41.8    Platelet Count Latest Ref Range: 150 - 450 10e9/L 182    RBC Count Latest Ref Range: 3.8 - 5.2 10e12/L 4.44    MCV Latest Ref Range: 78 - 100 fl 94    MCH Latest Ref Range: 26.5 - 33.0 pg 31.1    MCHC Latest Ref Range: 31.5 - 36.5 g/dL 33.0    RDW Latest Ref Range: 10.0 - 15.0 % 12.4    Parathyroid Hormone Intact Latest Ref Range: 18 - 80 pg/mL  7 (L)

## 2018-06-06 NOTE — LETTER
6/6/2018       RE: Delio Agrawal  [address]  [place] [address]     Dear Colleague,    Thank you for referring your patient, Delio Agrawal, to the [facility] END[facility] at [facility]. Please see a copy of my visit note below.    TELEPHONE VISIT    Attending Assessment/Plan :     1.  Hypocalcemia, presumably postsurgical hypoparathyroidism,  Current status unnown.  Labs today, following appt, continue to show post surgical hypoparathyroidism.  Continue present regimen.     2.  Post surgical hypothyroidism.   Unstable due to changing dose. She is currently on  112 mcg/day.    Labs today, following the garland, are at target with normal TSH      Birdie Proctor MD      Cc/ HISTORY OF PRESENT ILLNESS Delio returns for followup of postsurgical hypothyroidism, postoperative hypocalcemia.      On 12/1/17 she was treated with total thyroidectomy removing 139 gram multinodular goiter.  There was a small fragment of parathyroid tissue on the left lobectomy specimen.  Postoperative PTH was < 3 on 12/2, 12/1, 1/5/18 and < 7 on 3/6/18.  Calcium was 8.6 on 3/6/18.  We had semi-urgent follow up telephone visit 4/20/18 after she had been off calcium and had low calcium.    We made the following med changes at that time including to change to calcitriol 0.5 mcg/day and to reduce LT4 from 125 to 112 mcg/day. Today she reports she is taking:  LT4 112 mcg/day  Calcitriol 0.5 mcg/day  Calcium carbonate 1000 mg tid. If she takes it bid she has a little symptoms.      REVIEW OF SYSTEMS  Weight loss  Appetite is goo  Energy is OK while on calcium  Sharp pain right neck sometimes.  Like needle poke - she last flelt it yesterday  Cardiac: negative  Respiratory: negative  GI: negative  A litlte numbness if she takes calcium bid  No muscle cramps.     Past Medical History  Past Medical History:   Diagnosis Date     Goiter      Subclinical hyperthyroidism      Medications  Current Outpatient  "Prescriptions   Medication Sig Dispense Refill     calcitRIOL (ROCALTROL) 0.5 MCG capsule Take 1 capsule (0.5 mcg) by mouth daily 90 capsule 3     calcium carbonate (TUMS) 500 MG chewable tablet Take 2 tablets (1,000 mg) by mouth 3 times daily 150 tablet 1     levothyroxine (SYNTHROID/LEVOTHROID) 112 MCG tablet Take 1 tablet (112 mcg) by mouth daily 90 tablet 3     senna-docusate (SENOKOT-S;PERICOLACE) 8.6-50 MG per tablet Take 1-2 tablets by mouth 2 times daily Take while on oral narcotics to prevent or treat constipation. 30 tablet 0      Allergies  No Known Allergies    Family History  family history includes Thyroid Disease in her maternal aunt, sister, sister, and another family member.   Thyroid surgery in sister and aunt    Social History    Social History   Substance Use Topics     Smoking status: Never Smoker     Smokeless tobacco: Never Used     Alcohol use No     From [place].  is [place].  He works in the OR.     EXAM:  GENERAL middle aged woman inNAD  /69  Pulse 75  Ht 1.651 m (5' 5\")  Wt 71.5 kg (157 lb 11.2 oz)  BMI 26.24 kg/m2  SKIN: normal color, temperature, texture   HEENT: PER,  no scleral icterus, eyelid retraction, stare, lid lag, proptosis or conjunctival injection.     NECK: supple.  Healed surgical scar   LUNGS: clear to auscultation bilaterally.   CARDIAC: RRR, S1, S2 without murmurs, rubs or gallops.    NEURO: Alert, responds appropriately to questions, moves all extremities,  no tremor of the outstretched hand    DATA REVIEW    Results for ABDIEL ROSS (MRN [id number]) as of 6/7/2018 14:37   Ref. Range 4/30/2018 16:46 6/6/2018 14:48   Potassium Latest Ref Range: 3.4 - 5.3 mmol/L 3.6    Chloride Latest Ref Range: 94 - 109 mmol/L 103    Carbon Dioxide Latest Ref Range: 20 - 32 mmol/L 32    Urea Nitrogen Latest Ref Range: 7 - 30 mg/dL 11    Creatinine Latest Ref Range: 0.52 - 1.04 mg/dL 0.75 0.77   GFR Estimate Latest Ref Range: >60 mL/min/1.7m2 82 80   GFR Estimate If " Black Latest Ref Range: >60 mL/min/1.7m2 >90 >90   Calcium Latest Ref Range: 8.5 - 10.1 mg/dL 8.5 8.1 (L)   Anion Gap Latest Ref Range: 3 - 14 mmol/L 5    Phosphorus Latest Ref Range: 2.5 - 4.5 mg/dL  4.0   Albumin Latest Ref Range: 3.4 - 5.0 g/dL 4.1    Protein Total Latest Ref Range: 6.8 - 8.8 g/dL 8.2    Bilirubin Total Latest Ref Range: 0.2 - 1.3 mg/dL 0.3    Alkaline Phosphatase Latest Ref Range: 40 - 150 U/L 57    ALT Latest Ref Range: 0 - 50 U/L 21    AST Latest Ref Range: 0 - 45 U/L 21    Iodine Serum Latest Ref Range: 40 - 92 ng/mL 128 (H)    T4 Free Latest Ref Range: 0.76 - 1.46 ng/dL  1.10   TSH Latest Ref Range: 0.40 - 4.00 mU/L  1.16   Vitamin B12 Latest Ref Range: 193 - 986 pg/mL 656    Vitamin D Deficiency screening Latest Ref Range: 20 - 75 ug/L 34    Glucose Latest Ref Range: 70 - 99 mg/dL 86    WBC Latest Ref Range: 4.0 - 11.0 10e9/L 9.5    Hemoglobin Latest Ref Range: 11.7 - 15.7 g/dL 13.8    Hematocrit Latest Ref Range: 35.0 - 47.0 % 41.8    Platelet Count Latest Ref Range: 150 - 450 10e9/L 182    RBC Count Latest Ref Range: 3.8 - 5.2 10e12/L 4.44    MCV Latest Ref Range: 78 - 100 fl 94    MCH Latest Ref Range: 26.5 - 33.0 pg 31.1    MCHC Latest Ref Range: 31.5 - 36.5 g/dL 33.0    RDW Latest Ref Range: 10.0 - 15.0 % 12.4    Parathyroid Hormone Intact Latest Ref Range: 18 - 80 pg/mL  7 (L)       Again, thank you for allowing me to participate in the care of your patient.      Sincerely,    Birdie Proctor MD

## 2018-07-11 ENCOUNTER — OFFICE VISIT (OUTPATIENT)
Dept: DERMATOLOGY | Facility: CLINIC | Age: 48
End: 2018-07-11
Payer: COMMERCIAL

## 2018-07-11 VITALS — OXYGEN SATURATION: 98 % | DIASTOLIC BLOOD PRESSURE: 63 MMHG | HEART RATE: 66 BPM | SYSTOLIC BLOOD PRESSURE: 95 MMHG

## 2018-07-11 DIAGNOSIS — L30.1 DYSHIDROTIC DERMATITIS: ICD-10-CM

## 2018-07-11 DIAGNOSIS — B35.3 TINEA PEDIS, UNSPECIFIED LATERALITY: ICD-10-CM

## 2018-07-11 DIAGNOSIS — L71.0 DERMATITIS, PERIORAL: ICD-10-CM

## 2018-07-11 DIAGNOSIS — B35.1 ONYCHOMYCOSIS: Primary | ICD-10-CM

## 2018-07-11 PROCEDURE — 99214 OFFICE O/P EST MOD 30 MIN: CPT | Performed by: PHYSICIAN ASSISTANT

## 2018-07-11 RX ORDER — DESONIDE 0.5 MG/G
CREAM TOPICAL
Qty: 60 G | Refills: 0 | Status: SHIPPED | OUTPATIENT
Start: 2018-07-11 | End: 2018-10-15

## 2018-07-11 RX ORDER — METRONIDAZOLE 7.5 MG/G
LOTION TOPICAL
Qty: 59 ML | Refills: 1 | Status: SHIPPED | OUTPATIENT
Start: 2018-07-11 | End: 2018-10-15

## 2018-07-11 RX ORDER — CICLOPIROX 80 MG/ML
SOLUTION TOPICAL
Qty: 6.6 ML | Refills: 1 | Status: SHIPPED | OUTPATIENT
Start: 2018-07-11 | End: 2018-10-15

## 2018-07-11 RX ORDER — FLUOCINONIDE 0.5 MG/G
CREAM TOPICAL
Qty: 120 G | Refills: 0 | Status: SHIPPED | OUTPATIENT
Start: 2018-07-11 | End: 2018-10-15

## 2018-07-11 RX ORDER — KETOCONAZOLE 20 MG/G
CREAM TOPICAL
Qty: 60 G | Refills: 2 | Status: SHIPPED | OUTPATIENT
Start: 2018-07-11 | End: 2018-10-15

## 2018-07-11 NOTE — MR AVS SNAPSHOT
After Visit Summary   7/11/2018    Delio Agrawal    MRN: [id number]           Patient Information     Date Of Birth          1970        Visit Information        Provider Department      7/11/2018 3:20 PM Sabina Conn PA-C [facility]        Today's Diagnoses     Onychomycosis    -  1    Dyshidrotic dermatitis        Tinea pedis, unspecified laterality          Care Instructions    Apply Penlac to affected nails nightly  Apply ketoconazole to affected foot BID  Apply lidex to affected foot BID     Follow up in 2-3 weeks    [facility]          Follow-ups after your visit        Your next 10 appointments already scheduled     Oct 15, 2018  3:40 PM CDT   (Arrive by 3:25 PM)   RETURN ENDOCRINE with Birdie Proctor MD   [facility] ([facility])    [address] [place] [address] [phone]              Who to contact     If you have questions or need follow up information about today's clinic visit or your schedule please contact [facility] directly at [phone].  Normal or non-critical lab and imaging results will be communicated to you by MyChart, letter or phone within 4 business days after the clinic has received the results. If you do not hear from us within 7 days, please contact the clinic through [employer] or phone. If you have a critical or abnormal lab result, we will notify you by phone as soon as possible.  Submit refill requests through [employer] or call your pharmacy and they will forward the refill request to us. Please allow 3 business days for your refill to be completed.          Additional Information About Your Visit        MyChart Information     [employer] gives you secure access to your electronic health record. If you see a primary care provider, you can also send messages to your care team and make appointments. If you have questions, please call your  primary care clinic.  If you do not have a primary care provider, please call [phone] and they will assist you.        Care EveryWhere ID     This is your Care EveryWhere ID. This could be used by other organizations to access your [facility] medical records  [id number]        Your Vitals Were     Pulse Pulse Oximetry Breastfeeding?             66 98% No          Blood Pressure from Last 3 Encounters:   07/11/18 95/63   06/06/18 111/69   04/30/18 106/66    Weight from Last 3 Encounters:   06/06/18 71.5 kg (157 lb 11.2 oz)   04/30/18 72 kg (158 lb 12.8 oz)   12/29/17 73.6 kg (162 lb 3.2 oz)              Today, you had the following     No orders found for display         Today's Medication Changes          These changes are accurate as of 7/11/18  3:30 PM.  If you have any questions, ask your nurse or doctor.               Start taking these medicines.        Dose/Directions    ciclopirox 8 % Soln   Used for:  Onychomycosis   Started by:  Sabina Conn PA-C        Apply to adjacent skin and affected nails daily.  Remove with alcohol every 7 days, then repeat.   Quantity:  6.6 mL   Refills:  1       fluocinonide 0.05 % cream   Commonly known as:  LIDEX   Used for:  Dyshidrotic dermatitis   Started by:  Sabina Conn PA-C        Apply a thin layer to foot BID. Do not use on face or body folds.   Quantity:  120 g   Refills:  0       ketoconazole 2 % cream   Commonly known as:  NIZORAL   Used for:  Tinea pedis, unspecified laterality   Started by:  Sabina Conn PA-C        Apply to foot BID   Quantity:  60 g   Refills:  2            Where to get your medicines      These medications were sent to [facility] [place], [place] - [address]  [address] [address]     Phone:  [phone]     ciclopirox 8 % Soln    fluocinonide 0.05 % cream    ketoconazole 2 % cream                Primary Care Provider Office Phone # Fax #    Cassandra Apodaca,  APRN [phone]       [address] [facility] [address]        Equal Access to Services     ANASTASIIA RYAN : Hadii aad ku hadfly Olivares, gonzales osmintroy, kumar kabhavyada sourav, asiya renaebrenda kidd. So [facility] [phone].    ATENCIÓN: Si habla español, tiene a clemente disposición servicios gratuitos de asistencia lingüística. Llame al [phone].    We comply with applicable federal civil rights laws and [place] laws. We do not discriminate on the basis of race, color, national origin, age, disability, sex, sexual orientation, or gender identity.            Thank you!     Thank you for choosing [facility]  for your care. Our goal is always to provide you with excellent care. Hearing back from our patients is one way we can continue to improve our services. Please take a few minutes to complete the written survey that you may receive in the mail after your visit with us. Thank you!             Your Updated Medication List - Protect others around you: Learn how to safely use, store and throw away your medicines at www.disposemymeds.org.          This list is accurate as of 7/11/18  3:30 PM.  Always use your most recent med list.                   Brand Name Dispense Instructions for use Diagnosis    calcitRIOL 0.5 MCG capsule    ROCALTROL    90 capsule    Take 1 capsule (0.5 mcg) by mouth daily    Hypocalcemia, Postsurgical hypothyroidism       calcium carbonate 500 MG chewable tablet    TUMS    150 tablet    Take 2 tablets (1,000 mg) by mouth 3 times daily    Goiter       ciclopirox 8 % Soln     6.6 mL    Apply to adjacent skin and affected nails daily.  Remove with alcohol every 7 days, then repeat.    Onychomycosis       fluocinonide 0.05 % cream    LIDEX    120 g    Apply a thin layer to foot BID. Do not use on face or body folds.    Dyshidrotic dermatitis       ketoconazole 2 % cream    NIZORAL    60 g    Apply to foot BID    Tinea pedis,  unspecified laterality       levothyroxine 112 MCG tablet    SYNTHROID/LEVOTHROID    90 tablet    Take 1 tablet (112 mcg) by mouth daily    Hypocalcemia, Postsurgical hypothyroidism       senna-docusate 8.6-50 MG per tablet    SENOKOT-S;PERICOLACE    30 tablet    Take 1-2 tablets by mouth 2 times daily Take while on oral narcotics to prevent or treat constipation.    Goiter

## 2018-07-11 NOTE — PATIENT INSTRUCTIONS
Apply Penlac to affected nails nightly  Apply ketoconazole to affected foot BID  Apply lidex to affected foot BID     Discussed side effects of topical steroids including but not limited to atrophy (skin thinning), striae (stretch marks) telangiectasias, steroid acne, and others. Do not apply to normal skin. Do not apply to discolored skin that does not have rash present. Educated patient on post inflammatory hyperpigmentation.       Follow up in 2-3 weeks    [email or web address]

## 2018-07-11 NOTE — PROGRESS NOTES
HPI:  Delio Agrawal is a 48 year old year old female patient here today for rash on foot   Duration: 5 years  Symptoms:  Itching, worse in winter, gets little blisters on feet.      Previous treatments: antifungal oil with complete resolution but rash keeps returing     Alleviating/aggravating factors: none    Associated symptoms: denies excess sweating of feet  Additional findings:toenails are turning yellow on left foot. No tx tried.   Patient has no other skin complaints today.  Remainder of the HPI, Meds, PMH, Allergies, FH, and SH was reviewed in chart.      Past Medical History:   Diagnosis Date     Goiter      Subclinical hyperthyroidism        Past Surgical History:   Procedure Laterality Date     THYROIDECTOMY N/A 2017    Procedure: THYROIDECTOMY;  Total Thyroidectomy of substernal goiter;  Surgeon: Keyla Griffin MD;  Location:  OR        Family History   Problem Relation Age of Onset     Thyroid Disease Sister      surgery x 2     Thyroid Disease Maternal Aunt      surgery complicated by loss of voice.   days later     Thyroid Disease Other      Thyroid Disease Sister        Social History     Social History     Marital status:      Spouse name: N/A     Number of children: N/A     Years of education: N/A     Occupational History     Not on file.     Social History Main Topics     Smoking status: Never Smoker     Smokeless tobacco: Never Used     Alcohol use No     Drug use: No     Sexual activity: Yes     Partners: Male     Birth control/ protection: None     Other Topics Concern     Parent/Sibling W/ Cabg, Mi Or Angioplasty Before 65f 55m? No     Social History Narrative    How much exercise per week? Walking a lot during the week.    How much calcium per day? Organic foods only       How much caffeine per day? 0    How much vitamin D per day? Organic foods only     Do you/your family wear seatbelts?  Yes    Do you/your family use safety helmets? No    Do you/your family use  sunscreen? No    Do you/your family keep firearms in the home? No    Do you/your family have a smoke detector(s)? Yes        Do you feel safe in your home? Yes    Has anyone ever touched you in an unwanted manner? No                    Outpatient Encounter Prescriptions as of 7/11/2018   Medication Sig Dispense Refill     calcitRIOL (ROCALTROL) 0.5 MCG capsule Take 1 capsule (0.5 mcg) by mouth daily 90 capsule 3     calcium carbonate (TUMS) 500 MG chewable tablet Take 2 tablets (1,000 mg) by mouth 3 times daily 150 tablet 1     ciclopirox 8 % SOLN Apply to adjacent skin and affected nails daily.  Remove with alcohol every 7 days, then repeat. 6.6 mL 1     desonide (DESOWEN) 0.05 % cream Apply a thin layer to affected area on face BID x 5-7 days. Tapering with improvement. Restarting as needed. 60 g 0     fluocinonide (LIDEX) 0.05 % cream Apply a thin layer to foot BID. Do not use on face or body folds. 120 g 0     ketoconazole (NIZORAL) 2 % cream Apply to foot BID 60 g 2     levothyroxine (SYNTHROID/LEVOTHROID) 112 MCG tablet Take 1 tablet (112 mcg) by mouth daily 90 tablet 3     metroNIDAZOLE 0.75 % LOTN Apply to affected face BID x 1-2 months 59 mL 1     senna-docusate (SENOKOT-S;PERICOLACE) 8.6-50 MG per tablet Take 1-2 tablets by mouth 2 times daily Take while on oral narcotics to prevent or treat constipation. 30 tablet 0     No facility-administered encounter medications on file as of 7/11/2018.        Review Of Systems:  Skin: As above  Eyes: negative  Ears/Nose/Throat: negative  Respiratory: No shortness of breath, dyspnea on exertion, cough, or hemoptysis  Cardiovascular: negative  Gastrointestinal: negative  Genitourinary: negative  Musculoskeletal: negative  Neurologic: negative  Psychiatric: negative  Hematologic/Lymphatic/Immunologic: negative  Endocrine: negative      Objective:     BP 95/63  Pulse 66  SpO2 98%  Breastfeeding? No  Eyes: Conjunctivae/lids: Normal   ENT: Lips:  Normal  MSK:  Normal  Cardiovascular: Peripheral edema none  Pulm: Breathing Normal  Neuro/Psych: Orientation: Normal; Mood/Affect: Normal, NAD, WDWN  Following areas examined: face, left foot, left toenails, right foot  Steele skin type:lll   Findings:    1) pink scaly patches on plantar foot. Pink papules on left great toe  2) yellow thickened nails on left 1-2 toes  3) few red papules perioral    Assessment and Plan:  1) dyshidrotic eczema +/- tinea pedis with pruritis  Apply ketoconazole to affected foot BID  Apply lidex to affected foot BID     Discussed side effects of topical steroids including but not limited to atrophy (skin thinning), striae (stretch marks) telangiectasias, steroid acne, and others. Do not apply to normal skin. Do not apply to discolored skin that does not have rash present. Educated patient on post inflammatory hyperpigmentation.     2) onychomycosis of left 1-2nd toenails  Pt defers cx.  Apply Penlac to affected nails nightly  Discussed treatment options with OTC products including daily application of tea tree oil, Vicks vapor rub, and/or apple cider vinegar soaks. Disc oral agents and liver function tests required at baseline, 6 weeks after treatment begins, and then once treatment ends. Prescription strength topicals are available, can use up to 48 weeks. Fingernails take 3-6 months to regrow completely, and toenails up to 12-18 months.     3) perioral dermatitis    Begin metronidazole lotion BID x 1-2 months  Begin desonide BID x 5-7 days tapering with improvement.       Follow up in 2-3 weeks.

## 2018-07-11 NOTE — LETTER
2018         RE: Delio Agrawal  [address]  [facility] [address]        Dear Colleague,    Thank you for referring your patient, Delio Agrawal, [place] [facility] Please see a copy of my visit note below.    HPI:  Delio Agrawal is a 48 year old year old female patient here today for rash on foot   Duration: 5 years  Symptoms:  Itching, worse in winter, gets little blisters on feet.      Previous treatments: antifungal oil with complete resolution but rash keeps returing     Alleviating/aggravating factors: none    Associated symptoms: denies excess sweating of feet  Additional findings:toenails are turning yellow on left foot. No tx tried.   Patient has no other skin complaints today.  Remainder of the HPI, Meds, PMH, Allergies, FH, and SH was reviewed in chart.      Past Medical History:   Diagnosis Date     Goiter      Subclinical hyperthyroidism        Past Surgical History:   Procedure Laterality Date     THYROIDECTOMY N/A 2017    Procedure: THYROIDECTOMY;  Total Thyroidectomy of substernal goiter;  Surgeon: Keyla Griffin MD;  Location:  OR        Family History   Problem Relation Age of Onset     Thyroid Disease Sister      surgery x 2     Thyroid Disease Maternal Aunt      surgery complicated by loss of voice.   days later     Thyroid Disease Other      Thyroid Disease Sister        Social History     Social History     Marital status:      Spouse name: N/A     Number of children: N/A     Years of education: N/A     Occupational History     Not on file.     Social History Main Topics     Smoking status: Never Smoker     Smokeless tobacco: Never Used     Alcohol use No     Drug use: No     Sexual activity: Yes     Partners: Male     Birth control/ protection: None     Other Topics Concern     Parent/Sibling W/ Cabg, Mi Or Angioplasty Before 65f 55m? No     Social History Narrative    How much exercise per week? Walking a lot during the week.     How much calcium per day? Organic foods only       How much caffeine per day? 0    How much vitamin D per day? Organic foods only     Do you/your family wear seatbelts?  Yes    Do you/your family use safety helmets? No    Do you/your family use sunscreen? No    Do you/your family keep firearms in the home? No    Do you/your family have a smoke detector(s)? Yes        Do you feel safe in your home? Yes    Has anyone ever touched you in an unwanted manner? No                    Outpatient Encounter Prescriptions as of 7/11/2018   Medication Sig Dispense Refill     calcitRIOL (ROCALTROL) 0.5 MCG capsule Take 1 capsule (0.5 mcg) by mouth daily 90 capsule 3     calcium carbonate (TUMS) 500 MG chewable tablet Take 2 tablets (1,000 mg) by mouth 3 times daily 150 tablet 1     ciclopirox 8 % SOLN Apply to adjacent skin and affected nails daily.  Remove with alcohol every 7 days, then repeat. 6.6 mL 1     desonide (DESOWEN) 0.05 % cream Apply a thin layer to affected area on face BID x 5-7 days. Tapering with improvement. Restarting as needed. 60 g 0     fluocinonide (LIDEX) 0.05 % cream Apply a thin layer to foot BID. Do not use on face or body folds. 120 g 0     ketoconazole (NIZORAL) 2 % cream Apply to foot BID 60 g 2     levothyroxine (SYNTHROID/LEVOTHROID) 112 MCG tablet Take 1 tablet (112 mcg) by mouth daily 90 tablet 3     metroNIDAZOLE 0.75 % LOTN Apply to affected face BID x 1-2 months 59 mL 1     senna-docusate (SENOKOT-S;PERICOLACE) 8.6-50 MG per tablet Take 1-2 tablets by mouth 2 times daily Take while on oral narcotics to prevent or treat constipation. 30 tablet 0     No facility-administered encounter medications on file as of 7/11/2018.        Review Of Systems:  Skin: As above  Eyes: negative  Ears/Nose/Throat: negative  Respiratory: No shortness of breath, dyspnea on exertion, cough, or hemoptysis  Cardiovascular: negative  Gastrointestinal: negative  Genitourinary: negative  Musculoskeletal:  negative  Neurologic: negative  Psychiatric: negative  Hematologic/Lymphatic/Immunologic: negative  Endocrine: negative      Objective:     BP 95/63  Pulse 66  SpO2 98%  Breastfeeding? No  Eyes: Conjunctivae/lids: Normal   ENT: Lips:  Normal  MSK: Normal  Cardiovascular: Peripheral edema none  Pulm: Breathing Normal  Neuro/Psych: Orientation: Normal; Mood/Affect: Normal, NAD, WDWN  Following areas examined: face, left foot, left toenails, right foot  Steele skin type:lll   Findings:    1) pink scaly patches on plantar foot. Pink papules on left great toe  2) yellow thickened nails on left 1-2 toes  3) few red papules perioral    Assessment and Plan:  1) dyshidrotic eczema +/- tinea pedis with pruritis  Apply ketoconazole to affected foot BID  Apply lidex to affected foot BID     Discussed side effects of topical steroids including but not limited to atrophy (skin thinning), striae (stretch marks) telangiectasias, steroid acne, and others. Do not apply to normal skin. Do not apply to discolored skin that does not have rash present. Educated patient on post inflammatory hyperpigmentation.     2) onychomycosis of left 1-2nd toenails  Pt defers cx.  Apply Penlac to affected nails nightly  Discussed treatment options with OTC products including daily application of tea tree oil, Vicks vapor rub, and/or apple cider vinegar soaks. Disc oral agents and liver function tests required at baseline, 6 weeks after treatment begins, and then once treatment ends. Prescription strength topicals are available, can use up to 48 weeks. Fingernails take 3-6 months to regrow completely, and toenails up to 12-18 months.     3) perioral dermatitis    Begin metronidazole lotion BID x 1-2 months  Begin desonide BID x 5-7 days tapering with improvement.       Follow up in 2-3 weeks.       Again, thank you for allowing me to participate in the care of your patient.        Sincerely,        Sabina Conn PA-C

## 2018-09-19 ENCOUNTER — OFFICE VISIT (OUTPATIENT)
Dept: URGENT CARE | Facility: URGENT CARE | Age: 48
End: 2018-09-19
Payer: COMMERCIAL

## 2018-09-19 VITALS
OXYGEN SATURATION: 100 % | HEART RATE: 64 BPM | HEIGHT: 61 IN | SYSTOLIC BLOOD PRESSURE: 104 MMHG | WEIGHT: 152.8 LBS | TEMPERATURE: 98.3 F | BODY MASS INDEX: 28.85 KG/M2 | DIASTOLIC BLOOD PRESSURE: 68 MMHG

## 2018-09-19 DIAGNOSIS — M54.2 NECK PAIN: Primary | ICD-10-CM

## 2018-09-19 PROCEDURE — 99213 OFFICE O/P EST LOW 20 MIN: CPT | Performed by: PHYSICIAN ASSISTANT

## 2018-09-19 RX ORDER — METHYLPREDNISOLONE 4 MG
TABLET, DOSE PACK ORAL
Qty: 21 TABLET | Refills: 0 | Status: SHIPPED | OUTPATIENT
Start: 2018-09-19 | End: 2018-10-15

## 2018-09-19 RX ORDER — CYCLOBENZAPRINE HCL 5 MG
5 TABLET ORAL 3 TIMES DAILY PRN
Qty: 15 TABLET | Refills: 0 | Status: SHIPPED | OUTPATIENT
Start: 2018-09-19 | End: 2018-09-20

## 2018-09-19 NOTE — MR AVS SNAPSHOT
After Visit Summary   9/19/2018    Delio Agrawal    MRN: [id number]           Patient Information     Date Of Birth          1970        Visit Information        Provider Department      9/19/2018 5:15 PM Kari Argueta PA-C [facility]        Today's Diagnoses     Neck pain    -  1       Follow-ups after your visit        Your next 10 appointments already scheduled     Oct 15, 2018  3:40 PM CDT   (Arrive by 3:25 PM)   RETURN ENDOCRINE with Birdie Proctor MD   [facility] ([facility])    [address] [place] [id number] [phone]              Who to contact     If you have questions or need follow up information about today's clinic visit or your schedule please contact [facility] directly at [phone].  Normal or non-critical lab and imaging results will be communicated to you by MyChart, letter or phone within 4 business days after the clinic has received the results. If you do not hear from us within 7 days, please contact the clinic through MyChart or phone. If you have a critical or abnormal lab result, we will notify you by phone as soon as possible.  Submit refill requests through [employer] or call your pharmacy and they will forward the refill request to us. Please allow 3 business days for your refill to be completed.          Additional Information About Your Visit        MyChart Information     [employer] gives you secure access to your electronic health record. If you see a primary care provider, you can also send messages to your care team and make appointments. If you have questions, please call your primary care clinic.  If you do not have a primary care provider, please call [phone] and they will assist you.        Care EveryWhere ID     This is your Care EveryWhere ID. This could be used by other organizations to access your [facility]  "records  [id number]        Your Vitals Were     Pulse Temperature Height Last Period Pulse Oximetry BMI (Body Mass Index)    64 98.3  F (36.8  C) (Oral) 5' 1.02\" (1.55 m) 09/09/2018 (Exact Date) 100% 28.85 kg/m2       Blood Pressure from Last 3 Encounters:   09/20/18 106/66   09/19/18 104/68   07/11/18 95/63    Weight from Last 3 Encounters:   09/20/18 147 lb 11.2 oz (67 kg)   09/19/18 152 lb 12.8 oz (69.3 kg)   06/06/18 157 lb 11.2 oz (71.5 kg)              Today, you had the following     No orders found for display         Today's Medication Changes          These changes are accurate as of 9/19/18 11:59 PM.  If you have any questions, ask your nurse or doctor.               Start taking these medicines.        Dose/Directions    cyclobenzaprine 5 MG tablet   Commonly known as:  FLEXERIL   Used for:  Neck pain   Started by:  Kari Argueta PA-C        Dose:  5 mg   Take 1 tablet (5 mg) by mouth 3 times daily as needed for muscle spasms   Quantity:  15 tablet   Refills:  0       methylPREDNISolone 4 MG tablet   Commonly known as:  MEDROL DOSEPAK   Used for:  Neck pain   Started by:  Kari Argueta PA-C        Follow package instructions   Quantity:  21 tablet   Refills:  0            Where to get your medicines      These medications were sent to [facility] - [place], [place] - [address]  [address], [place] [address]     Phone:  [phone]     cyclobenzaprine 5 MG tablet    methylPREDNISolone 4 MG tablet                Primary Care Provider Office Phone # Fax #    Cassandra CHANDRAKANT Zarco [facility] [phone]       [address] [place] [address]        Equal Access to Services     [facility] SHELBY AH: Sepideh silva Soadelina, waaxda luqadaha, qaybta kaalmada adeegyada, asiya kidd. [facility] [phone].    ATENCIÓN: Si habla español, tiene a clemente disposición servicios gratuitos de asistencia lingüística. Llame al " [phone].    We comply with applicable federal civil rights laws and [place] laws. We do not discriminate on the basis of race, color, national origin, age, disability, sex, sexual orientation, or gender identity.            Thank you!     Thank you for choosing [facility]  for your care. Our goal is always to provide you with excellent care. Hearing back from our patients is one way we can continue to improve our services. Please take a few minutes to complete the written survey that you may receive in the mail after your visit with us. Thank you!             Your Updated Medication List - Protect others around you: Learn how to safely use, store and throw away your medicines at www.disposemymeds.org.          This list is accurate as of 9/19/18 11:59 PM.  Always use your most recent med list.                   Brand Name Dispense Instructions for use Diagnosis    ADVIL PO           calcitRIOL 0.5 MCG capsule    ROCALTROL    90 capsule    Take 1 capsule (0.5 mcg) by mouth daily    Hypocalcemia, Postsurgical hypothyroidism       calcium carbonate 500 MG chewable tablet    TUMS    150 tablet    Take 2 tablets (1,000 mg) by mouth 3 times daily    Goiter       ciclopirox 8 % Soln     6.6 mL    Apply to adjacent skin and affected nails daily.  Remove with alcohol every 7 days, then repeat.    Onychomycosis       cyclobenzaprine 5 MG tablet    FLEXERIL    15 tablet    Take 1 tablet (5 mg) by mouth 3 times daily as needed for muscle spasms    Neck pain       desonide 0.05 % cream    DESOWEN    60 g    Apply a thin layer to affected area on face BID x 5-7 days. Tapering with improvement. Restarting as needed.    Dermatitis, perioral       fluocinonide 0.05 % cream    LIDEX    120 g    Apply a thin layer to foot BID. Do not use on face or body folds.    Dyshidrotic dermatitis       ketoconazole 2 % cream    NIZORAL    60 g    Apply to foot BID    Tinea pedis, unspecified laterality        levothyroxine 112 MCG tablet    SYNTHROID/LEVOTHROID    90 tablet    Take 1 tablet (112 mcg) by mouth daily    Hypocalcemia, Postsurgical hypothyroidism       methylPREDNISolone 4 MG tablet    MEDROL DOSEPAK    21 tablet    Follow package instructions    Neck pain       metroNIDAZOLE 0.75 % Lotn     59 mL    Apply to affected face BID x 1-2 months    Dermatitis, perioral       senna-docusate 8.6-50 MG per tablet    SENOKOT-S;PERICOLACE    30 tablet    Take 1-2 tablets by mouth 2 times daily Take while on oral narcotics to prevent or treat constipation.    Goiter

## 2018-09-20 ENCOUNTER — HOSPITAL ENCOUNTER (EMERGENCY)
Facility: CLINIC | Age: 48
Discharge: HOME | End: 2018-09-20
Attending: EMERGENCY MEDICINE | Admitting: EMERGENCY MEDICINE
Payer: COMMERCIAL

## 2018-09-20 VITALS
TEMPERATURE: 98.3 F | WEIGHT: 147.7 LBS | BODY MASS INDEX: 27.89 KG/M2 | RESPIRATION RATE: 18 BRPM | HEART RATE: 85 BPM | DIASTOLIC BLOOD PRESSURE: 66 MMHG | OXYGEN SATURATION: 97 % | HEIGHT: 61 IN | SYSTOLIC BLOOD PRESSURE: 106 MMHG

## 2018-09-20 DIAGNOSIS — M54.2 NECK PAIN: ICD-10-CM

## 2018-09-20 PROCEDURE — 99284 EMERGENCY DEPT VISIT MOD MDM: CPT | Mod: Z6 | Performed by: EMERGENCY MEDICINE

## 2018-09-20 PROCEDURE — 96374 THER/PROPH/DIAG INJ IV PUSH: CPT

## 2018-09-20 PROCEDURE — 25000128 H RX IP 250 OP 636: Performed by: EMERGENCY MEDICINE

## 2018-09-20 PROCEDURE — 99284 EMERGENCY DEPT VISIT MOD MDM: CPT

## 2018-09-20 PROCEDURE — 25000132 ZZH RX MED GY IP 250 OP 250 PS 637: Performed by: EMERGENCY MEDICINE

## 2018-09-20 RX ORDER — KETOROLAC TROMETHAMINE 30 MG/ML
30 INJECTION, SOLUTION INTRAMUSCULAR; INTRAVENOUS ONCE
Status: COMPLETED | OUTPATIENT
Start: 2018-09-20 | End: 2018-09-20

## 2018-09-20 RX ORDER — DIAZEPAM 5 MG
5 TABLET ORAL ONCE
Status: COMPLETED | OUTPATIENT
Start: 2018-09-20 | End: 2018-09-20

## 2018-09-20 RX ORDER — NAPROXEN 500 MG/1
500 TABLET ORAL 2 TIMES DAILY PRN
Qty: 20 TABLET | Refills: 0 | Status: SHIPPED | OUTPATIENT
Start: 2018-09-20 | End: 2018-10-15

## 2018-09-20 RX ORDER — DIAZEPAM 5 MG
5 TABLET ORAL EVERY 6 HOURS PRN
Qty: 20 TABLET | Refills: 0 | Status: SHIPPED | OUTPATIENT
Start: 2018-09-20 | End: 2018-09-27

## 2018-09-20 RX ADMIN — KETOROLAC TROMETHAMINE 30 MG: 30 INJECTION, SOLUTION INTRAMUSCULAR at 05:13

## 2018-09-20 RX ADMIN — DIAZEPAM 5 MG: 5 TABLET ORAL at 05:13

## 2018-09-20 ASSESSMENT — ENCOUNTER SYMPTOMS
NUMBNESS: 0
SHORTNESS OF BREATH: 0
HEADACHES: 0
BACK PAIN: 0
DYSURIA: 0
PALPITATIONS: 0
NAUSEA: 0
DIFFICULTY URINATING: 0
SINUS PRESSURE: 0
SINUS PAIN: 0
FATIGUE: 0
NECK PAIN: 1
DIAPHORESIS: 0
COUGH: 0
ABDOMINAL PAIN: 0
DIZZINESS: 0
LIGHT-HEADEDNESS: 0
WOUND: 0
FACIAL SWELLING: 0
JOINT SWELLING: 0
TROUBLE SWALLOWING: 0
APPETITE CHANGE: 0
SORE THROAT: 0
MYALGIAS: 1
WEAKNESS: 0
RHINORRHEA: 0
CHILLS: 0
FEVER: 0
CHEST TIGHTNESS: 0
CONSTITUTIONAL NEGATIVE: 1
VOMITING: 0
CONFUSION: 0

## 2018-09-20 NOTE — ED AVS SNAPSHOT
[facility], [place], Emergency Department    [address]    [facility] [place] [address]    Phone:  [phone]    Fax:  [phone]                                       Delio Agrawal   MRN: [id number]    Department:  [facility], Emergency Department   Date of Visit:  9/20/2018           After Visit Summary Signature Page     I have received my discharge instructions, and my questions have been answered. I have discussed any challenges I see with this plan with the nurse or doctor.    ..........................................................................................................................................  Patient/Patient Representative Signature      ..........................................................................................................................................  Patient Representative Print Name and Relationship to Patient    ..................................................               ................................................  Date                                   Time    ..........................................................................................................................................  Reviewed by Signature/Title    ...................................................              ..............................................  Date                                               Time          22EPIC Rev 08/18

## 2018-09-20 NOTE — ED AVS SNAPSHOT
[facility], Emergency Department    [address]    [place] [address]    Phone:  [phone]    Fax:  [phone]                                       Delio Agrawal   MRN: [id number]    Department:  [facility], Emergency Department   Date of Visit:  9/20/2018           Patient Information     Date Of Birth          1970        Your diagnoses for this visit were:     Neck pain        You were seen by Kevin Hernandez MD.      Follow-up Information     Follow up with Cassandra Apodaca APRN CNP.    Specialty:  Nurse Practitioner    Contact information:    [address] [facility] [id number] [phone]          Discharge Instructions       Stop taking Flexeril   Take naproxen and diazepam as directed.  Do not drive when taking diazepam.  Rest.  Apply warm compresses several times daily to affected area.  Clinic recheck one week.  Return if persistent symptoms.    Your next 10 appointments already scheduled     Oct 15, 2018  3:40 PM CDT   (Arrive by 3:25 PM)   RETURN ENDOCRINE with Birdie Proctor MD   [facility] End[facility]ology ([facility])    [address] [place] [address] [phone]              24 Hour Appointment Hotline       To make an appointment at any [facility] clinic, call [phone]). If you don't have a family doctor or clinic, we will help you find one. [facility] clinics are conveniently located to serve the needs of you and your family.             Review of your medicines      START taking        Dose / Directions Last dose taken    diazepam 5 MG tablet   Commonly known as:  VALIUM   Dose:  5 mg   Quantity:  20 tablet        Take 1 tablet (5 mg) by mouth every 6 hours as needed for muscle spasms or pain (MUSCLE SPASM)   Refills:  0        naproxen 500 MG tablet   Commonly known as:  NAPROSYN   Dose:  500 mg   Quantity:  20 tablet        Take 1 tablet (500 mg) by mouth 2 times daily as needed for  moderate pain   Refills:  0          Our records show that you are taking the medicines listed below. If these are incorrect, please call your family doctor or clinic.        Dose / Directions Last dose taken    calcitRIOL 0.5 MCG capsule   Commonly known as:  ROCALTROL   Dose:  0.5 mcg   Quantity:  90 capsule        Take 1 capsule (0.5 mcg) by mouth daily   Refills:  3        calcium carbonate 500 MG chewable tablet   Commonly known as:  TUMS   Dose:  2 chew tab   Quantity:  150 tablet        Take 2 tablets (1,000 mg) by mouth 3 times daily   Refills:  1        ciclopirox 8 % Soln   Quantity:  6.6 mL        Apply to adjacent skin and affected nails daily.  Remove with alcohol every 7 days, then repeat.   Refills:  1        desonide 0.05 % cream   Commonly known as:  DESOWEN   Quantity:  60 g        Apply a thin layer to affected area on face BID x 5-7 days. Tapering with improvement. Restarting as needed.   Refills:  0        fluocinonide 0.05 % cream   Commonly known as:  LIDEX   Quantity:  120 g        Apply a thin layer to foot BID. Do not use on face or body folds.   Refills:  0        ketoconazole 2 % cream   Commonly known as:  NIZORAL   Quantity:  60 g        Apply to foot BID   Refills:  2        levothyroxine 112 MCG tablet   Commonly known as:  SYNTHROID/LEVOTHROID   Dose:  112 mcg   Quantity:  90 tablet        Take 1 tablet (112 mcg) by mouth daily   Refills:  3        methylPREDNISolone 4 MG tablet   Commonly known as:  MEDROL DOSEPAK   Quantity:  21 tablet        Follow package instructions   Refills:  0        metroNIDAZOLE 0.75 % Lotn   Quantity:  59 mL        Apply to affected face BID x 1-2 months   Refills:  1        senna-docusate 8.6-50 MG per tablet   Commonly known as:  SENOKOT-S;PERICOLACE   Dose:  1-2 tablet   Quantity:  30 tablet        Take 1-2 tablets by mouth 2 times daily Take while on oral narcotics to prevent or treat constipation.   Refills:  0          STOP taking        Dose Reason  for stopping Comments    ADVIL PO              cyclobenzaprine 5 MG tablet   Commonly known as:  FLEXERIL                      Prescriptions were sent or printed at these locations (2 Prescriptions)                   Other Prescriptions                Printed at Department/Unit printer (2 of 2)         diazepam (VALIUM) 5 MG tablet               naproxen (NAPROSYN) 500 MG tablet                Orders Needing Specimen Collection     None      Pending Results     No orders found from 9/18/2018 to 9/21/2018.            Pending Culture Results     No orders found from 9/18/2018 to 9/21/2018.            Pending Results Instructions     If you had any lab results that were not finalized at the time of your Discharge, you can call the ED Lab Result RN at [phone]. You will be contacted by this team for any positive Lab results or changes in treatment. The nurses are available 7 days a week from 10A to 6:30P.  You can leave a message 24 hours per day and they will return your call.        Thank you for choosing [facility]       Thank you for choosing [facility] for your care. Our goal is always to provide you with excellent care. Hearing back from our patients is one way we can continue to improve our services. Please take a few minutes to complete the written survey that you may receive in the mail after you visit with us. Thank you!        [employer] Information     [employer] gives you secure access to your electronic health record. If you see a primary care provider, you can also send messages to your care team and make appointments. If you have questions, please call your primary care clinic.  If you do not have a primary care provider, please call [phone] and they will assist you.        Care EveryWhere ID     This is your Care EveryWhere ID. This could be used by other organizations to access your [facility] medical records  [id number]        Equal Access to Services     [facility] SHELBY AH: Sepideh Olivares  gonzales armenta, asiya scanlon. So [facility] [phone].    ATENCIÓN: Si habla español, tiene a clemente disposición servicios gratuitos de asistencia lingüística. Llame al [phone].    We comply with applicable federal civil rights laws and [place] laws. We do not discriminate on the basis of race, color, national origin, age, disability, sex, sexual orientation, or gender identity.            After Visit Summary       This is your record. Keep this with you and show to your community pharmacist(s) and doctor(s) at your next visit.

## 2018-09-20 NOTE — DISCHARGE INSTRUCTIONS
Stop taking Flexeril   Take naproxen and diazepam as directed.  Do not drive when taking diazepam.  Rest.  Apply warm compresses several times daily to affected area.  Clinic recheck one week.  Return if persistent symptoms.

## 2018-09-20 NOTE — PROGRESS NOTES
HPI:  Delio is a 49 yo female, accompanied by her , who presents for stiff and sore neck.  No trauma.  Began feeling sore 4 days ago and then after her  massaged area, neck became more stiff.  Earlier in the day she'd picked up a table in the basement but did not feel neck pain during that action.  Has Tried heat/ice therapy and taking ibuprofen 400mg.  Also tried a massage tool.  Reports no relief and today it feels worsen.  Indicates location of pain is just right lateral to cervical spine.     Aggravators: Turning head or lifting something with her right arm.  Denies numbness or tingling.      ROS:  See HPI      PE:  Vitals & nursing notes reviewed. B/P: 104/68, T: 98.3, P: 64, R: Data Unavailable  Constitutional:  Alert, well nourished, well-developed, NAD  MS:  Neck - TTP on right cervical paraspinal m and right scalene m. / trapezius.  Patient holding head stiffly/guarding.  Reduced ROM on forward & lateral flexion and rotation BL.  Raising right arm aggravates pain in neck.   Strength:  Bicep  5/5, arm raise 5/5  Fingers &   5/5    ASSESSMENT:  (M54.2) Neck pain  (primary encounter diagnosis)  Comment: Muscular strain  Plan: methylPREDNISolone (MEDROL DOSEPAK) 4 MG         tablet,   Flexeril - see orders - discussed will cause drowsiness.  Ice/heat therapy. Ibuprofen or tylenol PRN.  Patient given printout on home cares for neck pain per epic references.   F/U with PCP if sx persist or worsen.

## 2018-09-20 NOTE — ED PROVIDER NOTES
History     Chief Complaint   Patient presents with     Neck Pain     patient went to urgent care for neck pain and was prescribed muscle reklaxant (Flexeril), few hours, patient complained increasing neck pain     HPI  Delio Agrawal is a 48 year old female who presents with right side neck pain. She was seen at an urgent care clinic and given Flexeril but felt worse after taking it. No weakness or sensation loss. No trauma. It is painful to try to move neck to right or left. No swelling. No fever or chills. No sore throat or dysphagia. Notes pain into the right trapezius muscle. No upper extremity pain or swelling. Onset 2 days prior to admission. No midline pain. No history of arthritis, disc disease, or inflammatory conditions. No anterior neck pain. No headache or confusion.         I have reviewed the Medications, Allergies, Past Medical and Surgical History, and Social History in the [employer] system.  Past Medical History:   Diagnosis Date     Goiter      Subclinical hyperthyroidism        Review of Systems   Constitutional: Negative.  Negative for appetite change, chills, diaphoresis, fatigue and fever.   HENT: Negative for congestion, dental problem, ear pain, facial swelling, mouth sores, rhinorrhea, sinus pain, sinus pressure, sore throat and trouble swallowing.    Eyes: Negative for visual disturbance.   Respiratory: Negative for cough, chest tightness and shortness of breath.    Cardiovascular: Negative for chest pain, palpitations and leg swelling.   Gastrointestinal: Negative for abdominal pain, nausea and vomiting.   Genitourinary: Negative for difficulty urinating and dysuria.   Musculoskeletal: Positive for myalgias and neck pain. Negative for back pain and joint swelling.   Skin: Negative for rash and wound.   Allergic/Immunologic: Negative for immunocompromised state.   Neurological: Negative for dizziness, syncope, weakness, light-headedness, numbness and headaches.   Psychiatric/Behavioral:  "Negative for confusion.       Physical Exam   BP: 116/76  Pulse: 85  Heart Rate: 63  Temp: 97.8  F (36.6  C)  Resp: 20  Height: 154 cm (5' 0.63\")  Weight: 67 kg (147 lb 11.2 oz)  SpO2: 100 %      Physical Exam   Constitutional: She appears well-developed and well-nourished. No distress.   HENT:   Head: Normocephalic and atraumatic.   Right Ear: Tympanic membrane and ear canal normal.   Left Ear: Tympanic membrane and ear canal normal.   Nose: Nose normal.   Mouth/Throat: Uvula is midline and oropharynx is clear and moist. No oral lesions. No trismus in the jaw. No uvula swelling.   Eyes: Conjunctivae and EOM are normal. Pupils are equal, round, and reactive to light.   Neck: Trachea normal. Normal carotid pulses and no JVD present. Muscular tenderness present. No spinous process tenderness present. Carotid bruit is not present. Decreased range of motion present. No edema and no erythema present. No thyroid mass present.   Cardiovascular: Normal rate, regular rhythm, normal heart sounds and intact distal pulses.    Pulmonary/Chest: Effort normal and breath sounds normal. No respiratory distress.   Abdominal: Soft. There is no tenderness.   Musculoskeletal: She exhibits tenderness.        Right shoulder: Normal. She exhibits normal range of motion.        Cervical back: She exhibits decreased range of motion, tenderness and pain. She exhibits no bony tenderness, no swelling, no edema, no spasm and normal pulse.        Thoracic back: Normal.   Neurological: She is alert. She has normal strength and normal reflexes. No cranial nerve deficit or sensory deficit. Coordination normal.   Skin: Skin is warm and dry.   Psychiatric: She has a normal mood and affect. Her behavior is normal.   Nursing note and vitals reviewed.      ED Course     ED Course     Procedures             Critical Care time:  none             Labs Ordered and Resulted from Time of ED Arrival Up to the Time of Departure from the ED - No data to " display         Assessments & Plan (with Medical Decision Making)   Neck pain, wry neck, suspect muscle strain. Will discontinue Flexeril. Given IM ketorolac and oral diazepam in ED with significant improvement. Discharge with naproxen and diazepam, rest and warm compresses. Clinic follow up one week and return if persistent symptoms. Cervical strain most likely. No evidence of local infection, no midline spine tenderness, no evidence of aneurysm or carotid dissection. No masses or swelling. No evidence of neurologic injury.        I have reviewed the nursing notes.    I have reviewed the findings, diagnosis, plan and need for follow up with the patient.    Discharge Medication List as of 9/20/2018  5:50 AM      START taking these medications    Details   diazepam (VALIUM) 5 MG tablet Take 1 tablet (5 mg) by mouth every 6 hours as needed for muscle spasms or pain (MUSCLE SPASM), Disp-20 tablet, R-0, Local Print      naproxen (NAPROSYN) 500 MG tablet Take 1 tablet (500 mg) by mouth 2 times daily as needed for moderate pain, Disp-20 tablet, R-0, Local Print             Final diagnoses:   Neck pain       9/20/2018   [facility], [place], EMERGENCY DEPARTMENT     Kevin Hernandez MD  09/20/18 [id number]

## 2018-10-15 ENCOUNTER — OFFICE VISIT (OUTPATIENT)
Dept: ENDOCRINOLOGY | Facility: CLINIC | Age: 48
End: 2018-10-15
Payer: COMMERCIAL

## 2018-10-15 VITALS
SYSTOLIC BLOOD PRESSURE: 99 MMHG | DIASTOLIC BLOOD PRESSURE: 64 MMHG | BODY MASS INDEX: 30.04 KG/M2 | WEIGHT: 159.1 LBS | HEIGHT: 61 IN | HEART RATE: 63 BPM

## 2018-10-15 DIAGNOSIS — E83.51 HYPOCALCEMIA: ICD-10-CM

## 2018-10-15 DIAGNOSIS — E89.0 POSTSURGICAL HYPOTHYROIDISM: ICD-10-CM

## 2018-10-15 DIAGNOSIS — E89.0 POSTSURGICAL HYPOTHYROIDISM: Primary | ICD-10-CM

## 2018-10-15 LAB
CALCIUM SERPL-MCNC: 7.4 MG/DL (ref 8.5–10.1)
CREAT SERPL-MCNC: 0.79 MG/DL (ref 0.52–1.04)
GFR SERPL CREATININE-BSD FRML MDRD: 77 ML/MIN/1.7M2
PHOSPHATE SERPL-MCNC: 4.1 MG/DL (ref 2.5–4.5)
PTH-INTACT SERPL-MCNC: 7 PG/ML (ref 18–80)
TSH SERPL DL<=0.005 MIU/L-ACNC: 0.73 MU/L (ref 0.4–4)

## 2018-10-15 ASSESSMENT — PAIN SCALES - GENERAL: PAINLEVEL: NO PAIN (0)

## 2018-10-15 NOTE — LETTER
10/15/2018     RE: Delio Agrawal  [address]  [facility] [place] [facility] [address]     Dear Colleague,    Thank you for referring your patient, Delio Agrawal, to the [facility] ENDOCRINOLOGY at [facility]. Please see a copy of my visit note below.    TELEPHONE VISIT    Attending Assessment/Plan :     1.  Hypocalcemia, presumably postsurgical hypoparathyroidism. She is on lower dose of calcium than I expected, calcium carbonate 500 mg tid.   Labs today show lower calcium.  I will suggest she increase Ca to qid.   RTC 6 months    2.  Post surgical hypothyroidism.    currently on  112 mcg/day. And biochemically euthyroid on follow up labs    Birdie Proctor MD    Cc/ HISTORY OF PRESENT ILLNESS Delio returns, along with her ,  for followup of postsurgical hypothyroidism, postoperative hypocalcemia.      On 12/1/17 she was treated with total thyroidectomy removing 139 gram multinodular goiter.  There was a small fragment of parathyroid tissue on the left lobectomy specimen.  Postoperative PTH was < 3 on 12/2, 12/1, 1/5/18 and < 7 on 3/6/18.  Calcium was 8.6 on 3/6/18.  I harlan saw her 6/6/18.  At that time she was on calcitriol 0.5 mcg/day and Ca Co3 1000 mg tid. Today  I can see she never read the mychart note I sent on 6/7/18.      We made the following med changes at that time including to change to calcitriol 0.5 mcg/day and to reduce LT4 from 125 to 112 mcg/day. Today she reports she is taking:  LT4 112 mcg/day  Calcitriol 0.5 mcg/day  Calcium carbonate 500 mg tid. (she showed me the pill bottle which is how I know the dose)    REVIEW OF SYSTEMS  Energy good  Weight loss due to effort  Sleep very good  Cardiac: negative  Respiratory: negative  GI: constipation sometimes.   A lot of herbs.    No muscle cramps.   Sometimes paresthesias    Past Medical History  Past Medical History:   Diagnosis Date     Goiter      Subclinical hyperthyroidism      Medications  Current Outpatient  "Prescriptions   Medication Sig Dispense Refill     calcitRIOL (ROCALTROL) 0.5 MCG capsule Take 1 capsule (0.5 mcg) by mouth daily 90 capsule 3     calcium carbonate (TUMS) 500 MG chewable tablet Take 2 tablets (1,000 mg) by mouth 3 times daily 150 tablet 1     levothyroxine (SYNTHROID/LEVOTHROID) 112 MCG tablet Take 1 tablet (112 mcg) by mouth daily 90 tablet 3      She is in vitamin D3 2000 international units /day liquid    Allergies  Allergies   Allergen Reactions     Flexeril [Cyclobenzaprine] Shortness Of Breath       Family History  family history includes Thyroid Disease in her maternal aunt, sister, sister, and another family member.   Thyroid surgery in sister and aunt    Social History    Social History   Substance Use Topics     Smoking status: Never Smoker     Smokeless tobacco: Never Used     Alcohol use No     From [place].  is Tracy, who works in the OR. They eat fruit every night x 1 year - eats a lot of vegetable.  A lot of herbs.  Eats dried figs (each has 220 mg calcium) daily.       EXAM:  GENERAL middle aged woman inNAD  BP 99/64  Pulse 63  Ht 1.549 m (5' 1\")  Wt 72.2 kg (159 lb 1.6 oz)  LMP 10/14/2018  Breastfeeding? No  BMI 30.06 kg/m2  SKIN: normal color, temperature, texture   HEENT: PER,  no scleral icterus, eyelid retraction, stare, lid lag, proptosis or conjunctival injection.     NECK: supple.  Healed surgical scar   LUNGS: clear to auscultation bilaterally.   CARDIAC: RRR, S1, S2 without murmurs, rubs or gallops.    NEURO: Alert, responds appropriately to questions, moves all extremities,  no tremor of the outstretched hand    DATA REVIEW    Results for ABDIEL ROSS (MRN [id number]) as of 10/16/2018 12:02   Ref. Range 10/15/2018 16:25   Creatinine Latest Ref Range: 0.52 - 1.04 mg/dL 0.79   GFR Estimate Latest Ref Range: >60 mL/min/1.7m2 77   GFR Estimate If Black Latest Ref Range: >60 mL/min/1.7m2 >90   Calcium Latest Ref Range: 8.5 - 10.1 mg/dL 7.4 (L)   Phosphorus " Latest Ref Range: 2.5 - 4.5 mg/dL 4.1   TSH Latest Ref Range: 0.40 - 4.00 mU/L 0.73   Parathyroid Hormone Intact Latest Ref Range: 18 - 80 pg/mL 7 (L)     ENDO CALCIUM LABS-UMP Latest Ref Rng & Units 6/6/2018 4/30/2018   CALCIUM 8.5 - 10.1 mg/dL 8.1 (L) 8.5   CALCIUM IONIZED 4.4 - 5.2 mg/dL     PHOSPHOROUS 2.5 - 4.5 mg/dL 4.0    ALBUMIN 3.4 - 5.0 g/dL  4.1   BUN 7 - 30 mg/dL  11   CREATININE 0.52 - 1.04 mg/dL 0.77 0.75   PARATHYROID HORMONE INTACT 18 - 80 pg/mL 7 (L)    ALKPHOS 40 - 150 U/L  57   25 OH VIT D2 ug/L     25 OH VIT D3 ug/L     25 OH VIT D TOTAL 20 - 75 ug/L     VITAMIN D DEFICIENCY SCREENING 20 - 75 ug/L  34   PROTEIN, TOTAL 6.8 - 8.8 g/dL  8.2     ENDO CALCIUM LABS-UMP Latest Ref Rng & Units 4/23/2018   CALCIUM 8.5 - 10.1 mg/dL 8.9   CALCIUM IONIZED 4.4 - 5.2 mg/dL 4.2 (L)   PHOSPHOROUS 2.5 - 4.5 mg/dL    ALBUMIN 3.4 - 5.0 g/dL    BUN 7 - 30 mg/dL    CREATININE 0.52 - 1.04 mg/dL 0.79   PARATHYROID HORMONE INTACT 18 - 80 pg/mL    ALKPHOS 40 - 150 U/L    25 OH VIT D2 ug/L    25 OH VIT D3 ug/L    25 OH VIT D TOTAL 20 - 75 ug/L    VITAMIN D DEFICIENCY SCREENING 20 - 75 ug/L    PROTEIN, TOTAL 6.8 - 8.8 g/dL      ENDO CALCIUM LABS-UMP Latest Ref Rng & Units 4/20/2018   CALCIUM 8.5 - 10.1 mg/dL 6.5 (L)   CALCIUM IONIZED 4.4 - 5.2 mg/dL 3.2 (L)   PHOSPHOROUS 2.5 - 4.5 mg/dL    ALBUMIN 3.4 - 5.0 g/dL    BUN 7 - 30 mg/dL    CREATININE 0.52 - 1.04 mg/dL    PARATHYROID HORMONE INTACT 18 - 80 pg/mL    ALKPHOS 40 - 150 U/L    25 OH VIT D2 ug/L <5   25 OH VIT D3 ug/L 36   25 OH VIT D TOTAL 20 - 75 ug/L <41   VITAMIN D DEFICIENCY SCREENING 20 - 75 ug/L    PROTEIN, TOTAL 6.8 - 8.8 g/dL      Again, thank you for allowing me to participate in the care of your patient.      Sincerely,    Birdie Proctor MD

## 2018-10-15 NOTE — MR AVS SNAPSHOT
After Visit Summary   10/15/2018    Delio Agrawal    MRN: [id number]           Patient Information     Date Of Birth          1970        Visit Information        Provider Department      10/15/2018 3:40 PM Birdie Proctor MD [facility]        Today's Diagnoses     Postsurgical hypothyroidism    -  1    Hypocalcemia          Care Instructions    Labs today    See me in about 6 mnths          Follow-ups after your visit        Follow-up notes from your care team     Return in about 6 months (around 4/15/2019).      Your next 10 appointments already scheduled     Apr 15, 2019  4:00 PM CDT   (Arrive by 3:45 PM)   RETURN ENDOCRINE with Birdie Proctor MD   [facility] ([facility])    [address] [facility]  [address] [facility] [address] [phone]              Future tests that were ordered for you today     Open Future Orders        Priority Expected Expires Ordered    Calcium Routine  10/15/2019 10/15/2018    Phosphorus Routine  10/15/2019 10/15/2018    Parathyroid Hormone Intact Routine  10/15/2019 10/15/2018    Creatinine Routine  10/15/2019 10/15/2018    TSH with free T4 reflex Routine  10/15/2019 10/15/2018            Who to contact     Please call your clinic at [phone] to:    Ask questions about your health    Make or cancel appointments    Discuss your medicines    Learn about your test results    Speak to your doctor            Additional Information About Your Visit        MyChart Information     [employer] gives you secure access to your electronic health record. If you see a primary care provider, you can also send messages to your care team and make appointments. If you have questions, please call your primary care clinic.  If you do not have a primary care provider, please call [phone] and they will assist you.      [employer] is an electronic gateway that provides easy, online access to your medical records. With  "MyChart, you can request a clinic appointment, read your test results, renew a prescription or communicate with your care team.     To access your existing account, please contact your [facility] or call [phone] for assistance.        Care EveryWhere ID     This is your Care EveryWhere ID. This could be used by other organizations to access your [facility] medical records  [id number]        Your Vitals Were     Pulse Height Last Period Breastfeeding? BMI (Body Mass Index)       63 1.549 m (5' 1\") 10/14/2018 No 30.06 kg/m2        Blood Pressure from Last 3 Encounters:   10/15/18 99/64   09/20/18 106/66   09/19/18 104/68    Weight from Last 3 Encounters:   10/15/18 72.2 kg (159 lb 1.6 oz)   09/20/18 67 kg (147 lb 11.2 oz)   09/19/18 69.3 kg (152 lb 12.8 oz)               Primary Care Provider Office Phone # Fax #    Cassandra Day Apodaca, APRN [facility] [phone] [address] [facility] [address]        Equal Access to Services     ANASTASIIA RYAN : Hadii aad ku hadasho Soomaali, waaxda luqadaha, qaybta kaalmada adeegyada, asiya piper hayklevern will de leon . So [facility] [phone].    ATENCIÓN: Si habla español, tiene a clemente disposición servicios gratuitos de asistencia lingüística. Llame al [phone].    We comply with applicable federal civil rights laws and [place] laws. We do not discriminate on the basis of race, color, national origin, age, disability, sex, sexual orientation, or gender identity.            Thank you!     Thank you for choosing [facility] ENDOCRINOLOGY  for your care. Our goal is always to provide you with excellent care. Hearing back from our patients is one way we can continue to improve our services. Please take a few minutes to complete the written survey that you may receive in the mail after your visit with us. Thank you!             Your Updated Medication List - Protect others around you: Learn how to safely use, store and throw " away your medicines at www.disposemymeds.org.          This list is accurate as of 10/15/18  3:59 PM.  Always use your most recent med list.                   Brand Name Dispense Instructions for use Diagnosis    calcitRIOL 0.5 MCG capsule    ROCALTROL    90 capsule    Take 1 capsule (0.5 mcg) by mouth daily    Hypocalcemia, Postsurgical hypothyroidism       calcium carbonate 500 MG chewable tablet    TUMS    150 tablet    Take 2 tablets (1,000 mg) by mouth 3 times daily    Goiter       levothyroxine 112 MCG tablet    SYNTHROID/LEVOTHROID    90 tablet    Take 1 tablet (112 mcg) by mouth daily    Hypocalcemia, Postsurgical hypothyroidism

## 2018-10-15 NOTE — PROGRESS NOTES
TELEPHONE VISIT    Attending Assessment/Plan :     1.  Hypocalcemia, presumably postsurgical hypoparathyroidism. She is on lower dose of calcium than I expected, calcium carbonate 500 mg tid.   Labs today show lower calcium.  I will suggest she increase Ca to qid.   RTC 6 months    2.  Post surgical hypothyroidism.    currently on  112 mcg/day. And biochemically euthyroid on follow up labs    Birdie Proctor MD    Cc/ HISTORY OF PRESENT ILLNESS Delio returns, along with her ,  for followup of postsurgical hypothyroidism, postoperative hypocalcemia.      On 12/1/17 she was treated with total thyroidectomy removing 139 gram multinodular goiter.  There was a small fragment of parathyroid tissue on the left lobectomy specimen.  Postoperative PTH was < 3 on 12/2, 12/1, 1/5/18 and < 7 on 3/6/18.  Calcium was 8.6 on 3/6/18.  I harlan saw her 6/6/18.  At that time she was on calcitriol 0.5 mcg/day and Ca Co3 1000 mg tid. Today  I can see she never read the [employer] note I sent on 6/7/18.      We made the following med changes at that time including to change to calcitriol 0.5 mcg/day and to reduce LT4 from 125 to 112 mcg/day. Today she reports she is taking:  LT4 112 mcg/day  Calcitriol 0.5 mcg/day  Calcium carbonate 500 mg tid. (she showed me the pill bottle which is how I know the dose)    REVIEW OF SYSTEMS  Energy good  Weight loss due to effort  Sleep very good  Cardiac: negative  Respiratory: negative  GI: constipation sometimes.   A lot of herbs.    No muscle cramps.   Sometimes paresthesias    Past Medical History  Past Medical History:   Diagnosis Date     Goiter      Subclinical hyperthyroidism      Medications  Current Outpatient Prescriptions   Medication Sig Dispense Refill     calcitRIOL (ROCALTROL) 0.5 MCG capsule Take 1 capsule (0.5 mcg) by mouth daily 90 capsule 3     calcium carbonate (TUMS) 500 MG chewable tablet Take 2 tablets (1,000 mg) by mouth 3 times daily 150 tablet 1     levothyroxine  "(SYNTHROID/LEVOTHROID) 112 MCG tablet Take 1 tablet (112 mcg) by mouth daily 90 tablet 3      She is in vitamin D3 2000 international units /day liquid    Allergies  Allergies   Allergen Reactions     Flexeril [Cyclobenzaprine] Shortness Of Breath       Family History  family history includes Thyroid Disease in her maternal aunt, sister, sister, and another family member.   Thyroid surgery in sister and aunt    Social History    Social History   Substance Use Topics     Smoking status: Never Smoker     Smokeless tobacco: Never Used     Alcohol use No     From [place].  is Tracy, who works in the OR. They eat fruit every night x 1 year - eats a lot of vegetable.  A lot of herbs.  Eats dried figs (each has 220 mg calcium) daily.       EXAM:  GENERAL middle aged woman inNAD  BP 99/64  Pulse 63  Ht 1.549 m (5' 1\")  Wt 72.2 kg (159 lb 1.6 oz)  LMP 10/14/2018  Breastfeeding? No  BMI 30.06 kg/m2  SKIN: normal color, temperature, texture   HEENT: PER,  no scleral icterus, eyelid retraction, stare, lid lag, proptosis or conjunctival injection.     NECK: supple.  Healed surgical scar   LUNGS: clear to auscultation bilaterally.   CARDIAC: RRR, S1, S2 without murmurs, rubs or gallops.    NEURO: Alert, responds appropriately to questions, moves all extremities,  no tremor of the outstretched hand    DATA REVIEW    Results for ABDIEL ROSS (MRN [id number]) as of 10/16/2018 12:02   Ref. Range 10/15/2018 16:25   Creatinine Latest Ref Range: 0.52 - 1.04 mg/dL 0.79   GFR Estimate Latest Ref Range: >60 mL/min/1.7m2 77   GFR Estimate If Black Latest Ref Range: >60 mL/min/1.7m2 >90   Calcium Latest Ref Range: 8.5 - 10.1 mg/dL 7.4 (L)   Phosphorus Latest Ref Range: 2.5 - 4.5 mg/dL 4.1   TSH Latest Ref Range: 0.40 - 4.00 mU/L 0.73   Parathyroid Hormone Intact Latest Ref Range: 18 - 80 pg/mL 7 (L)     ENDO CALCIUM LABS-[facility] Latest Ref Rng & Units 6/6/2018 4/30/2018   CALCIUM 8.5 - 10.1 mg/dL 8.1 (L) 8.5   CALCIUM IONIZED " 4.4 - 5.2 mg/dL     PHOSPHOROUS 2.5 - 4.5 mg/dL 4.0    ALBUMIN 3.4 - 5.0 g/dL  4.1   BUN 7 - 30 mg/dL  11   CREATININE 0.52 - 1.04 mg/dL 0.77 0.75   PARATHYROID HORMONE INTACT 18 - 80 pg/mL 7 (L)    ALKPHOS 40 - 150 U/L  57   25 OH VIT D2 ug/L     25 OH VIT D3 ug/L     25 OH VIT D TOTAL 20 - 75 ug/L     VITAMIN D DEFICIENCY SCREENING 20 - 75 ug/L  34   PROTEIN, TOTAL 6.8 - 8.8 g/dL  8.2     ENDO CALCIUM LABS-[facility] Latest Ref Rng & Units 4/23/2018   CALCIUM 8.5 - 10.1 mg/dL 8.9   CALCIUM IONIZED 4.4 - 5.2 mg/dL 4.2 (L)   PHOSPHOROUS 2.5 - 4.5 mg/dL    ALBUMIN 3.4 - 5.0 g/dL    BUN 7 - 30 mg/dL    CREATININE 0.52 - 1.04 mg/dL 0.79   PARATHYROID HORMONE INTACT 18 - 80 pg/mL    ALKPHOS 40 - 150 U/L    25 OH VIT D2 ug/L    25 OH VIT D3 ug/L    25 OH VIT D TOTAL 20 - 75 ug/L    VITAMIN D DEFICIENCY SCREENING 20 - 75 ug/L    PROTEIN, TOTAL 6.8 - 8.8 g/dL      ENDO CALCIUM LABS[facility] Latest Ref Rng & Units 4/20/2018   CALCIUM 8.5 - 10.1 mg/dL 6.5 (L)   CALCIUM IONIZED 4.4 - 5.2 mg/dL 3.2 (L)   PHOSPHOROUS 2.5 - 4.5 mg/dL    ALBUMIN 3.4 - 5.0 g/dL    BUN 7 - 30 mg/dL    CREATININE 0.52 - 1.04 mg/dL    PARATHYROID HORMONE INTACT 18 - 80 pg/mL    ALKPHOS 40 - 150 U/L    25 OH VIT D2 ug/L <5   25 OH VIT D3 ug/L 36   25 OH VIT D TOTAL 20 - 75 ug/L <41   VITAMIN D DEFICIENCY SCREENING 20 - 75 ug/L    PROTEIN, TOTAL 6.8 - 8.8 g/dL

## 2019-04-07 DIAGNOSIS — E89.0 POSTSURGICAL HYPOTHYROIDISM: ICD-10-CM

## 2019-04-07 DIAGNOSIS — E83.51 HYPOCALCEMIA: ICD-10-CM

## 2019-04-07 RX ORDER — CALCITRIOL 0.5 UG/1
0.5 CAPSULE, LIQUID FILLED ORAL DAILY
Qty: 90 CAPSULE | Refills: 3 | Status: SHIPPED | OUTPATIENT
Start: 2019-04-07 | End: 2020-04-07

## 2019-04-16 ENCOUNTER — OFFICE VISIT (OUTPATIENT)
Dept: ENDOCRINOLOGY | Facility: CLINIC | Age: 49
End: 2019-04-16
Payer: COMMERCIAL

## 2019-04-16 VITALS
WEIGHT: 154.2 LBS | HEIGHT: 61 IN | BODY MASS INDEX: 29.11 KG/M2 | SYSTOLIC BLOOD PRESSURE: 107 MMHG | HEART RATE: 58 BPM | DIASTOLIC BLOOD PRESSURE: 71 MMHG

## 2019-04-16 DIAGNOSIS — E83.51 HYPOCALCEMIA: ICD-10-CM

## 2019-04-16 DIAGNOSIS — E89.0 POSTSURGICAL HYPOTHYROIDISM: ICD-10-CM

## 2019-04-16 DIAGNOSIS — E89.2 POST-SURGICAL HYPOPARATHYROIDISM (H): ICD-10-CM

## 2019-04-16 DIAGNOSIS — E89.2 POST-SURGICAL HYPOPARATHYROIDISM (H): Primary | ICD-10-CM

## 2019-04-16 LAB
CALCIUM SERPL-MCNC: 8.2 MG/DL (ref 8.5–10.1)
CREAT SERPL-MCNC: 0.75 MG/DL (ref 0.52–1.04)
GFR SERPL CREATININE-BSD FRML MDRD: >90 ML/MIN/{1.73_M2}
PHOSPHATE SERPL-MCNC: 3.8 MG/DL (ref 2.5–4.5)
TSH SERPL DL<=0.005 MIU/L-ACNC: 1.44 MU/L (ref 0.4–4)

## 2019-04-16 RX ORDER — CALCIUM CARBONATE 500 MG/1
2 TABLET, CHEWABLE ORAL DAILY
Qty: 150 TABLET | Refills: 3 | Status: SHIPPED | OUTPATIENT
Start: 2019-04-16 | End: 2022-06-06

## 2019-04-16 RX ORDER — LEVOTHYROXINE SODIUM 112 UG/1
112 TABLET ORAL DAILY
Qty: 90 TABLET | Refills: 3 | Status: SHIPPED | OUTPATIENT
Start: 2019-04-16 | End: 2020-04-24

## 2019-04-16 ASSESSMENT — PAIN SCALES - GENERAL: PAINLEVEL: NO PAIN (0)

## 2019-04-16 ASSESSMENT — MIFFLIN-ST. JEOR: SCORE: 1266.83

## 2019-04-16 NOTE — PROGRESS NOTES
"Endocrinology Attending Physician Progress note    Attending Assessment/Plan :       Post-surgical hypoparathyroidism (H)  continues over 1 year out from surgery.   Treatment includes calcitriol and calcium.  Labs today and every 6 months.Treat to low normal or slightly below normal target calcium.   24 hour urine calcium approximately once/year to be sure urine calcium isn't too high      Postsurgical hypothyroidism  Treat to normal target TSH.   Currently on LT4 112 mcg/day  Check TSH reflex approx once/year or sooner prn    Birdie Proctor MD    Cc/ HISTORY OF PRESENT ILLNESS Delio returns, along with her ,  for followup of postsurgical hypothyroidism, postoperative hypocalcemia.      On 12/1/17 she was treated with total thyroidectomy removing 139 gram multinodular goiter.  There was a small fragment of parathyroid tissue on the left lobectomy specimen.  Postoperative PTH was < 3 on 12/2, 12/1, 1/5/18 , < 7 on 3/6/18, 7 on 10/15/18 with calcium 7.4. .      I harlan saw her 6/6/18.  At that time she was on calcitriol 0.5 mcg/day and Ca Co3 1000 mg tid. Today  I can see she never read the [employer] note I sent on 6/7/18.      We made the following med changes at that time including to change to calcitriol 0.5 mcg/day and to reduce LT4 from 125 to 112 mcg/day. Today she reports she is taking:  LT4 112 mcg/day  Calcitriol 0.5 mcg/day  Calcium carbonate 500 mg tid. (she showed me the pill bottle which is how I know the dose)    REVIEW OF SYSTEMS  Weight is down 5  Lbs -throw a lot of tsones.  They are referring to bowel cleanse program she did x 4 days , using $63 kit from www.[employer]. ?  The preparation included distilled water, apple vinegar, 1 cup olive oil and epson sea salt - diet had to be organic # 9 including nuts x 4 days.   She stained her feces and photographed it and is showing me photos today - the last of which has the \"stones\" which look like white chunky material. She also showed me " "\"worms\" which were small linear structures.   No paresthesias  No muscle cramps  Can't remember last time she had low calcium symptoms  Sometimes hand tingling- treated with OJ and fig  Hand numbness when nervous  Surgical scar has improved using Argon oil      Past Medical History  Past Medical History:   Diagnosis Date     Goiter      Subclinical hyperthyroidism      Medications  Current Outpatient Medications   Medication Sig Dispense Refill     calcitRIOL (ROCALTROL) 0.5 MCG capsule Take 1 capsule (0.5 mcg) by mouth daily 90 capsule 3     calcium carbonate (TUMS) 500 MG chewable tablet Take 2 tablets (1,000 mg) by mouth daily 150 tablet 3     Cyanocobalamin (B-12) 1000 MCG TBCR Take 1,000 mcg by mouth daily 90 tablet 1     levothyroxine (SYNTHROID/LEVOTHROID) 112 MCG tablet Take 1 tablet (112 mcg) by mouth daily 90 tablet 3     vitamin D3 (CHOLECALCIFEROL) 1000 units (25 mcg) tablet Take 5 tablets (5,000 Units) by mouth daily        She is in vitamin D3 2000 international units /day liquid    Allergies  Allergies   Allergen Reactions     Flexeril [Cyclobenzaprine] Shortness Of Breath       Family History  family history includes Thyroid Disease in her maternal aunt, sister, sister, and another family member.     Social History    Social History     Tobacco Use     Smoking status: Never Smoker     Smokeless tobacco: Never Used   Substance Use Topics     Alcohol use: No     Drug use: No     From [place].  is Tracy, who works in the OR.     EXAM:  GENERAL middle aged woman inNAD  /71   Pulse 58   Ht 1.549 m (5' 1\")   Wt 69.9 kg (154 lb 3.2 oz)   BMI 29.14 kg/m    SKIN: normal color, temperature, texture   HEENT: PER,  no scleral icterus, eyelid retraction, stare, lid lag, proptosis or conjunctival injection.     NECK: supple.  Healed surgical scar   LUNGS: clear to auscultation bilaterally.   CARDIAC: RRR, S1, S2 without murmurs, rubs or gallops.    NEURO: Alert, responds appropriately to " questions, moves all extremities,  no tremor of the outstretched hand    DATA REVIEW    END[facility] CALCIUM LABS[facility] Latest Ref Rng & Units 4/16/2019   CALCIUM 8.5 - 10.1 mg/dL 8.2 (L)   CALCIUM IONIZED 4.4 - 5.2 mg/dL    PHOSPHOROUS 2.5 - 4.5 mg/dL 3.8   ALBUMIN 3.4 - 5.0 g/dL    BUN 7 - 30 mg/dL    CREATININE 0.52 - 1.04 mg/dL 0.75   PARATHYROID HORMONE INTACT 18 - 80 pg/mL    ALKPHOS 40 - 150 U/L    25 OH VIT D2 ug/L    25 OH VIT D3 ug/L    25 OH VIT D TOTAL 20 - 75 ug/L    VITAMIN D DEFICIENCY SCREENING 20 - 75 ug/L    PROTEIN, TOTAL 6.8 - 8.8 g/dL      ENDO CALCIUM LABS[facility] Latest Ref Rng & Units 10/15/2018   CALCIUM 8.5 - 10.1 mg/dL 7.4 (L)   CALCIUM IONIZED 4.4 - 5.2 mg/dL    PHOSPHOROUS 2.5 - 4.5 mg/dL 4.1   ALBUMIN 3.4 - 5.0 g/dL    BUN 7 - 30 mg/dL    CREATININE 0.52 - 1.04 mg/dL 0.79   PARATHYROID HORMONE INTACT 18 - 80 pg/mL 7 (L)   ALKPHOS 40 - 150 U/L    25 OH VIT D2 ug/L    25 OH VIT D3 ug/L    25 OH VIT D TOTAL 20 - 75 ug/L    VITAMIN D DEFICIENCY SCREENING 20 - 75 ug/L    PROTEIN, TOTAL 6.8 - 8.8 g/dL      ENDO CALCIUM LABS[facility] Latest Ref Rng & Units 6/6/2018 4/30/2018   CALCIUM 8.5 - 10.1 mg/dL 8.1 (L) 8.5   CALCIUM IONIZED 4.4 - 5.2 mg/dL     PHOSPHOROUS 2.5 - 4.5 mg/dL 4.0    ALBUMIN 3.4 - 5.0 g/dL  4.1   BUN 7 - 30 mg/dL  11   CREATININE 0.52 - 1.04 mg/dL 0.77 0.75   PARATHYROID HORMONE INTACT 18 - 80 pg/mL 7 (L)    ALKPHOS 40 - 150 U/L  57   25 OH VIT D2 ug/L     25 OH VIT D3 ug/L     25 OH VIT D TOTAL 20 - 75 ug/L     VITAMIN D DEFICIENCY SCREENING 20 - 75 ug/L  34   PROTEIN, TOTAL 6.8 - 8.8 g/dL  8.2     ENDO THYROID LABS-[facility] Latest Ref Rng & Units 4/16/2019 10/15/2018   TSH 0.40 - 4.00 mU/L 1.44 0.73   T4 FREE 0.76 - 1.46 ng/dL       ENDO THYROID LABS-[facility] Latest Ref Rng & Units 6/6/2018 4/20/2018   TSH 0.40 - 4.00 mU/L 1.16 0.01 (L)   T4 FREE 0.76 - 1.46 ng/dL 1.10 1.48 (H)

## 2019-04-16 NOTE — LETTER
4/16/2019       RE: Delio Agrawal  [address]  [facility] [address]     Dear Colleague,    Thank you for referring your patient, Delio Agrawal, to the [facility] ENDOCRINOLOGY at [facility]. Please see a copy of my visit note below.    Endocrinology Attending Physician Progress note    Attending Assessment/Plan :       Post-surgical hypoparathyroidism (H)  continues over 1 year out from surgery.   Treatment includes calcitriol and calcium.  Labs today and every 6 months.Treat to low normal or slightly below normal target calcium.   24 hour urine calcium approximately once/year to be sure urine calcium isn't too high      Postsurgical hypothyroidism  Treat to normal target TSH.   Currently on LT4 112 mcg/day  Check TSH reflex approx once/year or sooner prn    Birdie Proctor MD    Cc/ HISTORY OF PRESENT ILLNESS Delio returns, along with her ,  for followup of postsurgical hypothyroidism, postoperative hypocalcemia.      On 12/1/17 she was treated with total thyroidectomy removing 139 gram multinodular goiter.  There was a small fragment of parathyroid tissue on the left lobectomy specimen.  Postoperative PTH was < 3 on 12/2, 12/1, 1/5/18 , < 7 on 3/6/18, 7 on 10/15/18 with calcium 7.4. .      I harlan saw her 6/6/18.  At that time she was on calcitriol 0.5 mcg/day and Ca Co3 1000 mg tid. Today  I can see she never read the mychart note I sent on 6/7/18.      We made the following med changes at that time including to change to calcitriol 0.5 mcg/day and to reduce LT4 from 125 to 112 mcg/day. Today she reports she is taking:  LT4 112 mcg/day  Calcitriol 0.5 mcg/day  Calcium carbonate 500 mg tid. (she showed me the pill bottle which is how I know the dose)    REVIEW OF SYSTEMS  Weight is down 5  Lbs -throw a lot of tsones.  They are referring to bowel cleanse program she did x 4 days , using $63 kit from www.[employer]. ?  The preparation included distilled  "water, apple vinegar, 1 cup olive oil and epson sea salt - diet had to be organic # 9 including nuts x 4 days.   She stained her feces and photographed it and is showing me photos today - the last of which has the \"stones\" which look like white chunky material. She also showed me \"worms\" which were small linear structures.   No paresthesias  No muscle cramps  Can't remember last time she had low calcium symptoms  Sometimes hand tingling- treated with OJ and fig  Hand numbness when nervous  Surgical scar has improved using Argon oil      Past Medical History  Past Medical History:   Diagnosis Date     Goiter      Subclinical hyperthyroidism      Medications  Current Outpatient Medications   Medication Sig Dispense Refill     calcitRIOL (ROCALTROL) 0.5 MCG capsule Take 1 capsule (0.5 mcg) by mouth daily 90 capsule 3     calcium carbonate (TUMS) 500 MG chewable tablet Take 2 tablets (1,000 mg) by mouth daily 150 tablet 3     Cyanocobalamin (B-12) 1000 MCG TBCR Take 1,000 mcg by mouth daily 90 tablet 1     levothyroxine (SYNTHROID/LEVOTHROID) 112 MCG tablet Take 1 tablet (112 mcg) by mouth daily 90 tablet 3     vitamin D3 (CHOLECALCIFEROL) 1000 units (25 mcg) tablet Take 5 tablets (5,000 Units) by mouth daily        She is in vitamin D3 2000 international units /day liquid    Allergies  Allergies   Allergen Reactions     Flexeril [Cyclobenzaprine] Shortness Of Breath       Family History  family history includes Thyroid Disease in her maternal aunt, sister, sister, and another family member.     Social History    Social History     Tobacco Use     Smoking status: Never Smoker     Smokeless tobacco: Never Used   Substance Use Topics     Alcohol use: No     Drug use: No     From [place].  is Tracy, who works in the OR.     EXAM:  GENERAL middle aged woman inNAD  /71   Pulse 58   Ht 1.549 m (5' 1\")   Wt 69.9 kg (154 lb 3.2 oz)   BMI 29.14 kg/m     SKIN: normal color, temperature, texture   HEENT: PER,  " no scleral icterus, eyelid retraction, stare, lid lag, proptosis or conjunctival injection.     NECK: supple.  Healed surgical scar   LUNGS: clear to auscultation bilaterally.   CARDIAC: RRR, S1, S2 without murmurs, rubs or gallops.    NEURO: Alert, responds appropriately to questions, moves all extremities,  no tremor of the outstretched hand    DATA REVIEW    ENDO CALCIUM LABS[facility] Latest Ref Rng & Units 4/16/2019   CALCIUM 8.5 - 10.1 mg/dL 8.2 (L)   CALCIUM IONIZED 4.4 - 5.2 mg/dL    PHOSPHOROUS 2.5 - 4.5 mg/dL 3.8   ALBUMIN 3.4 - 5.0 g/dL    BUN 7 - 30 mg/dL    CREATININE 0.52 - 1.04 mg/dL 0.75   PARATHYROID HORMONE INTACT 18 - 80 pg/mL    ALKPHOS 40 - 150 U/L    25 OH VIT D2 ug/L    25 OH VIT D3 ug/L    25 OH VIT D TOTAL 20 - 75 ug/L    VITAMIN D DEFICIENCY SCREENING 20 - 75 ug/L    PROTEIN, TOTAL 6.8 - 8.8 g/dL      ENDO CALCIUM LABS[facility] Latest Ref Rng & Units 10/15/2018   CALCIUM 8.5 - 10.1 mg/dL 7.4 (L)   CALCIUM IONIZED 4.4 - 5.2 mg/dL    PHOSPHOROUS 2.5 - 4.5 mg/dL 4.1   ALBUMIN 3.4 - 5.0 g/dL    BUN 7 - 30 mg/dL    CREATININE 0.52 - 1.04 mg/dL 0.79   PARATHYROID HORMONE INTACT 18 - 80 pg/mL 7 (L)   ALKPHOS 40 - 150 U/L    25 OH VIT D2 ug/L    25 OH VIT D3 ug/L    25 OH VIT D TOTAL 20 - 75 ug/L    VITAMIN D DEFICIENCY SCREENING 20 - 75 ug/L    PROTEIN, TOTAL 6.8 - 8.8 g/dL      ENDO CALCIUM LABS[facility] Latest Ref Rng & Units 6/6/2018 4/30/2018   CALCIUM 8.5 - 10.1 mg/dL 8.1 (L) 8.5   CALCIUM IONIZED 4.4 - 5.2 mg/dL     PHOSPHOROUS 2.5 - 4.5 mg/dL 4.0    ALBUMIN 3.4 - 5.0 g/dL  4.1   BUN 7 - 30 mg/dL  11   CREATININE 0.52 - 1.04 mg/dL 0.77 0.75   PARATHYROID HORMONE INTACT 18 - 80 pg/mL 7 (L)    ALKPHOS 40 - 150 U/L  57   25 OH VIT D2 ug/L     25 OH VIT D3 ug/L     25 OH VIT D TOTAL 20 - 75 ug/L     VITAMIN D DEFICIENCY SCREENING 20 - 75 ug/L  34   PROTEIN, TOTAL 6.8 - 8.8 g/dL  8.2     ENDO THYROID LABS-[facility] Latest Ref Rng & Units 4/16/2019 10/15/2018   TSH 0.40 - 4.00 mU/L 1.44 0.73   T4 FREE 0.76 - 1.46 ng/dL        ENDO THYROID LABS-[facility] Latest Ref Rng & Units 6/6/2018 4/20/2018   TSH 0.40 - 4.00 mU/L 1.16 0.01 (L)   T4 FREE 0.76 - 1.46 ng/dL 1.10 1.48 (H)       Birdie Proctor MD

## 2019-04-16 NOTE — ASSESSMENT & PLAN NOTE
continues over 1 year out from surgery.   Treatment includes calcitriol and calcium.  Labs today and every 6 months.Treat to low normal or slightly below normal target calcium.   24 hour urine calcium approximately once/year to be sure urine calcium isn't too high

## 2019-04-16 NOTE — NURSING NOTE
Chief Complaint   Patient presents with     RECHECK     Postsurgical Hypothyroidism     Deb Stanley MA     clear conjunctiva

## 2019-04-16 NOTE — ASSESSMENT & PLAN NOTE
Treat to normal target TSH.   Currently on LT4 112 mcg/day  Check TSH reflex approx once/year or sooner prn

## 2019-05-03 DIAGNOSIS — E89.2 POST-SURGICAL HYPOPARATHYROIDISM (H): ICD-10-CM

## 2019-05-03 LAB
CALCIUM 24H UR-MRATE: 0.14 G/24 H (ref 0.1–0.3)
CALCIUM UR-MCNC: 5.2 MG/DL
CALCIUM/CREAT UR: 0.22 G/G CR
COLLECT DURATION TIME UR: 24 H
CREAT UR-MCNC: 24 MG/DL
SPECIMEN VOL UR: 2760 ML

## 2019-05-08 ENCOUNTER — OFFICE VISIT (OUTPATIENT)
Dept: URGENT CARE | Facility: URGENT CARE | Age: 49
End: 2019-05-08
Payer: COMMERCIAL

## 2019-05-08 VITALS
DIASTOLIC BLOOD PRESSURE: 67 MMHG | TEMPERATURE: 98.3 F | BODY MASS INDEX: 29.1 KG/M2 | OXYGEN SATURATION: 100 % | WEIGHT: 154 LBS | HEART RATE: 62 BPM | SYSTOLIC BLOOD PRESSURE: 101 MMHG

## 2019-05-08 DIAGNOSIS — H61.21 IMPACTED CERUMEN OF RIGHT EAR: ICD-10-CM

## 2019-05-08 DIAGNOSIS — H92.01 RIGHT EAR PAIN: Primary | ICD-10-CM

## 2019-05-08 PROCEDURE — 99213 OFFICE O/P EST LOW 20 MIN: CPT | Performed by: PHYSICIAN ASSISTANT

## 2019-05-08 RX ORDER — AMOXICILLIN 875 MG
875 TABLET ORAL 2 TIMES DAILY
Qty: 20 TABLET | Refills: 0 | Status: SHIPPED | OUTPATIENT
Start: 2019-05-08 | End: 2019-05-18

## 2019-05-08 NOTE — PROGRESS NOTES
HPI:  Delio is a 47 yo female, accompanied by her , who presents for ear pain BL x 1 day.   Denies fever, nasal congestion or cold sx.   Reports she was out in the wind yesterday and her ears began to hurt.      Reports new allergy to flexeril  - Nurse added this to her allergy list.       ROS:  See HPI      PE:  Vitals & nursing notes reviewed. B/P: 101/67, T: 98.3, P: 62, R: Data Unavailable  Constitutional:  Alert, well nourished, well-developed, NAD  Head:  Atraumatic, normocephalic  Eyes:  Perrla, EOMI, conjunctiva:  Pink   Sclera:  Anicteric  Ears:  RT Canal - cerumen impaction.  LT Canal clear without erythema.  RT TM - erythema opacity (visible after lavage)    LT TM pearly   Throat:  No erythema, exudates, or edema to postoropharynx  Neck:  Supple, no cervical LAD    ASSESSMENT:  1.  Ear pain  - Right  2.  Cerumen impaction - Right  Comment/Plan:  Lavage successful, but required aggressive lavage to remove cerum.  TM visible after lavage and appears inflammed with some opacity & patient reports ear hurts.  Erythema and increased pain Likely due to the aggressive lavage; however cannot fully RO OM.  Will provide Rx for Amox.  Patient to hold Rx for 1-2 days and use NSAIDS or tylenol for pain in the meantime.  If pain persists after 24 - 48 hrs, or worsens sooner, fill Rx and start Amoxicillin.   F/U with PCP if sx persist or worsen.

## 2019-06-24 ENCOUNTER — OFFICE VISIT (OUTPATIENT)
Dept: URGENT CARE | Facility: URGENT CARE | Age: 49
End: 2019-06-24
Payer: COMMERCIAL

## 2019-06-24 VITALS
OXYGEN SATURATION: 99 % | WEIGHT: 154 LBS | DIASTOLIC BLOOD PRESSURE: 70 MMHG | SYSTOLIC BLOOD PRESSURE: 117 MMHG | HEART RATE: 66 BPM | BODY MASS INDEX: 29.1 KG/M2 | TEMPERATURE: 97.8 F

## 2019-06-24 DIAGNOSIS — N89.8 VAGINAL IRRITATION: ICD-10-CM

## 2019-06-24 DIAGNOSIS — R30.0 DYSURIA: Primary | ICD-10-CM

## 2019-06-24 LAB
ALBUMIN UR-MCNC: NEGATIVE MG/DL
APPEARANCE UR: CLEAR
BACTERIA #/AREA URNS HPF: ABNORMAL /HPF
BILIRUB UR QL STRIP: NEGATIVE
COLOR UR AUTO: YELLOW
GLUCOSE UR STRIP-MCNC: NEGATIVE MG/DL
HGB UR QL STRIP: ABNORMAL
KETONES UR STRIP-MCNC: NEGATIVE MG/DL
LEUKOCYTE ESTERASE UR QL STRIP: ABNORMAL
NITRATE UR QL: NEGATIVE
NON-SQ EPI CELLS #/AREA URNS LPF: ABNORMAL /LPF
PH UR STRIP: 7 PH (ref 5–7)
RBC #/AREA URNS AUTO: ABNORMAL /HPF
SOURCE: ABNORMAL
SP GR UR STRIP: 1.02 (ref 1–1.03)
SPECIMEN SOURCE: NORMAL
UROBILINOGEN UR STRIP-ACNC: 0.2 EU/DL (ref 0.2–1)
WBC #/AREA URNS AUTO: ABNORMAL /HPF
WET PREP SPEC: NORMAL

## 2019-06-24 PROCEDURE — 87210 SMEAR WET MOUNT SALINE/INK: CPT | Performed by: PHYSICIAN ASSISTANT

## 2019-06-24 PROCEDURE — 81001 URINALYSIS AUTO W/SCOPE: CPT | Performed by: PHYSICIAN ASSISTANT

## 2019-06-24 PROCEDURE — 99213 OFFICE O/P EST LOW 20 MIN: CPT | Performed by: PHYSICIAN ASSISTANT

## 2019-06-25 NOTE — PROGRESS NOTES
"HPI:  Delio is a 48 yo female, accompanied by her , who presents for dysuria & burning with urination x 1-2 weeks.  Reports urinary urgency over last 3 days.  No blood in urine.  Denies fever or flank pain.  She does reports having \"hot flashes\" and wonders if she is entering menopause.  LMP was May 17.  She reports seeing red patch or spot on labia.  Patch is tender.  She denies it is ulcerative.  Denies hx of herpes.   Patient reports she does douche and used douche after her LMP.  She also states she does aggressively wipe genital area after using the bathroom.    ROS:  See HPI      PE:  Vitals & nursing notes reviewed. B/P: 117/70, T: 97.8, P: 66, R: Data Unavailable  Constitutional:  Alert, well nourished, well-developed, NAD  Genital:  Several small flat spots of erythema on labia similar In appearance to purpura although red in color not purple,  Located on mucosa around urethra meatus that is TTP.  Non-vesicular, not ulcerative appearing.       ASSESSMENT:  1.  Dysuria  (primary encounter diagnosis)  2. Vaginal irritation  Comment: Wet prep negative and UA unremarkable.  There are some tiny spots of inflammation/erythema  surrounding urethral meatus. Etiology of spots unclear.   Lesion appearance is not consistent with herpes.  However, I suspect the acidic nature of urine hitting these tender areas is what is causing her feelings of burning/dysuria.       Plan:  Cool compress. Tylenol or advil for pain & fever PRN, Topical Antibx ointment to lesions also to be used as a barrier to prevent urine from coming into contact with lesions. Do not douche as it disrupts natural balance of vaginal bill. Use softer toilet paper and dab versus more aggressive wiping.  Follow-up with GYN within 1-2 weeks, if sx persist and to be evaluated for possible perimenopausal sx.  Follow-up sooner if worsening.         "

## 2019-06-29 ENCOUNTER — OFFICE VISIT (OUTPATIENT)
Dept: URGENT CARE | Facility: URGENT CARE | Age: 49
End: 2019-06-29
Payer: COMMERCIAL

## 2019-06-29 VITALS
RESPIRATION RATE: 14 BRPM | BODY MASS INDEX: 25.66 KG/M2 | OXYGEN SATURATION: 98 % | DIASTOLIC BLOOD PRESSURE: 76 MMHG | HEART RATE: 77 BPM | WEIGHT: 154 LBS | TEMPERATURE: 98.4 F | SYSTOLIC BLOOD PRESSURE: 114 MMHG | HEIGHT: 65 IN

## 2019-06-29 DIAGNOSIS — R07.89 CHEST WALL PAIN: Primary | ICD-10-CM

## 2019-06-29 PROCEDURE — 99213 OFFICE O/P EST LOW 20 MIN: CPT | Performed by: PHYSICIAN ASSISTANT

## 2019-06-29 RX ORDER — IBUPROFEN 800 MG/1
800 TABLET, FILM COATED ORAL EVERY 6 HOURS PRN
Qty: 20 TABLET | Refills: 0 | Status: SHIPPED | OUTPATIENT
Start: 2019-06-29 | End: 2022-06-06

## 2019-06-29 ASSESSMENT — MIFFLIN-ST. JEOR: SCORE: 1324.42

## 2019-06-29 ASSESSMENT — PAIN SCALES - GENERAL: PAINLEVEL: MILD PAIN (3)

## 2019-06-30 NOTE — PATIENT INSTRUCTIONS
Your breast exam was normal today.  Your pain is likely due to chest wall muscular pain.   This is likely from heavy lifting.  Take ibuprofen 800mg up to every 6-8 hours for pain. Take with food.   May try applying a heating pad to the chest wall.  Avoid heavy lifting for this week or other activities that exacerbates the pain.  Follow up with your primary care provider if no improvement in 1 week, sooner if worsening.  If developing shortness of breath, cough, fever, coughing up blood, or severe pain, go to the ER immediately.

## 2019-06-30 NOTE — PROGRESS NOTES
"SUBJECTIVE:   Delio Agrawal is a 49 year old female presenting for evaluation of   Chief Complaint   Patient presents with     Urgent Care     Breast Pain     Pain at the top of left breast, may be d/t lifting heavy objects (re-arranged and cleaned home) x 2wks  Sharp pain. Pain radiates to the back and down left arm, pain comes and goes     Has been moving a lot of heavy objects and furniture in the house over the last few days. Started to have some pain in the left side of her chest and left arm. Pain comes and goes. It is worse with movements.  She has some itching in the left breast \"sometimes.\" She has not noted any lumps in the breast.  No nipple discharge.   She had a right breast biopsy last year. It was normal. Now she is just worried about the left breast due to this.  No SOB, cough. No lightheadedness. No fever. She has a lot of hot flashes due to menopause.  She has tried some [place] cream and this helped.  She is not breastfeeding.    ROS  See HPI    PMH:  Past Medical History:   Diagnosis Date     Goiter      Subclinical hyperthyroidism      Patient Active Problem List    Diagnosis Date Noted     Post-surgical hypoparathyroidism (H) 04/16/2019     Priority: Medium     12/1/17  total thyroidectomy removing 139 gram multinodular goiter.  There was a small fragment of parathyroid tissue on the left lobectomy specimen.      Postoperative PTH was < 3 on 12/2, 12/1, 1/5/18 , < 7 on 3/6/18, 7 on 10/15/18 with calcium 7.4. .        Hypocalcemia 04/20/2018     Priority: Medium     Postsurgical hypothyroidism 12/02/2017     Priority: Medium     12/1/17 treated with total thyroidectomy removing 139 gram multinodular goiter.       Status post total thyroidectomy 12/01/2017     Priority: Medium     CARDIOVASCULAR SCREENING; LDL GOAL LESS THAN 160 04/03/2015     Priority: Medium     History of BCG vaccination 04/03/2015     Priority: Medium         Current medications:  Current Outpatient Medications   Medication " "Sig Dispense Refill     calcitRIOL (ROCALTROL) 0.5 MCG capsule Take 1 capsule (0.5 mcg) by mouth daily 90 capsule 3     calcium carbonate (TUMS) 500 MG chewable tablet Take 2 tablets (1,000 mg) by mouth daily 150 tablet 3     Cyanocobalamin (B-12) 1000 MCG TBCR Take 1,000 mcg by mouth daily 90 tablet 1     ibuprofen (ADVIL/MOTRIN) 800 MG tablet Take 1 tablet (800 mg) by mouth every 6 hours as needed for moderate pain 20 tablet 0     levothyroxine (SYNTHROID/LEVOTHROID) 112 MCG tablet Take 1 tablet (112 mcg) by mouth daily 90 tablet 3     vitamin D3 (CHOLECALCIFEROL) 1000 units (25 mcg) tablet Take 5 tablets (5,000 Units) by mouth daily         Surgical History:  Past Surgical History:   Procedure Laterality Date     THYROIDECTOMY N/A 2017    Procedure: THYROIDECTOMY;  Total Thyroidectomy of substernal goiter;  Surgeon: Keyla Griffin MD;  Location:  OR       Family history:  Family History   Problem Relation Age of Onset     Thyroid Disease Sister         surgery x 2     Thyroid Disease Maternal Aunt         surgery complicated by loss of voice.   days later     Thyroid Disease Other      Thyroid Disease Sister          Social History:  Social History     Tobacco Use     Smoking status: Never Smoker     Smokeless tobacco: Never Used   Substance Use Topics     Alcohol use: No         OBJECTIVE:  /76 (BP Location: Right arm, Patient Position: Sitting, Cuff Size: Adult Regular)   Pulse 77   Temp 98.4  F (36.9  C) (Oral)   Resp 14   Ht 1.651 m (5' 5\")   Wt 69.9 kg (154 lb)   LMP  (LMP Unknown)   SpO2 98%   Breastfeeding? No   BMI 25.63 kg/m      Physical Exam  General: alert, appears well, NAD. Afebrile.  Skin: no suspicious lesions or rashes on breasts or chest wall.  Breasts: no masses. No dimpling. No erythema. No tenderness of breast tissue. No nipple discharge.  Respiratory: No distress. Equal inspiration to bilateral bases. No crackles wheeze, rhonchi, rales.   Cardiovascular: RRR. " No murmurs, clicks, gallups, or rub.   Musculoskeletal: chest: point tenderness over left chest wall msuculature, midclavicular line, around 4th rib, but not bony tenderness of rib.  Neurologic: Follows commands. Gait normal. Sensation grossly WNL.   Psychiatric: mentation appears normal and affect normal/bright           Labs:  No results found for this or any previous visit (from the past 24 hour(s)).    X-Ray was not done.      ASSESSMENT/PLAN:      ICD-10-CM    1. Chest wall pain R07.89 ibuprofen (ADVIL/MOTRIN) 800 MG tablet        Medical Decision Making:    Serious Comorbid Conditions: none affecting MDM today    Patient presents with left upper chest/breast pain x 2 days, after doing a lot of heavy lifting and cleaning at her house. I highly suspect musculoskeletal etiology of pain given reproducibility upon palpation of chest wall along mid-clavicular line, in 4th rib area. Do not suspect rib fracture given no specific injuries, no bony tenderness. Do not think rib xray is indicated today.  Breast exam normal today- no signs of mastitis. I did not find any breast lumps, and her tenderness was not within the breast tissue.  Nothing on history to suggest cardiopulmonary etiology of symptoms.  Recommend ibuprofen, prescribed 800mg tabs for use over the next several days. Recommend heating pad. Recommend rest, avoiding exacerbating activities. F/up with PCP if no improvement in 1 week.      At the end of the encounter, I discussed all available results with patient. Discussed diagnosis and treatment plan.   Return precautions provided to patient and printed below in patient instructions.  Patient understood and agreed to plan. Patient was appropriate for discharge.        Patient Instructions   Your breast exam was normal today.  Your pain is likely due to chest wall muscular pain.   This is likely from heavy lifting.  Take ibuprofen 800mg up to every 6-8 hours for pain. Take with food.   May try applying a  heating pad to the chest wall.  Avoid heavy lifting for this week or other activities that exacerbates the pain.  Follow up with your primary care provider if no improvement in 1 week, sooner if worsening.  If developing shortness of breath, cough, fever, coughing up blood, or severe pain, go to the ER immediately.              Rosa Tolliver PA-C  06/29/19 8:48 PM

## 2019-12-20 ENCOUNTER — OFFICE VISIT (OUTPATIENT)
Dept: URGENT CARE | Facility: URGENT CARE | Age: 49
End: 2019-12-20
Payer: COMMERCIAL

## 2019-12-20 VITALS
TEMPERATURE: 97.7 F | DIASTOLIC BLOOD PRESSURE: 58 MMHG | RESPIRATION RATE: 15 BRPM | HEIGHT: 65 IN | HEART RATE: 80 BPM | SYSTOLIC BLOOD PRESSURE: 106 MMHG | BODY MASS INDEX: 25.71 KG/M2 | WEIGHT: 154.32 LBS

## 2019-12-20 DIAGNOSIS — H61.23 BILATERAL IMPACTED CERUMEN: Primary | ICD-10-CM

## 2019-12-20 PROCEDURE — 99213 OFFICE O/P EST LOW 20 MIN: CPT | Mod: 25 | Performed by: NURSE PRACTITIONER

## 2019-12-20 PROCEDURE — 69209 REMOVE IMPACTED EAR WAX UNI: CPT | Mod: 50 | Performed by: NURSE PRACTITIONER

## 2019-12-20 ASSESSMENT — ENCOUNTER SYMPTOMS
MYALGIAS: 1
FEVER: 0
NAUSEA: 0
NECK PAIN: 1
DIARRHEA: 0
RHINORRHEA: 0
SORE THROAT: 0
LIGHT-HEADEDNESS: 1
COUGH: 0
HEADACHES: 1
FATIGUE: 1
VOMITING: 0

## 2019-12-20 ASSESSMENT — MIFFLIN-ST. JEOR: SCORE: 1325.88

## 2019-12-21 NOTE — PROGRESS NOTES
SUBJECTIVE:   Delio Agrawal is a 49 year old female presenting with a chief complaint of   Chief Complaint   Patient presents with     Urgent Care     Ear Problem     both ears are in pain, going on x 1 week for right ear but left started today.        She is an established patient of [facility].    Ear Pain    Onset of symptoms was 1 week(s) ago.  Course of illness is worsening.    Severity moderate  Current and Associated symptoms: ear pain bilateral, headache, body aches  Treatment measures tried include Tylenol/Ibuprofen.  Predisposing factors include None.      Review of Systems   Constitutional: Positive for fatigue. Negative for fever.   HENT: Positive for ear pain. Negative for congestion, ear discharge, rhinorrhea and sore throat.    Respiratory: Negative for cough.    Gastrointestinal: Negative for diarrhea, nausea and vomiting.   Musculoskeletal: Positive for myalgias and neck pain.   Neurological: Positive for light-headedness and headaches.       Past Medical History:   Diagnosis Date     Goiter      Subclinical hyperthyroidism      Family History   Problem Relation Age of Onset     Thyroid Disease Sister         surgery x 2     Thyroid Disease Maternal Aunt         surgery complicated by loss of voice.   days later     Thyroid Disease Other      Thyroid Disease Sister      Current Outpatient Medications   Medication Sig Dispense Refill     calcitRIOL (ROCALTROL) 0.5 MCG capsule Take 1 capsule (0.5 mcg) by mouth daily 90 capsule 3     calcium carbonate (TUMS) 500 MG chewable tablet Take 2 tablets (1,000 mg) by mouth daily 150 tablet 3     Cyanocobalamin (B-12) 1000 MCG TBCR Take 1,000 mcg by mouth daily 90 tablet 1     levothyroxine (SYNTHROID/LEVOTHROID) 112 MCG tablet Take 1 tablet (112 mcg) by mouth daily 90 tablet 3     vitamin D3 (CHOLECALCIFEROL) 1000 units (25 mcg) tablet Take 5 tablets (5,000 Units) by mouth daily       ibuprofen (ADVIL/MOTRIN) 800 MG tablet Take 1 tablet (800 mg) by mouth  "every 6 hours as needed for moderate pain 20 tablet 0     Social History     Tobacco Use     Smoking status: Never Smoker     Smokeless tobacco: Never Used   Substance Use Topics     Alcohol use: No       OBJECTIVE  /58   Pulse 80   Temp 97.7  F (36.5  C) (Oral)   Resp 15   Ht 1.651 m (5' 5\")   Wt 70 kg (154 lb 5.2 oz)   LMP 09/20/2019   Breastfeeding No   BMI 25.68 kg/m      Physical Exam  Vitals signs and nursing note reviewed.   Constitutional:       Appearance: Normal appearance. She is well-developed.   HENT:      Head: Normocephalic and atraumatic.      Right Ear: External ear normal.      Left Ear: External ear normal.      Ears:      Comments: Cerumen impaction bilaterally.      Nose: Nose normal.      Mouth/Throat:      Pharynx: Posterior oropharyngeal erythema present. No oropharyngeal exudate.   Eyes:      Extraocular Movements: Extraocular movements intact.      Conjunctiva/sclera: Conjunctivae normal.      Pupils: Pupils are equal, round, and reactive to light.   Neck:      Musculoskeletal: Normal range of motion and neck supple.   Cardiovascular:      Rate and Rhythm: Normal rate and regular rhythm.      Heart sounds: Normal heart sounds.   Pulmonary:      Effort: Pulmonary effort is normal.      Breath sounds: Normal breath sounds and air entry.   Lymphadenopathy:      Head:      Right side of head: No submandibular or tonsillar adenopathy.      Left side of head: No submandibular or tonsillar adenopathy.      Cervical: No cervical adenopathy.   Skin:     General: Skin is warm and dry.   Neurological:      Mental Status: She is alert and oriented to person, place, and time.   Psychiatric:         Behavior: Behavior is cooperative.           ASSESSMENT:      ICD-10-CM    1. Bilateral impacted cerumen H61.23 REMOVE IMPACTED CERUMEN        Medical Decision Making:    Differential Diagnosis: Cerumen impaction, otitis media, otitis externa, URI.     Serious Comorbid Conditions:  Adult:  " None    PLAN:  Cerumen impaction was cleared by nursing staff. No acute infections noted. Recommend to present to tylenol and ibuprofen to help with pain. Can do colace into ears to help soften ear wax. Education was added to AVS. Patient and spouse were agreeable to plan and verbalized understanding.     Followup:    If not improving or if condition worsens, follow up with your Primary Care Provider    Patient Instructions   Tylenol and ibuprofen to help with pain  Can do colace into ears to help soften ear wax in the future.

## 2019-12-21 NOTE — PATIENT INSTRUCTIONS
Tylenol and ibuprofen to help with pain  Can do colace into ears to help soften ear wax in the future.

## 2019-12-30 ENCOUNTER — OFFICE VISIT (OUTPATIENT)
Dept: OBGYN | Facility: CLINIC | Age: 49
End: 2019-12-30
Attending: OBSTETRICS & GYNECOLOGY
Payer: COMMERCIAL

## 2019-12-30 VITALS
DIASTOLIC BLOOD PRESSURE: 74 MMHG | HEIGHT: 60 IN | BODY MASS INDEX: 31.2 KG/M2 | HEART RATE: 69 BPM | WEIGHT: 158.9 LBS | SYSTOLIC BLOOD PRESSURE: 117 MMHG

## 2019-12-30 DIAGNOSIS — N95.1 PERIMENOPAUSAL SYMPTOMS: ICD-10-CM

## 2019-12-30 DIAGNOSIS — Z00.00 VISIT FOR PREVENTIVE HEALTH EXAMINATION: Primary | ICD-10-CM

## 2019-12-30 ASSESSMENT — PAIN SCALES - GENERAL: PAINLEVEL: NO PAIN (0)

## 2019-12-30 ASSESSMENT — MIFFLIN-ST. JEOR: SCORE: 1274.14

## 2019-12-30 ASSESSMENT — PATIENT HEALTH QUESTIONNAIRE - PHQ9: SUM OF ALL RESPONSES TO PHQ QUESTIONS 1-9: 3

## 2019-12-30 NOTE — PATIENT INSTRUCTIONS

## 2019-12-30 NOTE — LETTER
2019       RE: Delio Agrawal  [address]  [facility] [facility] [address]     Dear Colleague,    Thank you for referring your patient, Delio Agrawal, to the [facility] at [facility]. Please see a copy of my visit note below.      Progress Note    SUBJECTIVE:  Delio Agrawla is an 49 year old, , here with her spouse, who requests an Annual Preventive Exam.     Her last annual was in 2016; Pap and HPV were negative.  She desired pregnancy and fertility labs were drawn.  After that visit she had a total thyroidectomy for multinodular goiter with a small fragment of parathyroid tissue removed. She also had a benign breast biopsy 2017.     Menses were regular but are now more irregular every 30 to 40 days.  She does have menopausal symptoms of night sweats hot flashes vaginal dryness.  She does use her natural home grown olive oil from [place] for her vaginal dryness which is very helpful.  She does not have any problems sleeping.        Menstrual History:  Menstrual History 2019   LAST MENSTRUAL PERIOD 2019 -   Menarche Age - - 15   Period Cycle (Days) - - no period  in sept   Period Duration (Days) - - 6   Method of Contraception - - -   Period Pattern - - Irregular   Menstrual Flow - - Light   Menstrual Control - - -   Dysmenorrhea - - None   PMS Symptoms - - -   Reviewed Today - - Yes   Comments - - -       Last    Lab Results   Component Value Date    PAP OTHER-NIL, See Result 2016         Last   Lab Results   Component Value Date    HPV16 Negative 2016     Last   Lab Results   Component Value Date    HPV18 Negative 2016     Last   Lab Results   Component Value Date    HRHPV Negative 2016      Last Colonoscopy:  No results found for this or any previous visit.      HISTORY:  calcitRIOL (ROCALTROL) 0.5 MCG capsule, Take 1 capsule (0.5 mcg) by mouth daily  calcium  carbonate (TUMS) 500 MG chewable tablet, Take 2 tablets (1,000 mg) by mouth daily  Cyanocobalamin (B-12) 1000 MCG TBCR, Take 1,000 mcg by mouth daily  levothyroxine (SYNTHROID/LEVOTHROID) 112 MCG tablet, Take 1 tablet (112 mcg) by mouth daily  vitamin D3 (CHOLECALCIFEROL) 1000 units (25 mcg) tablet, Take 5 tablets (5,000 Units) by mouth daily  ibuprofen (ADVIL/MOTRIN) 800 MG tablet, Take 1 tablet (800 mg) by mouth every 6 hours as needed for moderate pain (Patient not taking: Reported on 2019)    No current facility-administered medications on file prior to visit.     Allergies   Allergen Reactions     Flexeril [Cyclobenzaprine] Shortness Of Breath     Immunization History   Administered Date(s) Administered     MMR 2014       OB History    Para Term  AB Living   0 0 0 0 0 0   SAB TAB Ectopic Multiple Live Births   0 0 0 0 0     Past Medical History:   Diagnosis Date     Goiter      Subclinical hyperthyroidism      Past Surgical History:   Procedure Laterality Date     THYROIDECTOMY N/A 2017    Procedure: THYROIDECTOMY;  Total Thyroidectomy of substernal goiter;  Surgeon: Keyla Griffin MD;  Location:  OR     Family History   Problem Relation Age of Onset     Thyroid Disease Sister         surgery x 2     Thyroid Disease Maternal Aunt         surgery complicated by loss of voice.   days later     Thyroid Disease Other      Thyroid Disease Sister      Social History     Socioeconomic History     Marital status:      Spouse name: None     Number of children: None     Years of education: None     Highest education level: None   Occupational History     None   Social Needs     Financial resource strain: None     Food insecurity:     Worry: None     Inability: None     Transportation needs:     Medical: None     Non-medical: None   Tobacco Use     Smoking status: Never Smoker     Smokeless tobacco: Never Used   Substance and Sexual Activity     Alcohol use: No     Drug  "use: No     Sexual activity: Yes     Partners: Male     Birth control/protection: None   Lifestyle     Physical activity:     Days per week: None     Minutes per session: None     Stress: None   Relationships     Social connections:     Talks on phone: None     Gets together: None     Attends Holiness service: None     Active member of club or organization: None     Attends meetings of clubs or organizations: None     Relationship status: None     Intimate partner violence:     Fear of current or ex partner: None     Emotionally abused: None     Physically abused: None     Forced sexual activity: None   Other Topics Concern     Parent/sibling w/ CABG, MI or angioplasty before 65F 55M? No   Social History Narrative    How much exercise per week? Walking a lot during the week.    How much calcium per day? Organic foods only tums       How much caffeine per day? 0    How much vitamin D per day? Organic foods only     Do you/your family wear seatbelts?  Yes    Do you/your family use safety helmets? No    Do you/your family use sunscreen? No    Do you/your family keep firearms in the home? No    Do you/your family have a smoke detector(s)? Yes        Do you feel safe in your home? Yes    Has anyone ever touched you in an unwanted manner? No        Reviewed cmckim lpn 12-2,163 mg (actual weight)-2019         ROS: numbness  PHQ-9 SCORE 12/30/2019   PHQ-9 Total Score 3     EXAM:  Blood pressure 117/74, pulse 69, height 1.535 m (5' 0.43\"), weight 72.1 kg (158 lb 14.4 oz), last menstrual period 09/23/2019, not currently breastfeeding. Body mass index is 30.59 kg/m .  General - pleasant female in no acute distress.  Skin - no suspicious lesions or rashes    Neck - supple without lymphadenopathy. Changes c/w recent surgery  Lungs - clear to auscultation bilaterally.  Heart - regular rate and rhythm without murmur.  Abdomen - soft, nontender, nondistended, no masses or organomegaly noted.  Musculoskeletal - no gross " deformities.  Neurological - normal mental status.    Breast Exam:  Breast: Without visible skin changes. No dimpling or lesions seen.   Breasts supple, non-tender with palpation, no dominant mass, nodularity, or nipple discharge noted bilaterally. Axillary nodes negative.      Pelvic Exam:  EG/BUS: Normal genital architecture without lesions, erythema or abnormal secretions Bartholin's, Urethra, [place]'s normal   Urethral meatus: normal   Urethra: no masses, tenderness, or scarring   Bladder: no masses or tenderness   Vagina: atrophic, thin, dry with absent  secretions  Cervix: no lesions  Uterus: small, smooth, firm, mobile w/o pain  Adnexa: Within normal limits and No masses, nodularity, tenderness    ASSESSMENT:  Encounter Diagnoses   Name Primary?     Visit for preventive health examination Yes     Perimenopausal symptoms       PLAN:   Offered HRT for symptoms; however she states she is doing well. RTO 1 year.    Lady Green MD

## 2019-12-30 NOTE — PROGRESS NOTES
Progress Note    SUBJECTIVE:  Delio Agrawal is an 49 year old, , here with her spouse, who requests an Annual Preventive Exam.     Her last annual was in 2016; Pap and HPV were negative.  She desired pregnancy and fertility labs were drawn.  After that visit she had a total thyroidectomy for multinodular goiter with a small fragment of parathyroid tissue removed. She also had a benign breast biopsy 2017.     Menses were regular but are now more irregular every 30 to 40 days.  She does have menopausal symptoms of night sweats hot flashes vaginal dryness.  She does use her natural home grown olive oil from [place] for her vaginal dryness which is very helpful.  She does not have any problems sleeping.        Menstrual History:  Menstrual History 2019   LAST MENSTRUAL PERIOD 2019 -   Menarche Age - - 15   Period Cycle (Days) - - no period  in sept   Period Duration (Days) - - 6   Method of Contraception - - -   Period Pattern - - Irregular   Menstrual Flow - - Light   Menstrual Control - - -   Dysmenorrhea - - None   PMS Symptoms - - -   Reviewed Today - - Yes   Comments - - -       Last    Lab Results   Component Value Date    PAP OTHER-NIL, See Result 2016         Last   Lab Results   Component Value Date    HPV16 Negative 2016     Last   Lab Results   Component Value Date    HPV18 Negative 2016     Last   Lab Results   Component Value Date    HRHPV Negative 2016      Last Colonoscopy:  No results found for this or any previous visit.      HISTORY:  calcitRIOL (ROCALTROL) 0.5 MCG capsule, Take 1 capsule (0.5 mcg) by mouth daily  calcium carbonate (TUMS) 500 MG chewable tablet, Take 2 tablets (1,000 mg) by mouth daily  Cyanocobalamin (B-12) 1000 MCG TBCR, Take 1,000 mcg by mouth daily  levothyroxine (SYNTHROID/LEVOTHROID) 112 MCG tablet, Take 1 tablet (112 mcg) by mouth daily  vitamin D3 (CHOLECALCIFEROL) 1000 units (25 mcg)  tablet, Take 5 tablets (5,000 Units) by mouth daily  ibuprofen (ADVIL/MOTRIN) 800 MG tablet, Take 1 tablet (800 mg) by mouth every 6 hours as needed for moderate pain (Patient not taking: Reported on 2019)    No current facility-administered medications on file prior to visit.     Allergies   Allergen Reactions     Flexeril [Cyclobenzaprine] Shortness Of Breath     Immunization History   Administered Date(s) Administered     MMR 2014       OB History    Para Term  AB Living   0 0 0 0 0 0   SAB TAB Ectopic Multiple Live Births   0 0 0 0 0     Past Medical History:   Diagnosis Date     Goiter      Subclinical hyperthyroidism      Past Surgical History:   Procedure Laterality Date     THYROIDECTOMY N/A 2017    Procedure: THYROIDECTOMY;  Total Thyroidectomy of substernal goiter;  Surgeon: Keyla Griffin MD;  Location:  OR     Family History   Problem Relation Age of Onset     Thyroid Disease Sister         surgery x 2     Thyroid Disease Maternal Aunt         surgery complicated by loss of voice.   days later     Thyroid Disease Other      Thyroid Disease Sister      Social History     Socioeconomic History     Marital status:      Spouse name: None     Number of children: None     Years of education: None     Highest education level: None   Occupational History     None   Social Needs     Financial resource strain: None     Food insecurity:     Worry: None     Inability: None     Transportation needs:     Medical: None     Non-medical: None   Tobacco Use     Smoking status: Never Smoker     Smokeless tobacco: Never Used   Substance and Sexual Activity     Alcohol use: No     Drug use: No     Sexual activity: Yes     Partners: Male     Birth control/protection: None   Lifestyle     Physical activity:     Days per week: None     Minutes per session: None     Stress: None   Relationships     Social connections:     Talks on phone: None     Gets together: None     Attends  "Mormon service: None     Active member of club or organization: None     Attends meetings of clubs or organizations: None     Relationship status: None     Intimate partner violence:     Fear of current or ex partner: None     Emotionally abused: None     Physically abused: None     Forced sexual activity: None   Other Topics Concern     Parent/sibling w/ CABG, MI or angioplasty before 65F 55M? No   Social History Narrative    How much exercise per week? Walking a lot during the week.    How much calcium per day? Organic foods only tums       How much caffeine per day? 0    How much vitamin D per day? Organic foods only     Do you/your family wear seatbelts?  Yes    Do you/your family use safety helmets? No    Do you/your family use sunscreen? No    Do you/your family keep firearms in the home? No    Do you/your family have a smoke detector(s)? Yes        Do you feel safe in your home? Yes    Has anyone ever touched you in an unwanted manner? No        Reviewed cmckim lpn 12-2,163 mg (actual weight)-2019                ROS: numbness  PHQ-9 SCORE 12/30/2019   PHQ-9 Total Score 3           EXAM:  Blood pressure 117/74, pulse 69, height 1.535 m (5' 0.43\"), weight 72.1 kg (158 lb 14.4 oz), last menstrual period 09/23/2019, not currently breastfeeding. Body mass index is 30.59 kg/m .  General - pleasant female in no acute distress.  Skin - no suspicious lesions or rashes    Neck - supple without lymphadenopathy. Changes c/w recent surgery  Lungs - clear to auscultation bilaterally.  Heart - regular rate and rhythm without murmur.  Abdomen - soft, nontender, nondistended, no masses or organomegaly noted.  Musculoskeletal - no gross deformities.  Neurological - normal mental status.    Breast Exam:  Breast: Without visible skin changes. No dimpling or lesions seen.   Breasts supple, non-tender with palpation, no dominant mass, nodularity, or nipple discharge noted bilaterally. Axillary nodes negative.      Pelvic " Exam:  EG/BUS: Normal genital architecture without lesions, erythema or abnormal secretions Bartholin's, Urethra, [place]'s normal   Urethral meatus: normal   Urethra: no masses, tenderness, or scarring   Bladder: no masses or tenderness   Vagina: atrophic, thin, dry with absent  secretions  Cervix: no lesions  Uterus: small, smooth, firm, mobile w/o pain  Adnexa: Within normal limits and No masses, nodularity, tenderness         ASSESSMENT:  Encounter Diagnoses   Name Primary?     Visit for preventive health examination Yes     Perimenopausal symptoms         PLAN:     Offered HRT for symptoms; however she states she is doing well. RTO 1 year.

## 2020-03-10 ENCOUNTER — HEALTH MAINTENANCE LETTER (OUTPATIENT)
Age: 50
End: 2020-03-10

## 2020-04-07 DIAGNOSIS — E89.0 POSTSURGICAL HYPOTHYROIDISM: ICD-10-CM

## 2020-04-07 DIAGNOSIS — E83.51 HYPOCALCEMIA: ICD-10-CM

## 2020-04-07 RX ORDER — CALCITRIOL 0.5 UG/1
0.5 CAPSULE, LIQUID FILLED ORAL DAILY
Qty: 90 CAPSULE | Refills: 4 | Status: SHIPPED | OUTPATIENT
Start: 2020-04-07 | End: 2021-06-25

## 2020-04-21 ENCOUNTER — TELEPHONE (OUTPATIENT)
Dept: ENDOCRINOLOGY | Facility: CLINIC | Age: 50
End: 2020-04-21

## 2020-04-21 DIAGNOSIS — E89.0 POSTSURGICAL HYPOTHYROIDISM: ICD-10-CM

## 2020-04-21 RX ORDER — LEVOTHYROXINE SODIUM 112 UG/1
112 TABLET ORAL DAILY
Qty: 90 TABLET | Refills: 3 | OUTPATIENT
Start: 2020-04-21

## 2020-04-24 ENCOUNTER — TELEPHONE (OUTPATIENT)
Dept: ENDOCRINOLOGY | Facility: CLINIC | Age: 50
End: 2020-04-24

## 2020-04-24 DIAGNOSIS — E89.0 POSTSURGICAL HYPOTHYROIDISM: ICD-10-CM

## 2020-04-24 RX ORDER — LEVOTHYROXINE SODIUM 112 UG/1
112 TABLET ORAL DAILY
Qty: 90 TABLET | Refills: 3 | Status: SHIPPED | OUTPATIENT
Start: 2020-04-24 | End: 2021-04-19

## 2020-04-24 NOTE — TELEPHONE ENCOUNTER
levothyroxine (SYNTHROID/LEVOTHROID) 112 MCG tablet       Last Written Prescription Date:  4-16-19  Last Fill Quantity: 90,   # refills: 3  Last Office Visit : 4-16-19  Future Office visit:  4-    Routing refill request to provider for review/approval because:  Provider preference.         Kathleen M Doege RN

## 2020-04-24 NOTE — TELEPHONE ENCOUNTER
[facility] Call Center    Phone Message    May a detailed message be left on voicemail: yes     Reason for Call: Medication Refill Request    Has the patient contacted the pharmacy for the refill? Yes   Name of medication being requested: levothyroxine (SYNTHROID/LEVOTHROID) 112 MCG tablet   Provider who prescribed the medication: Dr Proctor  Pharmacy: [place], [place] - [address]  Date medication is needed: ASAP         Action Taken: Message routed to:  [facility] ([facility]): endocrinology    Travel Screening: Not Applicable

## 2020-04-27 ENCOUNTER — VIRTUAL VISIT (OUTPATIENT)
Dept: ENDOCRINOLOGY | Facility: CLINIC | Age: 50
End: 2020-04-27
Payer: COMMERCIAL

## 2020-04-27 DIAGNOSIS — E89.2 POST-SURGICAL HYPOPARATHYROIDISM (H): Primary | ICD-10-CM

## 2020-04-27 DIAGNOSIS — E89.0 POSTSURGICAL HYPOTHYROIDISM: ICD-10-CM

## 2020-04-27 NOTE — PROGRESS NOTES
"Delio Agrawal is a 49 year old female who is being evaluated via a billable telephone visit.      The patient has been notified of following:     \"This telephone visit will be conducted via a call between you and your physician/provider. We have found that certain health care needs can be provided without the need for a physical exam.  This service lets us provide the care you need with a short phone conversation.  If a prescription is necessary we can send it directly to your pharmacy.  If lab work is needed we can place an order for that and you can then stop by our lab to have the test done at a later time.    Telephone visits are billed at different rates depending on your insurance coverage. During this emergency period, for some insurers they may be billed the same as an in-person visit.  Please reach out to your insurance provider with any questions.    If during the course of the call the physician/provider feels a telephone visit is not appropriate, you will not be charged for this service.\"    Patient has given verbal consent for Telephone visit?  Yes    How would you like to obtain your AVS? Mail a copy      Deb Stanley MA        "

## 2020-04-27 NOTE — PROGRESS NOTES
Endocrinology Attending Physician telephone visit Progress note    Attending Assessment/Plan :     Post-surgical hypoparathyroidism (H)  continues over 1 year out from surgery.   Treatment includes calcitriol and calcium.  Labs today and every 6 months.Treat to low normal or slightly below normal target calcium.   Next labs in about one month due to Corona virus risks     Postsurgical hypothyroidism  Treat to normal target TSH.   Currently on LT4 112 mcg/day  Check TSH reflex approx once/year or sooner prn    Due to the COVID 19 pandemic this visit was converted to a telephone visit in order to help prevent spread of infection in this high risk patient and the general population. The patient gave verbal consent for the telephone visit today.    Start time  1424   Stop time  1430  Total time  6 minutes   This visit would have been billed as 25023 as an E & M code    Birdie Proctor MD      [facility]/ HISTORY OF PRESENT ILLNESS Delio returns  for followup of postsurgical hypothyroidism, postoperative hypocalcemia.      On 12/1/17 she was treated with total thyroidectomy removing 139 gram multinodular goiter.  There was a small fragment of parathyroid tissue on the left lobectomy specimen.  Postoperative PTH was < 3 on 12/2, 12/1, 1/5/18 , < 7 on 3/6/18, 7 on 10/15/18 with calcium 7.4. .      I harlan saw her 4/2019.       Today she reports she is taking:  LT4 112 mcg/day  Calcitriol 0.5 mcg/day  Calcium carbonate 500 2 tablets once/day  Vitamin D 5000 international unit(s)/day    She has no symptoms as long as she takes her medication.  If she stops the calcium then her face is numb.     REVIEW OF SYSTEMS  With the current regimen she has no symptoms   Weight is currently 71.4 2 months ago; now 68.4 kg  Wants to lose weight  Cardiac: negative  Respiratory:   GI: negative  Menopause- LMP Septembr, 2019  + hot flashes/ night sweats - not too bad    Past Medical History  Past Medical History:   Diagnosis Date     Goiter       "Subclinical hyperthyroidism      Medications  Current Outpatient Medications   Medication Sig Dispense Refill     calcitRIOL (ROCALTROL) 0.5 MCG capsule Take 1 capsule (0.5 mcg) by mouth daily 90 capsule 4     calcium carbonate (TUMS) 500 MG chewable tablet Take 2 tablets (1,000 mg) by mouth daily 150 tablet 3     Cyanocobalamin (B-12) 1000 MCG TBCR Take 1,000 mcg by mouth daily 90 tablet 1     ibuprofen (ADVIL/MOTRIN) 800 MG tablet Take 1 tablet (800 mg) by mouth every 6 hours as needed for moderate pain (Patient not taking: Reported on 12/30/2019) 20 tablet 0     levothyroxine (SYNTHROID/LEVOTHROID) 112 MCG tablet Take 1 tablet (112 mcg) by mouth daily 90 tablet 3     vitamin D3 (CHOLECALCIFEROL) 1000 units (25 mcg) tablet Take 5 tablets (5,000 Units) by mouth daily        Allergies  Allergies   Allergen Reactions     Flexeril [Cyclobenzaprine] Shortness Of Breath       Family History  family history includes Thyroid Disease in her maternal aunt, sister, sister, and another family member.     Social History    Social History     Tobacco Use     Smoking status: Never Smoker     Smokeless tobacco: Never Used   Substance Use Topics     Alcohol use: No     Drug use: No     From [place].  is Tracy, who works in the OR.   She is mostly at home.      EXAM:  GENERAL middle aged woman inNAD  There were no vitals taken for this visit.  BP Readings from Last 1 Encounters:   12/30/19 117/74      Pulse Readings from Last 1 Encounters:   12/30/19 69      Resp Readings from Last 1 Encounters:   12/20/19 15      Temp Readings from Last 1 Encounters:   12/20/19 97.7  F (36.5  C) (Oral)      SpO2 Readings from Last 1 Encounters:   06/29/19 98%      Wt Readings from Last 1 Encounters:   12/30/19 72.1 kg (158 lb 14.4 oz)      Ht Readings from Last 1 Encounters:   12/30/19 1.535 m (5' 0.43\")       DATA REVIEW     Ref. Range 4/16/2019 16:37 5/3/2019 09:30   Creatinine Latest Ref Range: 0.52 - 1.04 mg/dL 0.75    GFR Estimate " Latest Ref Range: >60 mL/min/1.73_m2 >90    GFR Estimate If Black Latest Ref Range: >60 mL/min/1.73_m2 >90    Calcium Latest Ref Range: 8.5 - 10.1 mg/dL 8.2 (L)    Phosphorus Latest Ref Range: 2.5 - 4.5 mg/dL 3.8    Calcium Urine g/24 h Latest Ref Range: 0.10 - 0.30 g/24 h  0.14   Calcium Urine g/g Cr Latest Units: g/g Cr  0.22   Calcium Urine mg/dL Latest Units: mg/dL  5.2   Creatinine Urine Latest Units: mg/dL  24   TSH Latest Ref Range: 0.40 - 4.00 mU/L 1.44

## 2020-06-01 DIAGNOSIS — E89.2 POST-SURGICAL HYPOPARATHYROIDISM (H): ICD-10-CM

## 2020-06-01 DIAGNOSIS — E89.0 POSTSURGICAL HYPOTHYROIDISM: ICD-10-CM

## 2020-06-01 LAB
CALCIUM SERPL-MCNC: 8.2 MG/DL (ref 8.5–10.1)
CREAT SERPL-MCNC: 0.75 MG/DL (ref 0.52–1.04)
GFR SERPL CREATININE-BSD FRML MDRD: >90 ML/MIN/{1.73_M2}
PHOSPHATE SERPL-MCNC: 4.2 MG/DL (ref 2.5–4.5)
TSH SERPL DL<=0.005 MIU/L-ACNC: 2.58 MU/L (ref 0.4–4)

## 2020-06-25 NOTE — TELEPHONE ENCOUNTER
"Pt is a 65 y.o. female with past medical history of CAD, T2DM, HFpEF, PVD, Breast Cancer s/p R mastectomy who presented as transfer for biopsy of RLL lung mass after presenting to [facility] with hemoptysis. Patient originally presented with hemoptysis 6/6. She had undergone debridement of her R breast in March for fat necrosis and was being followed by wound care; home health noted her hemoptysis and told to present to ED which she did. Per chart review, at the time of presentation she noted hemoptysis for 3 months, but denied fever, night sweats, purulent sputum, or weight loss. She was also complaining of back pain at that time. While hospitalized she was treated for MRSA bacteremia and MRSA UTI. 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 While hospitalized she was treated for MRSA bacteremia and MRSA UTI. She underwent ISHMAEL which was negative for vegetations. On 6/15 patient Hb trended down to 6.3 and she received 2 u pRBCs, but patient was otherwise hemodynamically stable. She was evaluated by pulmonary and underwent bronchoscopy, with bronchial brushings negative for malignant cells. Decision was made to transfer patient to [facility] to pursue biopsy via EUS.     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 She was also noted to have epidural c/t/l spine abscess treated with 8 weeks vancomycin."     [facility] called by  for massive hemoptysis with marked hypoxia. Pt emergently intubated with plans for bronchoscopy and STAT CTA chest for possible IR intervention.      Hospital/ICU Course per chart review:  "-- 6/17 - Admitted to " Spoke to Delio's  (per patient request) about the benign findings from her breast biopsy done last week.  We discussed the Radiologist's recommendation of a 6-month follow up mammogram of her right breast.  Patient's  verbalized understanding and did not have any additional questions or concerns at this time.     "Hospital Medicine for hemoptysis  -- AES performed Bx of mediastinal mass, preliminary results suggestive of abscess.   -- BCx grew MRSA. ID consulted, recommended MRI C/T/L (showed osteo-discitis of spine)   -- US R breast (which showed possible abscess).  -- IR consulted but abscess doesn't have drainable pocket.   -- Upon admission, US showed L iliac-femoral vein DVT and was started on heparin gtt.   -- Transitioned to apixaban.   -- H/H stable since admission.   -- 6/19 - developed acute hypoxemic respiratory failure requiring 10L HFNC.   -- Started on IV lasix with improvement of symptoms. Weaned off supplemental O2.  -- 6/23 ~ 1am - pt developed massive hematemesis with hypoxia. Transferred to [facility]"       "

## 2020-12-20 ENCOUNTER — HEALTH MAINTENANCE LETTER (OUTPATIENT)
Age: 50
End: 2020-12-20

## 2021-02-28 ENCOUNTER — HEALTH MAINTENANCE LETTER (OUTPATIENT)
Age: 51
End: 2021-02-28

## 2021-03-11 ENCOUNTER — OFFICE VISIT (OUTPATIENT)
Dept: URGENT CARE | Facility: URGENT CARE | Age: 51
End: 2021-03-11
Payer: COMMERCIAL

## 2021-03-11 VITALS
HEIGHT: 61 IN | BODY MASS INDEX: 28.3 KG/M2 | DIASTOLIC BLOOD PRESSURE: 60 MMHG | SYSTOLIC BLOOD PRESSURE: 112 MMHG | RESPIRATION RATE: 14 BRPM | HEART RATE: 70 BPM | WEIGHT: 149.91 LBS | TEMPERATURE: 97.9 F

## 2021-03-11 DIAGNOSIS — N30.00 ACUTE CYSTITIS WITHOUT HEMATURIA: Primary | ICD-10-CM

## 2021-03-11 DIAGNOSIS — M54.50 ACUTE LEFT-SIDED LOW BACK PAIN WITHOUT SCIATICA: ICD-10-CM

## 2021-03-11 LAB
ALBUMIN UR-MCNC: NEGATIVE MG/DL
APPEARANCE UR: CLEAR
BILIRUB UR QL STRIP: NEGATIVE
COLOR UR AUTO: YELLOW
GLUCOSE UR STRIP-MCNC: NEGATIVE MG/DL
HGB UR QL STRIP: ABNORMAL
KETONES UR STRIP-MCNC: NEGATIVE MG/DL
LEUKOCYTE ESTERASE UR QL STRIP: ABNORMAL
NITRATE UR QL: NEGATIVE
NON-SQ EPI CELLS #/AREA URNS LPF: ABNORMAL /LPF
PH UR STRIP: 7 PH (ref 5–7)
RBC #/AREA URNS AUTO: ABNORMAL /HPF
SOURCE: ABNORMAL
SP GR UR STRIP: 1.01 (ref 1–1.03)
UROBILINOGEN UR STRIP-ACNC: 0.2 EU/DL (ref 0.2–1)
WBC #/AREA URNS AUTO: ABNORMAL /HPF

## 2021-03-11 PROCEDURE — 81001 URINALYSIS AUTO W/SCOPE: CPT | Performed by: FAMILY MEDICINE

## 2021-03-11 PROCEDURE — 99213 OFFICE O/P EST LOW 20 MIN: CPT | Performed by: PHYSICIAN ASSISTANT

## 2021-03-11 RX ORDER — NITROFURANTOIN 25; 75 MG/1; MG/1
100 CAPSULE ORAL 2 TIMES DAILY
Qty: 10 CAPSULE | Refills: 0 | Status: SHIPPED | OUTPATIENT
Start: 2021-03-11 | End: 2021-03-16

## 2021-03-11 ASSESSMENT — MIFFLIN-ST. JEOR: SCORE: 1237.38

## 2021-03-18 ENCOUNTER — OFFICE VISIT (OUTPATIENT)
Dept: URGENT CARE | Facility: URGENT CARE | Age: 51
End: 2021-03-18
Payer: COMMERCIAL

## 2021-03-18 VITALS
DIASTOLIC BLOOD PRESSURE: 78 MMHG | TEMPERATURE: 97.8 F | BODY MASS INDEX: 28.13 KG/M2 | HEIGHT: 61 IN | WEIGHT: 149 LBS | RESPIRATION RATE: 12 BRPM | SYSTOLIC BLOOD PRESSURE: 108 MMHG | HEART RATE: 60 BPM

## 2021-03-18 DIAGNOSIS — R10.32 LLQ ABDOMINAL PAIN: ICD-10-CM

## 2021-03-18 DIAGNOSIS — M79.2 NEUROPATHIC PAIN: ICD-10-CM

## 2021-03-18 DIAGNOSIS — R30.0 DYSURIA: Primary | ICD-10-CM

## 2021-03-18 LAB
ALBUMIN UR-MCNC: NEGATIVE MG/DL
APPEARANCE UR: CLEAR
BASOPHILS # BLD AUTO: 0 10E9/L (ref 0–0.2)
BASOPHILS NFR BLD AUTO: 0.2 %
BILIRUB UR QL STRIP: NEGATIVE
COLOR UR AUTO: YELLOW
DIFFERENTIAL METHOD BLD: NORMAL
EOSINOPHIL # BLD AUTO: 0.1 10E9/L (ref 0–0.7)
EOSINOPHIL NFR BLD AUTO: 1.1 %
ERYTHROCYTE [DISTWIDTH] IN BLOOD BY AUTOMATED COUNT: 12.4 % (ref 10–15)
GLUCOSE UR STRIP-MCNC: NEGATIVE MG/DL
HCT VFR BLD AUTO: 40.4 % (ref 35–47)
HGB BLD-MCNC: 13.4 G/DL (ref 11.7–15.7)
HGB UR QL STRIP: NEGATIVE
KETONES UR STRIP-MCNC: NEGATIVE MG/DL
LEUKOCYTE ESTERASE UR QL STRIP: NEGATIVE
LIPASE SERPL-CCNC: 125 U/L (ref 73–393)
LYMPHOCYTES # BLD AUTO: 1.5 10E9/L (ref 0.8–5.3)
LYMPHOCYTES NFR BLD AUTO: 31.8 %
MCH RBC QN AUTO: 30.5 PG (ref 26.5–33)
MCHC RBC AUTO-ENTMCNC: 33.2 G/DL (ref 31.5–36.5)
MCV RBC AUTO: 92 FL (ref 78–100)
MONOCYTES # BLD AUTO: 0.5 10E9/L (ref 0–1.3)
MONOCYTES NFR BLD AUTO: 10.2 %
NEUTROPHILS # BLD AUTO: 2.7 10E9/L (ref 1.6–8.3)
NEUTROPHILS NFR BLD AUTO: 56.7 %
NITRATE UR QL: NEGATIVE
PH UR STRIP: 7 PH (ref 5–7)
PLATELET # BLD AUTO: 188 10E9/L (ref 150–450)
RBC # BLD AUTO: 4.39 10E12/L (ref 3.8–5.2)
SOURCE: NORMAL
SP GR UR STRIP: 1.01 (ref 1–1.03)
SPECIMEN SOURCE: NORMAL
UROBILINOGEN UR STRIP-ACNC: 0.2 EU/DL (ref 0.2–1)
WBC # BLD AUTO: 4.7 10E9/L (ref 4–11)
WET PREP SPEC: NORMAL

## 2021-03-18 PROCEDURE — 36415 COLL VENOUS BLD VENIPUNCTURE: CPT | Performed by: NURSE PRACTITIONER

## 2021-03-18 PROCEDURE — 85025 COMPLETE CBC W/AUTO DIFF WBC: CPT | Performed by: NURSE PRACTITIONER

## 2021-03-18 PROCEDURE — 87210 SMEAR WET MOUNT SALINE/INK: CPT | Performed by: NURSE PRACTITIONER

## 2021-03-18 PROCEDURE — 99215 OFFICE O/P EST HI 40 MIN: CPT | Performed by: NURSE PRACTITIONER

## 2021-03-18 PROCEDURE — 80053 COMPREHEN METABOLIC PANEL: CPT | Performed by: NURSE PRACTITIONER

## 2021-03-18 PROCEDURE — 83690 ASSAY OF LIPASE: CPT | Performed by: NURSE PRACTITIONER

## 2021-03-18 PROCEDURE — 81003 URINALYSIS AUTO W/O SCOPE: CPT | Performed by: NURSE PRACTITIONER

## 2021-03-18 RX ORDER — IBUPROFEN 800 MG/1
800 TABLET, FILM COATED ORAL
Qty: 21 TABLET | Refills: 0 | Status: SHIPPED | OUTPATIENT
Start: 2021-03-18 | End: 2021-03-25

## 2021-03-18 ASSESSMENT — MIFFLIN-ST. JEOR: SCORE: 1233.24

## 2021-03-18 NOTE — PROGRESS NOTES
Chief Complaint   Patient presents with     Urgent Care     Pain     diagnosed with bladder infection last week but today having pain in lower abdomen and left leg.          ICD-10-CM    1. Dysuria  R30.0 *UA reflex to Microscopic and Culture ([place] and [facility] (except [place] and [place])   2. Neuropathic pain  M79.2 ibuprofen (ADVIL/MOTRIN) 800 MG tablet   3. LLQ abdominal pain  R10.32 CBC with platelets and differential     Comprehensive metabolic panel (BMP + Alb, Alk Phos, ALT, AST, Total. Bili, TP)     Lipase     CANCELED: CBC with platelets and differential   Plan will be to send patient home with ibuprofen for back and pain that is radiating down legs. They will monitor the abdominal discomfort and if it increases or she develops fever or other concerning symptoms she is instructed to go to the emergency room for further work-up. According to urinalysis urinary tract infection has cleared and there is no signs of vaginitis. Patient understands if symptoms do not resolve with ibuprofen she should see primary care doctor next week.    52  minutes spent on the date of the encounter doing chart review, history and exam, documentation and further activities as noted above    Medical Decision Making    Differential Diagnosis:  Abdominal Pain: Constipation, GB/Cholelithiasis, GERD/Ulcer, Diverticular Disease, UTI, Pyelonephritis, Ovarian Cyst, Kidney Stone, Bowel Obstruction, Pancreatitis, Hepatitis, Viral Gastroenteritis, vaginitis, and appendicitis.    The pain radiating from her and buttock down into the leg is likely a neuropathic pain, differential would include herniated disc, sciatica, discitis, disc herniation/pathology, radiculopathy, neuropathic pain, fracture, sprain, congenital abnormalities, cauda equina syndrome, abscess    Flank pain radiating into groin may be related to renal stones but this is ruled out with no hematuria.        Results for orders placed or performed in visit on  03/18/21 (from the past 24 hour(s))   *UA reflex to Microscopic and Culture ([place] and [facility] (except [place] and [place])    Specimen: Midstream Urine   Result Value Ref Range    Color Urine Yellow     Appearance Urine Clear     Glucose Urine Negative NEG^Negative mg/dL    Bilirubin Urine Negative NEG^Negative    Ketones Urine Negative NEG^Negative mg/dL    Specific Gravity Urine 1.010 1.003 - 1.035    Blood Urine Negative NEG^Negative    pH Urine 7.0 5.0 - 7.0 pH    Protein Albumin Urine Negative NEG^Negative mg/dL    Urobilinogen Urine 0.2 0.2 - 1.0 EU/dL    Nitrite Urine Negative NEG^Negative    Leukocyte Esterase Urine Negative NEG^Negative    Source Midstream Urine    Wet prep    Specimen: Vagina   Result Value Ref Range    Specimen Description Vagina     Wet Prep Few  WBC'S seen       Wet Prep No Trichomonas seen     Wet Prep No clue cells seen     Wet Prep No yeast seen    CBC with platelets and differential   Result Value Ref Range    WBC 4.7 4.0 - 11.0 10e9/L    RBC Count 4.39 3.8 - 5.2 10e12/L    Hemoglobin 13.4 11.7 - 15.7 g/dL    Hematocrit 40.4 35.0 - 47.0 %    MCV 92 78 - 100 fl    MCH 30.5 26.5 - 33.0 pg    MCHC 33.2 31.5 - 36.5 g/dL    RDW 12.4 10.0 - 15.0 %    Platelet Count 188 150 - 450 10e9/L    Diff Method Automated Method     % Neutrophils 56.7 %    % Lymphocytes 31.8 %    % Monocytes 10.2 %    % Eosinophils 1.1 %    % Basophils 0.2 %    Absolute Neutrophil 2.7 1.6 - 8.3 10e9/L    Absolute Lymphocytes 1.5 0.8 - 5.3 10e9/L    Absolute Monocytes 0.5 0.0 - 1.3 10e9/L    Absolute Eosinophils 0.1 0.0 - 0.7 10e9/L    Absolute Basophils 0.0 0.0 - 0.2 10e9/L       Subjective     Delio Agrawal is an 50 year oldfemale who presents to clinic today for urinary urgency and frequency.  She was treated for a urinary tract infection last week with 5 days of nitrofurantoin and symptoms improved but never cleared completely.  Urine culture was not completed.  Today she developed a pain from her  "left flank into the groin area.  She has been dealing with some pain radiating from the buttock down to the leg and inner thigh for about a month and this discomfort is different than that.  Last bowel movement was this morning and normal consistency. LMP almost a year ago.     accompanied by her spouse:     ROS: 10 point ROS neg other than the symptoms noted above in the HPI.       Objective    /78   Pulse 60   Temp 97.8  F (36.6  C) (Oral)   Resp 12   Ht 1.549 m (5' 1\")   Wt 67.6 kg (149 lb)   LMP 09/23/2019   BMI 28.15 kg/m      Physical Exam       GENERAL APPEARANCE: alert and mild distress     EYES: PERRL, EOMI, sclera non-icteric     HENT: oral exam benign, mucus membranes intact, without ulcers or lesions     NECK: no adenopathy or asymmetry, thyroid normal to palpation     RESP: lungs clear to auscultation - no rales, rhonchi or wheezes     CV: regular rates and rhythm, no murmurs, rubs, or gallop     ABDOMEN: Normal bowel sounds, abdomen is soft, she does have slight tenderness in the left lower quadrant and suprapubic area     MS: extremities normal- no gross deformities noted; normal muscle tone. Negative straight leg raising pain     SKIN: no suspicious lesions or rashes     NEURO: Normal strength and tone, mentation intact and speech normal, DTRs intact    Patient Instructions   Go to ER if you develop fever or worsening abdominal pain.            CHANDRAKANT Matta, CNP  [facility] Provider        "

## 2021-03-19 LAB
ALBUMIN SERPL-MCNC: 3.9 G/DL (ref 3.4–5)
ALP SERPL-CCNC: 55 U/L (ref 40–150)
ALT SERPL W P-5'-P-CCNC: 24 U/L (ref 0–50)
ANION GAP SERPL CALCULATED.3IONS-SCNC: 4 MMOL/L (ref 3–14)
AST SERPL W P-5'-P-CCNC: 25 U/L (ref 0–45)
BILIRUB SERPL-MCNC: 0.3 MG/DL (ref 0.2–1.3)
BUN SERPL-MCNC: 12 MG/DL (ref 7–30)
CALCIUM SERPL-MCNC: 8.1 MG/DL (ref 8.5–10.1)
CHLORIDE SERPL-SCNC: 105 MMOL/L (ref 94–109)
CO2 SERPL-SCNC: 28 MMOL/L (ref 20–32)
CREAT SERPL-MCNC: 0.68 MG/DL (ref 0.52–1.04)
GFR SERPL CREATININE-BSD FRML MDRD: >90 ML/MIN/{1.73_M2}
GLUCOSE SERPL-MCNC: 84 MG/DL (ref 70–99)
POTASSIUM SERPL-SCNC: 3.8 MMOL/L (ref 3.4–5.3)
PROT SERPL-MCNC: 8.1 G/DL (ref 6.8–8.8)
SODIUM SERPL-SCNC: 137 MMOL/L (ref 133–144)

## 2021-04-19 DIAGNOSIS — E89.0 POSTSURGICAL HYPOTHYROIDISM: ICD-10-CM

## 2021-04-19 RX ORDER — LEVOTHYROXINE SODIUM 112 UG/1
112 TABLET ORAL DAILY
Qty: 90 TABLET | Refills: 3 | Status: CANCELLED | OUTPATIENT
Start: 2021-04-19

## 2021-04-19 RX ORDER — LEVOTHYROXINE SODIUM 112 UG/1
112 TABLET ORAL DAILY
Qty: 90 TABLET | Refills: 4 | Status: SHIPPED | OUTPATIENT
Start: 2021-04-19 | End: 2022-06-15

## 2021-04-19 NOTE — TELEPHONE ENCOUNTER
levothyroxine (SYNTHROID/LEVOTHROID) 112 MCG tablet      Last Written Prescription Date:  4-  Last Fill Quantity: 90,   # refills: 3  Last Office Visit : 4-  Future Office visit:  11-    Routing refill request to provider for review/approval because:  Provider preference.         Kathleen M Doege RN

## 2021-06-22 ENCOUNTER — OFFICE VISIT (OUTPATIENT)
Dept: FAMILY MEDICINE | Facility: CLINIC | Age: 51
End: 2021-06-22
Payer: COMMERCIAL

## 2021-06-22 VITALS
WEIGHT: 151 LBS | BODY MASS INDEX: 28.53 KG/M2 | TEMPERATURE: 98 F | DIASTOLIC BLOOD PRESSURE: 78 MMHG | SYSTOLIC BLOOD PRESSURE: 111 MMHG | HEART RATE: 66 BPM | OXYGEN SATURATION: 100 %

## 2021-06-22 DIAGNOSIS — E89.2 POST-SURGICAL HYPOPARATHYROIDISM (H): ICD-10-CM

## 2021-06-22 DIAGNOSIS — R10.13 ABDOMINAL PAIN, EPIGASTRIC: ICD-10-CM

## 2021-06-22 DIAGNOSIS — E89.0 POSTSURGICAL HYPOTHYROIDISM: ICD-10-CM

## 2021-06-22 LAB
ERYTHROCYTE [DISTWIDTH] IN BLOOD BY AUTOMATED COUNT: 13 % (ref 10–15)
HCT VFR BLD AUTO: 38.5 % (ref 35–47)
HGB BLD-MCNC: 12.9 G/DL (ref 11.7–15.7)
MCH RBC QN AUTO: 30.7 PG (ref 26.5–33)
MCHC RBC AUTO-ENTMCNC: 33.5 G/DL (ref 31.5–36.5)
MCV RBC AUTO: 92 FL (ref 78–100)
PLATELET # BLD AUTO: 195 10E9/L (ref 150–450)
RBC # BLD AUTO: 4.2 10E12/L (ref 3.8–5.2)
WBC # BLD AUTO: 5 10E9/L (ref 4–11)

## 2021-06-22 PROCEDURE — 84100 ASSAY OF PHOSPHORUS: CPT | Performed by: FAMILY MEDICINE

## 2021-06-22 PROCEDURE — 80053 COMPREHEN METABOLIC PANEL: CPT | Performed by: FAMILY MEDICINE

## 2021-06-22 PROCEDURE — 36415 COLL VENOUS BLD VENIPUNCTURE: CPT | Performed by: FAMILY MEDICINE

## 2021-06-22 PROCEDURE — 99214 OFFICE O/P EST MOD 30 MIN: CPT | Performed by: FAMILY MEDICINE

## 2021-06-22 PROCEDURE — 84443 ASSAY THYROID STIM HORMONE: CPT | Performed by: FAMILY MEDICINE

## 2021-06-22 PROCEDURE — 82306 VITAMIN D 25 HYDROXY: CPT | Performed by: FAMILY MEDICINE

## 2021-06-22 PROCEDURE — 85027 COMPLETE CBC AUTOMATED: CPT | Performed by: FAMILY MEDICINE

## 2021-06-22 RX ORDER — OMEPRAZOLE 20 MG/1
20 TABLET, DELAYED RELEASE ORAL DAILY
Qty: 14 TABLET | Refills: 0 | Status: SHIPPED | OUTPATIENT
Start: 2021-06-22 | End: 2021-07-06

## 2021-06-22 NOTE — PROGRESS NOTES
Assessment & Plan     1. Post-surgical hypoparathyroidism (H)  - ordered below routine labs  - TSH with free T4 reflex  - Comprehensive metabolic panel (BMP + Alb, Alk Phos, ALT, AST, Total. Bili, TP)  - Phosphorus  - CBC with platelets  - Vitamin D Deficiency    2. Postsurgical hypothyroidism  - ordered below routine labs  - TSH with free T4 reflex  - Comprehensive metabolic panel (BMP + Alb, Alk Phos, ALT, AST, Total. Bili, TP)  - Phosphorus  - CBC with platelets  - Vitamin D Deficiency    3. Abdominal pain, epigastric  - d/d gastritis vs PUD vs h.pylori vs other   - discussed lifestyle modifications  - ordered below for further evaluation; tx as indicated   - recommended trial of prilosec   - Helicobacter pylori Antigen Stool; Future  - omeprazole (PRILOSEC OTC) 20 MG EC tablet; Take 1 tablet (20 mg) by mouth daily for 14 days  Dispense: 14 tablet; Refill: 0      Addendum -   RN, can you please inform pt that calcium was low and I would recommend restarting calcitriol and calcium carbonate. Her thyroid levels are normal. Her phosphorous levels are slightly elevated. Is she on OTC supplements? Recheck levels in one week, lab order placed.   Thanks!  ELIA PALACIO RN, can you please inform pt that h.pylori was positive.  That is likely causing her symptoms, she would need to increase Prilosec from 20 mg daily to twice daily along with clarithromycin and amoxicillin. Treatment course is for 2 weeks and she would need to repeat h.pylori test four weeks after completion of treatment to ensure infection eradicated.   Thanks!  DM    Return in about 2 weeks (around 7/6/2021) for sympotms are not improving.    Elia Palacio MD  [facility]    Melissa Kebede is a 51 year old who presents for the following health issues     HPI       S/p hypoparathyroidism.   She stopped taking calcititrol as that caused facial flushing. LMP was two years ago. Nine months ago she stopped taking  calcitorol and facial flushing improved.   Since surgery she has had numbness in her right finger.  Last week she started feeling something stuck in her throat. No throat pain.   She wants to check her calcium levels checked. She stopped taking tums as that upset her stomach.   Two months ago she felt burning in her epigastric region towards mid-sternum. She was taking OTC Prilosec for two weeks and pain improved. Pain has returned over the last two days since stopping the medication. No nausea, vomiting, constipation or diarrhea.   She still has hot flashes.      Review of Systems         Objective    /78 (BP Location: Right arm, Patient Position: Chair, Cuff Size: Adult Regular)   Pulse 66   Temp 98  F (36.7  C) (Tympanic)   Wt 68.5 kg (151 lb)   LMP 09/23/2019   SpO2 100%   BMI 28.53 kg/m    Body mass index is 28.53 kg/m .  Physical Exam

## 2021-06-23 ENCOUNTER — TELEPHONE (OUTPATIENT)
Dept: FAMILY MEDICINE | Facility: CLINIC | Age: 51
End: 2021-06-23

## 2021-06-23 DIAGNOSIS — R10.13 ABDOMINAL PAIN, EPIGASTRIC: ICD-10-CM

## 2021-06-23 LAB
ALBUMIN SERPL-MCNC: 4.1 G/DL (ref 3.4–5)
ALP SERPL-CCNC: 51 U/L (ref 40–150)
ALT SERPL W P-5'-P-CCNC: 27 U/L (ref 0–50)
ANION GAP SERPL CALCULATED.3IONS-SCNC: 3 MMOL/L (ref 3–14)
AST SERPL W P-5'-P-CCNC: 27 U/L (ref 0–45)
BILIRUB SERPL-MCNC: 0.3 MG/DL (ref 0.2–1.3)
BUN SERPL-MCNC: 12 MG/DL (ref 7–30)
CALCIUM SERPL-MCNC: 7.2 MG/DL (ref 8.5–10.1)
CHLORIDE SERPL-SCNC: 105 MMOL/L (ref 94–109)
CO2 SERPL-SCNC: 30 MMOL/L (ref 20–32)
CREAT SERPL-MCNC: 0.77 MG/DL (ref 0.52–1.04)
DEPRECATED CALCIDIOL+CALCIFEROL SERPL-MC: 86 UG/L (ref 20–75)
GFR SERPL CREATININE-BSD FRML MDRD: 89 ML/MIN/{1.73_M2}
GLUCOSE SERPL-MCNC: 77 MG/DL (ref 70–99)
PHOSPHATE SERPL-MCNC: 5.4 MG/DL (ref 2.5–4.5)
POTASSIUM SERPL-SCNC: 4.1 MMOL/L (ref 3.4–5.3)
PROT SERPL-MCNC: 7.9 G/DL (ref 6.8–8.8)
SODIUM SERPL-SCNC: 138 MMOL/L (ref 133–144)
TSH SERPL DL<=0.005 MIU/L-ACNC: 0.69 MU/L (ref 0.4–4)

## 2021-06-23 PROCEDURE — 87338 HPYLORI STOOL AG IA: CPT | Performed by: FAMILY MEDICINE

## 2021-06-23 NOTE — TELEPHONE ENCOUNTER
RN, can you please inform pt that calcium was low and I would recommend restarting calcitriol and calcium carbonate. Her thyroid levels are normal. Her phosphorous levels are slightly elevated. Is she on OTC supplements? Recheck levels in one week, lab order placed.   Thanks!  ELIA Kebede has an appt with you 11/23/21.  During our visit she did endorse upper extremity paresthesias which have been present since her surgery. Due to menopausal symptoms she started experiencing facial flushing which she attributes to calcitriol. She stopped medication nine months ago along with her calcium carbonate. Her calcium levels are low, phosphorous elevated and normal thyroid. I have advised her to restart both medication and recheck levels in one week. Do you have any other recommendations?  Thank you!  Dr. Palacio

## 2021-06-24 LAB — H PYLORI AG STL QL IA: POSITIVE

## 2021-06-24 NOTE — TELEPHONE ENCOUNTER
Spoke with  but unable to give results d/t no NEWTON on file. He will go home and be with wife and call back for results.  I did advise that this is not an emergency.     Thanks!     Maliha Bal RN

## 2021-06-24 NOTE — TELEPHONE ENCOUNTER
Calcium 500mg every day  Calcium 333mg every day  Still has the calcitriol   Lab appointment made for next week.   Pt in agreement with plan and verbalized understanding.  Thanks!  Maliha Bal RN  Triage Nurse

## 2021-06-25 DIAGNOSIS — E83.51 HYPOCALCEMIA: ICD-10-CM

## 2021-06-25 DIAGNOSIS — E89.0 POSTSURGICAL HYPOTHYROIDISM: ICD-10-CM

## 2021-06-25 RX ORDER — CALCITRIOL 0.5 UG/1
0.5 CAPSULE, LIQUID FILLED ORAL DAILY
Qty: 90 CAPSULE | Refills: 4 | Status: SHIPPED | OUTPATIENT
Start: 2021-06-25 | End: 2021-11-23

## 2021-06-28 ENCOUNTER — TELEPHONE (OUTPATIENT)
Dept: FAMILY MEDICINE | Facility: CLINIC | Age: 51
End: 2021-06-28

## 2021-06-28 DIAGNOSIS — A04.8 HELICOBACTER PYLORI (H. PYLORI) INFECTION: Primary | ICD-10-CM

## 2021-06-28 RX ORDER — AMOXICILLIN 500 MG/1
1000 CAPSULE ORAL 2 TIMES DAILY
Qty: 56 CAPSULE | Refills: 0 | Status: SHIPPED | OUTPATIENT
Start: 2021-06-28 | End: 2021-07-12

## 2021-06-28 RX ORDER — CLARITHROMYCIN 500 MG
500 TABLET ORAL 2 TIMES DAILY
Qty: 28 TABLET | Refills: 0 | Status: SHIPPED | OUTPATIENT
Start: 2021-06-28 | End: 2021-07-12

## 2021-06-28 NOTE — TELEPHONE ENCOUNTER
RN, can you please inform pt that h.pylori was positive.  That is likely causing her symptoms, she would need to increase Prilosec from 20 mg daily to twice daily along with clarithromycin and amoxicillin. Treatment course is for 2 weeks and she would need to repeat h.pylori test four weeks after completion of treatment to ensure infection eradicated.   Thanks!  DM

## 2021-06-29 NOTE — TELEPHONE ENCOUNTER
Went over message with Delio and  Jayy. Jayy will  medications today and lab appt made to follow up in 4 weeks. 7/30/2021. They will if has questions after getting the medications.    Maranda Madera RN

## 2021-09-16 ENCOUNTER — OFFICE VISIT (OUTPATIENT)
Dept: URGENT CARE | Facility: URGENT CARE | Age: 51
End: 2021-09-16
Payer: COMMERCIAL

## 2021-09-16 VITALS
DIASTOLIC BLOOD PRESSURE: 63 MMHG | HEART RATE: 75 BPM | WEIGHT: 151 LBS | TEMPERATURE: 97.9 F | BODY MASS INDEX: 28.53 KG/M2 | SYSTOLIC BLOOD PRESSURE: 96 MMHG | OXYGEN SATURATION: 100 %

## 2021-09-16 DIAGNOSIS — R30.0 DYSURIA: Primary | ICD-10-CM

## 2021-09-16 DIAGNOSIS — M54.50 ACUTE LEFT-SIDED LOW BACK PAIN WITHOUT SCIATICA: ICD-10-CM

## 2021-09-16 DIAGNOSIS — R21 RASH: ICD-10-CM

## 2021-09-16 LAB
ALBUMIN UR-MCNC: NEGATIVE MG/DL
APPEARANCE UR: CLEAR
BACTERIA #/AREA URNS HPF: ABNORMAL /HPF
BILIRUB UR QL STRIP: NEGATIVE
COLOR UR AUTO: YELLOW
GLUCOSE UR STRIP-MCNC: NEGATIVE MG/DL
HGB UR QL STRIP: ABNORMAL
KETONES UR STRIP-MCNC: NEGATIVE MG/DL
LEUKOCYTE ESTERASE UR QL STRIP: NEGATIVE
NITRATE UR QL: NEGATIVE
PH UR STRIP: 5.5 [PH] (ref 5–7)
RBC #/AREA URNS AUTO: ABNORMAL /HPF
SP GR UR STRIP: 1.02 (ref 1–1.03)
SQUAMOUS #/AREA URNS AUTO: ABNORMAL /LPF
UROBILINOGEN UR STRIP-ACNC: 0.2 E.U./DL
WBC #/AREA URNS AUTO: ABNORMAL /HPF

## 2021-09-16 PROCEDURE — 99213 OFFICE O/P EST LOW 20 MIN: CPT | Performed by: FAMILY MEDICINE

## 2021-09-16 PROCEDURE — 81001 URINALYSIS AUTO W/SCOPE: CPT | Performed by: FAMILY MEDICINE

## 2021-09-16 NOTE — PROGRESS NOTES
Assessment & Plan     Dysuria  - UA Macro with Reflex to Micro and Culture - lab collect  - UA Macro with Reflex to Micro and Culture - lab collect  - Urine Microscopic    Acute left-sided low back pain without sciatica   Treatment for  Uncomplicated back sprain: ibuprofen, topical analgesics/creams/remedies and or/ heat compresses. F/u as needed if symptoms worsen.     Rash   Left lower abdomen an early appearing rash as it is itchy recommend 1% HC cream -- not consistent with shingles    Kwasi Craig MD   [place]    Melissa Kebede is a 51 year old female who presents to clinic today for the following health issues:  Chief Complaint   Patient presents with     Urgent Care     Back Pain     c/o back pain for 1 week       HPI   She reports no fevers. No obvious blood in urine. No pain with urination. No new urinary frequency    She reports left sided lower back discomfort that is sometimes aggravated when she wakes up in the morning -- movement doesn't always aggravate. The pain migrates in this left side.   No hx of kidney stones.      No hx of back injuries.   She has some discomfort that radiates down the left lateral leg area    No weakness of extremities.     Patient Active Problem List    Diagnosis Date Noted     Post-surgical hypoparathyroidism (H) 04/16/2019     Priority: Medium     12/1/17  total thyroidectomy removing 139 gram multinodular goiter.  There was a small fragment of parathyroid tissue on the left lobectomy specimen.      Postoperative PTH was < 3 on 12/2, 12/1, 1/5/18 , < 7 on 3/6/18, 7 on 10/15/18 with calcium 7.4. .        Hypocalcemia 04/20/2018     Priority: Medium     Postsurgical hypothyroidism 12/02/2017     Priority: Medium     12/1/17 treated with total thyroidectomy removing 139 gram multinodular goiter.       Status post total thyroidectomy 12/01/2017     Priority: Medium     CARDIOVASCULAR SCREENING; LDL GOAL LESS THAN 160 04/03/2015     Priority: Medium      History of BCG vaccination 04/03/2015     Priority: Medium       Current Outpatient Medications   Medication     calcitRIOL (ROCALTROL) 0.5 MCG capsule     calcium carbonate (TUMS) 500 MG chewable tablet     Cyanocobalamin (B-12) 1000 MCG TBCR     ibuprofen (ADVIL/MOTRIN) 800 MG tablet     levothyroxine (SYNTHROID/LEVOTHROID) 112 MCG tablet     vitamin D3 (CHOLECALCIFEROL) 1000 units (25 mcg) tablet     oxyCODONE-acetaminophen (PERCOCET) 5-325 MG tablet     predniSONE (DELTASONE) 20 MG tablet     valACYclovir (VALTREX) 1000 mg tablet     No current facility-administered medications for this visit.           Objective    BP 96/63   Pulse 75   Temp 97.9  F (36.6  C) (Tympanic)   Wt 68.5 kg (151 lb)   LMP 09/23/2019   SpO2 100%   BMI 28.53 kg/m    Physical Exam   Back: negative straight leg test bilaterally, forward flexion to 30 degrees without pain, extension to 25-30 degrees causes mild discomfort at reported area of low back    Abd: lower abdomen faint rash 2 isolated lesions with slight erythematous base without drainage    Results for orders placed or performed in visit on 09/16/21   UA Macro with Reflex to Micro and Culture - lab collect     Status: Abnormal    Specimen: Urine, Midstream   Result Value Ref Range    Color Urine Yellow Colorless, Straw, Light Yellow, Yellow    Appearance Urine Clear Clear    Glucose Urine Negative Negative mg/dL    Bilirubin Urine Negative Negative    Ketones Urine Negative Negative mg/dL    Specific Gravity Urine 1.025 1.003 - 1.035    Blood Urine Moderate (A) Negative    pH Urine 5.5 5.0 - 7.0    Protein Albumin Urine Negative Negative mg/dL    Urobilinogen Urine 0.2 0.2, 1.0 E.U./dL    Nitrite Urine Negative Negative    Leukocyte Esterase Urine Negative Negative   Urine Microscopic     Status: Abnormal   Result Value Ref Range    Bacteria Urine None Seen None Seen /HPF    RBC Urine 2-5 (A) 0-2 /HPF /HPF    WBC Urine 0-5 0-5 /HPF /HPF    Squamous Epithelials Urine Few  (A) None Seen /LPF    Narrative    Urine Culture not indicated                     The use of Dragon/[place] dictation services may have been used to construct the content in this note; any grammatical or spelling errors are non-intentional. Please contact the author of this note directly if you are in need of any clarification.

## 2021-09-16 NOTE — PATIENT INSTRUCTIONS
-Topical icy/hot or voltaren gel to sore muscles of the back  -Ibuprofen 400mg every 4 hours as needed for back pain  -Can also try heat compresses intermittently    For the rash on left lower abdomen if itchy use topical steroid cream (hydrocortisone)     If the rash is worse or painful/spreading then return to be evaluated

## 2021-09-18 ENCOUNTER — HOSPITAL ENCOUNTER (EMERGENCY)
Facility: CLINIC | Age: 51
Discharge: HOME | End: 2021-09-18
Attending: EMERGENCY MEDICINE | Admitting: EMERGENCY MEDICINE
Payer: COMMERCIAL

## 2021-09-18 VITALS
SYSTOLIC BLOOD PRESSURE: 111 MMHG | OXYGEN SATURATION: 100 % | DIASTOLIC BLOOD PRESSURE: 73 MMHG | TEMPERATURE: 97.2 F | RESPIRATION RATE: 16 BRPM | HEART RATE: 86 BPM

## 2021-09-18 DIAGNOSIS — B02.9 HERPES ZOSTER WITHOUT COMPLICATION: ICD-10-CM

## 2021-09-18 PROCEDURE — 99284 EMERGENCY DEPT VISIT MOD MDM: CPT | Performed by: EMERGENCY MEDICINE

## 2021-09-18 PROCEDURE — 99283 EMERGENCY DEPT VISIT LOW MDM: CPT

## 2021-09-18 RX ORDER — OXYCODONE AND ACETAMINOPHEN 5; 325 MG/1; MG/1
1-2 TABLET ORAL EVERY 4 HOURS PRN
Qty: 12 TABLET | Refills: 0 | Status: SHIPPED | OUTPATIENT
Start: 2021-09-18 | End: 2022-06-06

## 2021-09-18 RX ORDER — VALACYCLOVIR HYDROCHLORIDE 1 G/1
1000 TABLET, FILM COATED ORAL 3 TIMES DAILY
Qty: 21 TABLET | Refills: 0 | Status: SHIPPED | OUTPATIENT
Start: 2021-09-18 | End: 2022-07-27

## 2021-09-18 RX ORDER — PREDNISONE 20 MG/1
TABLET ORAL
Qty: 18 TABLET | Refills: 0 | Status: SHIPPED | OUTPATIENT
Start: 2021-09-18 | End: 2021-09-28

## 2021-09-18 ASSESSMENT — ENCOUNTER SYMPTOMS
NAUSEA: 0
DYSURIA: 0
HEADACHES: 0
LIGHT-HEADEDNESS: 0
FLANK PAIN: 1
HEMATURIA: 0
FEVER: 0
BACK PAIN: 1
COLOR CHANGE: 0
ARTHRALGIAS: 0
ADENOPATHY: 0
SHORTNESS OF BREATH: 0
CONFUSION: 0
CHILLS: 0
EYE REDNESS: 0
DIFFICULTY URINATING: 0
ABDOMINAL PAIN: 0
BRUISES/BLEEDS EASILY: 0
NECK STIFFNESS: 0
VOMITING: 0
POLYDIPSIA: 0

## 2021-09-18 NOTE — ED TRIAGE NOTES
Pt complaining of low back pain for the past week with no known injury.  Pt states she was seen at  recently and told it was not an infection but the pain meds are not working.

## 2021-09-18 NOTE — ED PROVIDER NOTES
[facility] EMERGENCY DEPARTMENT ([facility])    9/18/21     ED 4  10:12 AM   History     Chief Complaint   Patient presents with     Back Pain     The history is provided by the spouse, the patient and medical records.     Delio Agrawal is a 51 year old female who presents with back pain for the past week. Patient had presented to urgent care 2 days ago complaining of left sided back pain and dysuria. She was evaluated with urinalysis that showed presence of blood in the urine but no leukocyte esterase, bacteria, WBCs/hpf or other signs of UTI. She was discharged with instructions to use ibuprofen, hot compresses, IcyHot or Voltaren gel for back pain. She presents to the Emergency Department today with back pain. She states the pain has been waxing and waning.  states he has tried giving her exercises for sciatic nerve pinch, massages, etc without improvement. The pain was very bad and so he brought her here for evaluation. She denies fevers, vomiting. No diarrhea or difficulty voiding. Pain is focal in left back, burning and sharp, mild to moderate, and radiates towards left low flank. Nothing makes the symptoms better or worse.  At urgent care it was noted she had a subtle rash on left flank but now there's more lesions. The rash is very painful, states it started appearing after onset of pain. No history of back surgery. No falls or injuries.      Past Medical History  Past Medical History:   Diagnosis Date     Goiter      Subclinical hyperthyroidism      Past Surgical History:   Procedure Laterality Date     THYROIDECTOMY N/A 12/1/2017    Procedure: THYROIDECTOMY;  Total Thyroidectomy of substernal goiter;  Surgeon: Keyla Griffin MD;  Location: [facility]     oxyCODONE-acetaminophen (PERCOCET) 5-325 MG tablet  predniSONE (DELTASONE) 20 MG tablet  valACYclovir (VALTREX) 1000 mg tablet  calcitRIOL (ROCALTROL) 0.5 MCG capsule  calcium carbonate (TUMS) 500 MG chewable tablet  Cyanocobalamin  (B-12) 1000 MCG TBCR  ibuprofen (ADVIL/MOTRIN) 800 MG tablet  levothyroxine (SYNTHROID/LEVOTHROID) 112 MCG tablet  vitamin D3 (CHOLECALCIFEROL) 1000 units (25 mcg) tablet      Allergies   Allergen Reactions     Flexeril [Cyclobenzaprine] Shortness Of Breath     Family History  Family History   Problem Relation Age of Onset     Thyroid Disease Sister         surgery x 2     Thyroid Disease Maternal Aunt         surgery complicated by loss of voice.   days later     Thyroid Disease Other      Thyroid Disease Sister      Social History   Social History     Tobacco Use     Smoking status: Never Smoker     Smokeless tobacco: Never Used   Substance Use Topics     Alcohol use: No     Drug use: No      Past medical history, past surgical history, medications, allergies, family history, and social history were reviewed with the patient. No additional pertinent items.       Review of Systems   Constitutional: Negative for chills and fever.   HENT: Negative for congestion.    Eyes: Negative for redness.   Respiratory: Negative for shortness of breath.    Cardiovascular: Negative for chest pain.   Gastrointestinal: Negative for abdominal pain, nausea and vomiting.   Endocrine: Negative for polydipsia and polyuria.   Genitourinary: Positive for flank pain. Negative for decreased urine volume, difficulty urinating, dysuria, hematuria, pelvic pain and vaginal bleeding.   Musculoskeletal: Positive for back pain. Negative for arthralgias and neck stiffness.   Skin: Positive for rash. Negative for color change.   Allergic/Immunologic: Negative for immunocompromised state.   Neurological: Negative for light-headedness and headaches.   Hematological: Negative for adenopathy. Does not bruise/bleed easily.   Psychiatric/Behavioral: Negative for confusion.     A complete review of systems was performed with pertinent positives and negatives noted in the HPI, and all other systems negative.    Physical Exam   BP: 123/71  Pulse:  86  Temp: 97.2  F (36.2  C)  Resp: 16  SpO2: 100 %  Physical Exam  Vitals and nursing note reviewed.   Constitutional:       General: She is not in acute distress.     Appearance: Normal appearance. She is normal weight. She is not ill-appearing, toxic-appearing or diaphoretic.   HENT:      Head: Normocephalic and atraumatic.      Mouth/Throat:      Pharynx: No oropharyngeal exudate.   Eyes:      General: No scleral icterus.     Extraocular Movements: Extraocular movements intact.      Conjunctiva/sclera: Conjunctivae normal.   Cardiovascular:      Rate and Rhythm: Normal rate.      Pulses: Normal pulses.      Heart sounds: Normal heart sounds.   Pulmonary:      Effort: Pulmonary effort is normal. No respiratory distress.      Breath sounds: Normal breath sounds.   Abdominal:      Palpations: Abdomen is soft.      Tenderness: There is no abdominal tenderness. There is no right CVA tenderness, left CVA tenderness, guarding or rebound.   Musculoskeletal:         General: No tenderness. Normal range of motion.      Cervical back: Normal range of motion and neck supple.   Skin:     General: Skin is warm.      Findings: Rash present.      Comments: There are vesicular lesions in left flank on erythematous base.  Lesions are slightly tender to palpation.  No ulcerations.  No midline cervical, thoracic, or lumbar spinous process tenderness.   Neurological:      General: No focal deficit present.      Mental Status: She is alert and oriented to person, place, and time.      Cranial Nerves: No cranial nerve deficit.      Coordination: Coordination normal.      Gait: Gait normal.   Psychiatric:         Mood and Affect: Mood normal.         Behavior: Behavior normal.         Thought Content: Thought content normal.         Judgment: Judgment normal.         ED Course      Procedures         No results found for any visits on 09/18/21.  Medications - No data to display     Assessments & Plan (with Medical Decision Making)    51-year-old woman presenting with left flank pain for several days with new onset of rash earlier this morning with exacerbation of the pain.  Patient's physical exam is consistent with acute shingles outbreak.  Vesicular lesions are noted in dermatomal pattern, similar to her describing the pain.  At this point she is stable for discharge with prescriptions for valacyclovir, prednisone taper, and Percocet.  She will follow with her primary care physician in several days for reassessment and return to the emergency department if her symptoms worsen.  She and her  agree with the plan.    I have reviewed the nursing notes. I have reviewed the findings, diagnosis, plan and need for follow up with the patient.    Discharge Medication List as of 9/18/2021 10:28 AM      START taking these medications    Details   oxyCODONE-acetaminophen (PERCOCET) 5-325 MG tablet Take 1-2 tablets by mouth every 4 hours as needed for pain, Disp-12 tablet, R-0, E-Prescribe      predniSONE (DELTASONE) 20 MG tablet 3 tabs day 1, then 2.5 tabs days 2-3, then 2 tabs days 4-5, then 1.5 tabs days 6-7, then 1 tab days 8-9, then 1/2 tab day 10., Disp-18 tablet, R-0, E-Prescribe      valACYclovir (VALTREX) 1000 mg tablet Take 1 tablet (1,000 mg) by mouth 3 times daily for 7 days, Disp-21 tablet, R-0, E-Prescribe             Final diagnoses:   Herpes zoster without complication     Genna CARLSON, am serving as a trained medical scribe to document services personally performed by Reinaldo Sow MD, based on the provider's statements to me.  This document has been checked and approved by the attending provider.     Reinaldo CARLSON MD, was physically present and have reviewed and verified the accuracy of this note documented by Genna Ortez, medical scribe.    Former[facility] EMERGENCY DEPARTMENT  9/18/2021     Magali Sow MD  09/18/21 [id number]

## 2021-09-18 NOTE — DISCHARGE INSTRUCTIONS
Please make an appointment to follow up with Your Primary Care Provider in 2-3 days for further evaluation and recommendations.  Please take the prescribed medications as instructed.  If your symptoms worsen, your pain is not well controlled, or you develop a fever 100.4  F or higher please come back to the emergency department.

## 2021-10-03 ENCOUNTER — HEALTH MAINTENANCE LETTER (OUTPATIENT)
Age: 51
End: 2021-10-03

## 2021-11-01 ENCOUNTER — NURSE TRIAGE (OUTPATIENT)
Dept: FAMILY MEDICINE | Facility: CLINIC | Age: 51
End: 2021-11-01

## 2021-11-01 NOTE — TELEPHONE ENCOUNTER
Patient calling reporting left lower abdominal pain that radiates to the right side. Patient states the pain is mild and comes and goes. Appt made with Dr. Palacio on Friday, however RN advised patient call back if symptoms worsen, or may go straight to urgent care with worsening pain. Patient agreed with plan and wishes to wait until Friday.     Reason for Disposition    MODERATE OR MILD pain that comes and goes (cramps) lasts > 24 hours    Additional Information    Negative: Passed out (i.e., fainted, collapsed and was not responding)    Negative: Shock suspected (e.g., cold/pale/clammy skin, too weak to stand, low BP, rapid pulse)    Negative: Sounds like a life-threatening emergency to the triager    Negative: Chest pain    Negative: Pain is mainly in upper abdomen (if needed ask: 'is it mainly above the belly button?')    Negative: Abdominal pain and pregnant > 20 weeks    Negative: Abdominal pain and pregnant < 20 weeks    Negative: SEVERE abdominal pain (e.g., excruciating)    Negative: Vomiting red blood or black (coffee ground) material    Negative: Bloody, black, or tarry bowel movements (Exception: chronic-unchanged black-grey bowel movements and is taking iron pills or Pepto-bismol)    Negative: Constant abdominal pain lasting > 2 hours    Negative: Vomiting bile (green color)    Negative: Patient sounds very sick or weak to the triager    Negative: Vomiting and abdomen looks much more swollen than usual    Negative: White of the eyes have turned yellow (i.e., jaundice)    Negative: Blood in urine (red, pink, or tea-colored)    Negative: Fever > 103 F (39.4 C)    Negative: Fever > 101 F (38.3 C) and over 60 years of age    Negative: Fever > 100.0 F (37.8 C) and has diabetes mellitus or a weak immune system (e.g., HIV positive, cancer chemotherapy, organ transplant, splenectomy, chronic steroids)    Negative: Fever > 100.0 F (37.8 C) and bedridden (e.g., nursing home patient, stroke, chronic illness,  "recovering from surgery)    Negative: Pregnant or could be pregnant (i.e., missed last menstrual period)    Answer Assessment - Initial Assessment Questions  1. LOCATION: \"Where does it hurt?\"       Left lower side, sometimes radiates to the right  2. RADIATION: \"Does the pain shoot anywhere else?\" (e.g., chest, back)      Starts on left side of abdomen and radiates to the right  3. ONSET: \"When did the pain begin?\" (e.g., minutes, hours or days ago)       1 week  4. SUDDEN: \"Gradual or sudden onset?\"      gradual  5. PATTERN \"Does the pain come and go, or is it constant?\"     - If constant: \"Is it getting better, staying the same, or worsening?\"       (Note: Constant means the pain never goes away completely; most serious pain is constant and it progresses)      - If intermittent: \"How long does it last?\" \"Do you have pain now?\"      (Note: Intermittent means the pain goes away completely between bouts)      Intermittent, he pain comes and goes  6. SEVERITY: \"How bad is the pain?\"  (e.g., Scale 1-10; mild, moderate, or severe)    - MILD (1-3): doesn't interfere with normal activities, abdomen soft and not tender to touch     - MODERATE (4-7): interferes with normal activities or awakens from sleep, tender to touch     - SEVERE (8-10): excruciating pain, doubled over, unable to do any normal activities       Mild  7. RECURRENT SYMPTOM: \"Have you ever had this type of abdominal pain before?\" If so, ask: \"When was the last time?\" and \"What happened that time?\"       No  8. CAUSE: \"What do you think is causing the abdominal pain?\"      Had shingles recently  9. RELIEVING/AGGRAVATING FACTORS: \"What makes it better or worse?\" (e.g., movement, antacids, bowel movement)      If patient hold urine for a long time, pain gets worse  10. OTHER SYMPTOMS: \"Has there been any vomiting, diarrhea, constipation, or urine problems?\"        No problem  11. PREGNANCY: \"Is there any chance you are pregnant?\" \"When was your last " "menstrual period?\"        No    Protocols used: ABDOMINAL PAIN - FEMALE-A-OH    BRAYAN VicenteN RN  [facility]    "

## 2021-11-05 ENCOUNTER — OFFICE VISIT (OUTPATIENT)
Dept: FAMILY MEDICINE | Facility: CLINIC | Age: 51
End: 2021-11-05
Payer: COMMERCIAL

## 2021-11-05 VITALS
WEIGHT: 153 LBS | OXYGEN SATURATION: 100 % | HEART RATE: 67 BPM | TEMPERATURE: 98.9 F | DIASTOLIC BLOOD PRESSURE: 75 MMHG | BODY MASS INDEX: 28.91 KG/M2 | SYSTOLIC BLOOD PRESSURE: 112 MMHG

## 2021-11-05 DIAGNOSIS — Z86.19 HISTORY OF HELICOBACTER PYLORI INFECTION: Primary | ICD-10-CM

## 2021-11-05 DIAGNOSIS — Z86.19 HISTORY OF SHINGLES: ICD-10-CM

## 2021-11-05 DIAGNOSIS — R14.0 ABDOMINAL BLOATING: ICD-10-CM

## 2021-11-05 PROCEDURE — 99214 OFFICE O/P EST MOD 30 MIN: CPT | Performed by: FAMILY MEDICINE

## 2021-11-05 NOTE — PROGRESS NOTES
Assessment & Plan     1. History of Helicobacter pylori infection  - ordered h.pylori to ensure eradication of infection; tx as indicated  - Helicobacter pylori Antigen Stool; Future    2. Abdominal bloating  - h/o shingles however symptoms are not c/w postherpetic neuralgia   - recommended OTC simethicone (GasX)    3. History of shingles        Gilbert Palacio MD  [facility] PELON Kebede is a 51 year old who presents for the following health issues     HPI     Abdominal/Flank Pain  H/o h.pylori and symptoms improved with tx.  H/o shingles 9/18/21.   She has pain in the site of the shingles.   She has been experiencing pain at the site of the shingles vaccine.   She has sharp pain in the left upper and lower quadrant. Pain is intermittent, no aggravating/relieving factors and relieved with Bismuth.   She is passing a lot of gas.   BM are regular, no constipation or diarrhea.   No dysuria, vaginal discharge or itching.   Symptoms improve with passing gas.           Review of Systems         Objective    LMP 09/23/2019   There is no height or weight on file to calculate BMI.  Physical Exam

## 2021-11-08 ENCOUNTER — LAB (OUTPATIENT)
Dept: LAB | Facility: CLINIC | Age: 51
End: 2021-11-08
Payer: COMMERCIAL

## 2021-11-08 DIAGNOSIS — Z86.19 HISTORY OF HELICOBACTER PYLORI INFECTION: ICD-10-CM

## 2021-11-08 PROCEDURE — 87338 HPYLORI STOOL AG IA: CPT

## 2021-11-09 LAB — H PYLORI AG STL QL IA: NEGATIVE

## 2021-11-23 ENCOUNTER — VIRTUAL VISIT (OUTPATIENT)
Dept: ENDOCRINOLOGY | Facility: CLINIC | Age: 51
End: 2021-11-23
Payer: COMMERCIAL

## 2021-11-23 DIAGNOSIS — E89.0 POSTSURGICAL HYPOTHYROIDISM: ICD-10-CM

## 2021-11-23 DIAGNOSIS — R68.89 THROAT SYMPTOM: ICD-10-CM

## 2021-11-23 DIAGNOSIS — E89.2 POST-SURGICAL HYPOPARATHYROIDISM (H): Primary | ICD-10-CM

## 2021-11-23 PROCEDURE — 99214 OFFICE O/P EST MOD 30 MIN: CPT | Mod: 95

## 2021-11-23 NOTE — PROGRESS NOTES
Endocrinology  note    Attending Assessment/Plan :     Post-surgical hypoparathyroidism (H).  She is once/day calcium and she is off calcitriol because she says it causes flushing (essentially self discontinued/ didn't fill rx when it ran out despite having active Rx) .  I have noted that if she remains off calcitriol she might need to take more calcium and more frequently, not just once/day.  Labs now on the current regimen.    Labs today and every 6 months.Treat to low normal or slightly below normal target calcium.      Postsurgical hypothyroidism  Treat to normal target TSH. - at target 6/22/201 on LT4 112 mcg/day  Check TSH reflex approx once/year or sooner prn    Throat symptoms  When mad or drinking cold - unclear what this is.   Options: see Dr Griffin; US.  We elected to return to Dr Griffin      Due to the COVID 19 pandemic this visit was a video visit. The patient gave verbal consent for the visit today.    I have independently reviewed and interpreted labs, imaging as indicated.     Chart review/prep time 1  [id number]   Visit Start time amwel 1523 invitation to appt ; doximity request 1530 ; connected but she couldn't hear me.  3rd attemp with [phone]  Visit Stop time  1548   _31_ minutes spent on the date of the encounter doing chart review, history and exam, documentation and further activities as noted above.      Birdie Proctor MD      Cc/ HISTORY OF PRESENT ILLNESS Delio returns  for followup of postsurgical hypothyroidism, postoperative hypocalcemia.      On 12/1/17 she was treated with total thyroidectomy removing 139 gram multinodular goiter.  There was a small fragment of parathyroid tissue on the left lobectomy specimen.  Postoperative PTH was < 3 on 12/2, 12/1, 1/5/18 , < 7 on 3/6/18, 7 on 10/15/18 with calcium 7.4. . She has continued to have post surgical hypoparathyroidism.      I harlan saw her 4/27/2020.    We have the following recent labs  5/3/19 24 hour urine calcium 140  mg/24hours.   6/1/2020 Ca 8.2, phos 4.2, TSH 2.58  3/18/2021 Ca 8.1, creatinine 0.68  6/22/2021 Ca 7.2, creatinin e0.77, phos 6.5, TSH 0.69, 25OHD 86     Today she reports she is taking:  LT4 112 mcg/day  Calcitriol 0.5 mcg/day-- causes flushing so bad which makes her stop the calcitriol.  She ran out /has been off it one month?  This causes facial redness and body sweating.    Calcium carbonate 500 2 tablets once/day-  Vitamin D 5000 international unit(s)/day    She has no symptoms as long as she takes her medication.  If she stops the calcium then her face is numb.     REVIEW OF SYSTEMS  She has been staying home during pandemic  Feeling sometimes good and sometimes not good  Foot numbness attributed to calcium-- worse if bending a long   When made feels something in throat  If drinks something Cold feels something in throat -- feels like something stuck in throat.    The sewing too tight if I swallow -   Voice is ok.    Menopause didn't go away  developed ulcer 2 months ago -- diagnosis H pylori -treated with antibiotic.        Past Medical History  Past Medical History:   Diagnosis Date     Goiter      Subclinical hyperthyroidism      Medications  Current Outpatient Medications   Medication Sig Dispense Refill     calcitRIOL (ROCALTROL) 0.5 MCG capsule Take 1 capsule (0.5 mcg) by mouth daily 90 capsule 4     calcium carbonate (TUMS) 500 MG chewable tablet Take 2 tablets (1,000 mg) by mouth daily 150 tablet 3     Cyanocobalamin (B-12) 1000 MCG TBCR Take 1,000 mcg by mouth daily 90 tablet 1     ibuprofen (ADVIL/MOTRIN) 800 MG tablet Take 1 tablet (800 mg) by mouth every 6 hours as needed for moderate pain 20 tablet 0     levothyroxine (SYNTHROID/LEVOTHROID) 112 MCG tablet Take 1 tablet (112 mcg) by mouth daily 90 tablet 4     oxyCODONE-acetaminophen (PERCOCET) 5-325 MG tablet Take 1-2 tablets by mouth every 4 hours as needed for pain (Patient not taking: Reported on 11/5/2021) 12 tablet 0     valACYclovir  "(VALTREX) 1000 mg tablet Take 1 tablet (1,000 mg) by mouth 3 times daily for 7 days 21 tablet 0     vitamin D3 (CHOLECALCIFEROL) 1000 units (25 mcg) tablet Take 5 tablets (5,000 Units) by mouth daily        Allergies  Allergies   Allergen Reactions     Flexeril [Cyclobenzaprine] Shortness Of Breath     Percocet [Oxycodone-Acetaminophen] Difficulty breathing       Family History  family history includes Thyroid Disease in her maternal aunt, sister, sister, and another family member.     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   She is mostly at home.      EXAM:  GENERAL middle aged woman inNAD  LMP 09/23/2019   BP Readings from Last 1 Encounters:   11/05/21 112/75      Pulse Readings from Last 1 Encounters:   11/05/21 67      Resp Readings from Last 1 Encounters:   09/18/21 16      Temp Readings from Last 1 Encounters:   11/05/21 98.9  F (37.2  C) (Tympanic)      SpO2 Readings from Last 1 Encounters:   11/05/21 100%      Wt Readings from Last 1 Encounters:   11/05/21 69.4 kg (153 lb)      Ht Readings from Last 1 Encounters:   03/18/21 1.549 m (5' 1\")   GENERAL: pleasant woman in no distress  SKIN: Visible skin clear. No significant rash, abnormal pigmentation or lesions.  EYES: Eyes grossly normal to inspection.  No discharge or erythema, or obvious scleral/conjunctival abnormalities.  NECK: no visible masses; no grossly visible goiter; I can see the surgical scar which is just left of center and perhaps it does pull a bit when she swallows   RESP: No audible wheeze, cough, or visible cyanosis.  No visible retractions or increased work of breathing.    NEURO: Awake, alert, responds appropriately to questions.  Mentation and speech fluent.  PSYCH:affect normal, judgement and insight intact, and appearance " well-groomed.      DATA REVIEW

## 2021-11-23 NOTE — LETTER
11/23/2021       RE: Delio Agrawal  [address]  [facility] [place] [address]     Dear Colleague,    Thank you for referring your patient, Delio Agrawal, to the [facility] [place] [facility]. Please see a copy of my visit note below.  Endocrinology  note    Attending Assessment/Plan :     Post-surgical hypoparathyroidism (H).  She is once/day calcium and she is off calcitriol because she says it causes flushing (essentially self discontinued/ didn't fill rx when it ran out despite having active Rx) .  I have noted that if she remains off calcitriol she might need to take more calcium and more frequently, not just once/day.  Labs now on the current regimen.    Labs today and every 6 months.Treat to low normal or slightly below normal target calcium.      Postsurgical hypothyroidism  Treat to normal target TSH. - at target 6/22/201 on LT4 112 mcg/day  Check TSH reflex approx once/year or sooner prn    Throat symptoms  When mad or drinking cold - unclear what this is.   Options: see Dr Griffin; US.  We elected to return to Dr Griffin      Due to the COVID 19 pandemic this visit was a video visit. The patient gave verbal consent for the visit today.    I have independently reviewed and interpreted labs, imaging as indicated.     Chart review/prep time 1  [id number]   Visit Start time amwel 1523 invitation to appt ; doximity request 1530 ; connected but she couldn't hear me.  3rd attemp with [phone]  Visit Stop time  1548   _31_ minutes spent on the date of the encounter doing chart review, history and exam, documentation and further activities as noted above.      Cc/ HISTORY OF PRESENT ILLNESS Delio returns  for followup of postsurgical hypothyroidism, postoperative hypocalcemia.      On 12/1/17 she was treated with total thyroidectomy removing 139 gram multinodular goiter.  There was a small fragment of parathyroid tissue on  the left lobectomy specimen.  Postoperative PTH was < 3 on 12/2, 12/1, 1/5/18 , < 7 on 3/6/18, 7 on 10/15/18 with calcium 7.4. . She has continued to have post surgical hypoparathyroidism.      I harlan saw her 4/27/2020.    We have the following recent labs  5/3/19 24 hour urine calcium 140 mg/24hours.   6/1/2020 Ca 8.2, phos 4.2, TSH 2.58  3/18/2021 Ca 8.1, creatinine 0.68  6/22/2021 Ca 7.2, creatinin e0.77, phos 6.5, TSH 0.69, 25OHD 86     Today she reports she is taking:  LT4 112 mcg/day  Calcitriol 0.5 mcg/day-- causes flushing so bad which makes her stop the calcitriol.  She ran out /has been off it one month?  This causes facial redness and body sweating.    Calcium carbonate 500 2 tablets once/day-  Vitamin D 5000 international unit(s)/day    She has no symptoms as long as she takes her medication.  If she stops the calcium then her face is numb.     REVIEW OF SYSTEMS  She has been staying home during pandemic  Feeling sometimes good and sometimes not good  Foot numbness attributed to calcium-- worse if bending a long   When made feels something in throat  If drinks something Cold feels something in throat -- feels like something stuck in throat.    The sewing too tight if I swallow -   Voice is ok.    Menopause didn't go away  developed ulcer 2 months ago -- diagnosis H pylori -treated with antibiotic.      Past Medical History  Past Medical History:   Diagnosis Date     Goiter      Subclinical hyperthyroidism      Medications  Current Outpatient Medications   Medication Sig Dispense Refill     calcitRIOL (ROCALTROL) 0.5 MCG capsule Take 1 capsule (0.5 mcg) by mouth daily 90 capsule 4     calcium carbonate (TUMS) 500 MG chewable tablet Take 2 tablets (1,000 mg) by mouth daily 150 tablet 3     Cyanocobalamin (B-12) 1000 MCG TBCR Take 1,000 mcg by mouth daily 90 tablet 1     ibuprofen (ADVIL/MOTRIN) 800 MG tablet Take 1 tablet (800 mg) by mouth every 6 hours as needed for moderate pain 20 tablet 0      "levothyroxine (SYNTHROID/LEVOTHROID) 112 MCG tablet Take 1 tablet (112 mcg) by mouth daily 90 tablet 4     oxyCODONE-acetaminophen (PERCOCET) 5-325 MG tablet Take 1-2 tablets by mouth every 4 hours as needed for pain (Patient not taking: Reported on 11/5/2021) 12 tablet 0     valACYclovir (VALTREX) 1000 mg tablet Take 1 tablet (1,000 mg) by mouth 3 times daily for 7 days 21 tablet 0     vitamin D3 (CHOLECALCIFEROL) 1000 units (25 mcg) tablet Take 5 tablets (5,000 Units) by mouth daily        Allergies  Allergies   Allergen Reactions     Flexeril [Cyclobenzaprine] Shortness Of Breath     Percocet [Oxycodone-Acetaminophen] Difficulty breathing     Family History  family history includes Thyroid Disease in her maternal aunt, sister, sister, and another family member.     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   She is mostly at home.      EXAM:  GENERAL middle aged woman inNAD  LMP 09/23/2019   BP Readings from Last 1 Encounters:   11/05/21 112/75      Pulse Readings from Last 1 Encounters:   11/05/21 67      Resp Readings from Last 1 Encounters:   09/18/21 16      Temp Readings from Last 1 Encounters:   11/05/21 98.9  F (37.2  C) (Tympanic)      SpO2 Readings from Last 1 Encounters:   11/05/21 100%      Wt Readings from Last 1 Encounters:   11/05/21 69.4 kg (153 lb)      Ht Readings from Last 1 Encounters:   03/18/21 1.549 m (5' 1\")   GENERAL: pleasant woman in no distress  SKIN: Visible skin clear. No significant rash, abnormal pigmentation or lesions.  EYES: Eyes grossly normal to inspection.  No discharge or erythema, or obvious scleral/conjunctival abnormalities.  NECK: no visible masses; no grossly visible goiter; I can see the surgical scar which is just left of center and perhaps it does pull a bit when she swallows "   RESP: No audible wheeze, cough, or visible cyanosis.  No visible retractions or increased work of breathing.    NEURO: Awake, alert, responds appropriately to questions.  Mentation and speech fluent.  PSYCH:affect normal, judgement and insight intact, and appearance well-groomed.      DATA REVIEW    Delio Agrawal  is being evaluated via a billable video visit.      How would you like to obtain your AVS? Zuu Onlninehart  For the video visit, send the invitation by: Text to cell phone: [phone]  Will anyone else be joining your video visit? No        Again, thank you for allowing me to participate in the care of your patient.      Sincerely,    Birdie Proctor MD

## 2021-11-23 NOTE — PROGRESS NOTES
Delio Agrawal  is being evaluated via a billable video visit.      How would you like to obtain your AVS? [employer]  For the video visit, send the invitation by: Text to cell phone: [phone]  Will anyone else be joining your video visit? No

## 2022-01-23 ENCOUNTER — HEALTH MAINTENANCE LETTER (OUTPATIENT)
Age: 52
End: 2022-01-23

## 2022-01-28 ENCOUNTER — LAB (OUTPATIENT)
Dept: LAB | Facility: CLINIC | Age: 52
End: 2022-01-28
Payer: COMMERCIAL

## 2022-01-28 DIAGNOSIS — E89.2 POST-SURGICAL HYPOPARATHYROIDISM (H): ICD-10-CM

## 2022-01-28 LAB
CALCIUM SERPL-MCNC: 7.4 MG/DL (ref 8.5–10.1)
CREAT SERPL-MCNC: 0.68 MG/DL (ref 0.52–1.04)
GFR SERPL CREATININE-BSD FRML MDRD: >90 ML/MIN/1.73M2
PHOSPHATE SERPL-MCNC: 5.2 MG/DL (ref 2.5–4.5)

## 2022-01-28 PROCEDURE — 84100 ASSAY OF PHOSPHORUS: CPT | Performed by: PATHOLOGY

## 2022-01-28 PROCEDURE — 82310 ASSAY OF CALCIUM: CPT | Performed by: PATHOLOGY

## 2022-01-28 PROCEDURE — 36415 COLL VENOUS BLD VENIPUNCTURE: CPT | Performed by: PATHOLOGY

## 2022-01-28 PROCEDURE — 82565 ASSAY OF CREATININE: CPT | Performed by: PATHOLOGY

## 2022-02-16 ENCOUNTER — HOSPITAL ENCOUNTER (EMERGENCY)
Facility: CLINIC | Age: 52
Discharge: HOME | End: 2022-02-17
Attending: EMERGENCY MEDICINE | Admitting: EMERGENCY MEDICINE
Payer: COMMERCIAL

## 2022-02-16 DIAGNOSIS — K29.00 ACUTE GASTRITIS WITHOUT HEMORRHAGE, UNSPECIFIED GASTRITIS TYPE: ICD-10-CM

## 2022-02-16 LAB
ALBUMIN SERPL-MCNC: 3.5 G/DL (ref 3.4–5)
ALBUMIN UR-MCNC: NEGATIVE MG/DL
ALP SERPL-CCNC: 57 U/L (ref 40–150)
ALT SERPL W P-5'-P-CCNC: 22 U/L (ref 0–50)
ANION GAP SERPL CALCULATED.3IONS-SCNC: 3 MMOL/L (ref 3–14)
APPEARANCE UR: CLEAR
AST SERPL W P-5'-P-CCNC: 21 U/L (ref 0–45)
BASOPHILS # BLD AUTO: 0 10E3/UL (ref 0–0.2)
BASOPHILS NFR BLD AUTO: 1 %
BILIRUB SERPL-MCNC: 0.2 MG/DL (ref 0.2–1.3)
BILIRUB UR QL STRIP: NEGATIVE
BUN SERPL-MCNC: 13 MG/DL (ref 7–30)
CALCIUM SERPL-MCNC: 7.5 MG/DL (ref 8.5–10.1)
CHLORIDE BLD-SCNC: 107 MMOL/L (ref 94–109)
CO2 SERPL-SCNC: 32 MMOL/L (ref 20–32)
COLOR UR AUTO: ABNORMAL
CREAT SERPL-MCNC: 0.67 MG/DL (ref 0.52–1.04)
EOSINOPHIL # BLD AUTO: 0.1 10E3/UL (ref 0–0.7)
EOSINOPHIL NFR BLD AUTO: 2 %
ERYTHROCYTE [DISTWIDTH] IN BLOOD BY AUTOMATED COUNT: 12.9 % (ref 10–15)
GFR SERPL CREATININE-BSD FRML MDRD: >90 ML/MIN/1.73M2
GLUCOSE BLD-MCNC: 102 MG/DL (ref 70–99)
GLUCOSE UR STRIP-MCNC: NEGATIVE MG/DL
HCG SERPL QL: NEGATIVE
HCT VFR BLD AUTO: 39.6 % (ref 35–47)
HGB BLD-MCNC: 13.1 G/DL (ref 11.7–15.7)
HGB UR QL STRIP: NEGATIVE
IMM GRANULOCYTES # BLD: 0 10E3/UL
IMM GRANULOCYTES NFR BLD: 0 %
KETONES UR STRIP-MCNC: NEGATIVE MG/DL
LEUKOCYTE ESTERASE UR QL STRIP: NEGATIVE
LIPASE SERPL-CCNC: 126 U/L (ref 73–393)
LYMPHOCYTES # BLD AUTO: 1.8 10E3/UL (ref 0.8–5.3)
LYMPHOCYTES NFR BLD AUTO: 35 %
MCH RBC QN AUTO: 30.1 PG (ref 26.5–33)
MCHC RBC AUTO-ENTMCNC: 33.1 G/DL (ref 31.5–36.5)
MCV RBC AUTO: 91 FL (ref 78–100)
MONOCYTES # BLD AUTO: 0.4 10E3/UL (ref 0–1.3)
MONOCYTES NFR BLD AUTO: 9 %
NEUTROPHILS # BLD AUTO: 2.8 10E3/UL (ref 1.6–8.3)
NEUTROPHILS NFR BLD AUTO: 53 %
NITRATE UR QL: NEGATIVE
NRBC # BLD AUTO: 0 10E3/UL
NRBC BLD AUTO-RTO: 0 /100
PH UR STRIP: 8.5 [PH] (ref 5–7)
PLATELET # BLD AUTO: 192 10E3/UL (ref 150–450)
POTASSIUM BLD-SCNC: 3.5 MMOL/L (ref 3.4–5.3)
PROT SERPL-MCNC: 7.7 G/DL (ref 6.8–8.8)
RBC # BLD AUTO: 4.35 10E6/UL (ref 3.8–5.2)
RBC URINE: 3 /HPF
SODIUM SERPL-SCNC: 142 MMOL/L (ref 133–144)
SP GR UR STRIP: 1.02 (ref 1–1.03)
SQUAMOUS EPITHELIAL: <1 /HPF
UROBILINOGEN UR STRIP-MCNC: NORMAL MG/DL
WBC # BLD AUTO: 5.1 10E3/UL (ref 4–11)
WBC URINE: 1 /HPF

## 2022-02-16 PROCEDURE — 85025 COMPLETE CBC W/AUTO DIFF WBC: CPT | Performed by: EMERGENCY MEDICINE

## 2022-02-16 PROCEDURE — 258N000003 HC RX IP 258 OP 636: Performed by: EMERGENCY MEDICINE

## 2022-02-16 PROCEDURE — 99285 EMERGENCY DEPT VISIT HI MDM: CPT | Mod: 25 | Performed by: EMERGENCY MEDICINE

## 2022-02-16 PROCEDURE — 81001 URINALYSIS AUTO W/SCOPE: CPT | Performed by: EMERGENCY MEDICINE

## 2022-02-16 PROCEDURE — 96360 HYDRATION IV INFUSION INIT: CPT | Mod: 59 | Performed by: EMERGENCY MEDICINE

## 2022-02-16 PROCEDURE — 36415 COLL VENOUS BLD VENIPUNCTURE: CPT | Performed by: EMERGENCY MEDICINE

## 2022-02-16 PROCEDURE — 84703 CHORIONIC GONADOTROPIN ASSAY: CPT | Performed by: EMERGENCY MEDICINE

## 2022-02-16 PROCEDURE — 99285 EMERGENCY DEPT VISIT HI MDM: CPT | Performed by: EMERGENCY MEDICINE

## 2022-02-16 PROCEDURE — 80053 COMPREHEN METABOLIC PANEL: CPT | Performed by: EMERGENCY MEDICINE

## 2022-02-16 PROCEDURE — 83690 ASSAY OF LIPASE: CPT | Performed by: EMERGENCY MEDICINE

## 2022-02-16 RX ORDER — SODIUM CHLORIDE 9 MG/ML
INJECTION, SOLUTION INTRAVENOUS CONTINUOUS
Status: DISCONTINUED | OUTPATIENT
Start: 2022-02-16 | End: 2022-02-17 | Stop reason: HOSPADM

## 2022-02-16 RX ADMIN — SODIUM CHLORIDE 1000 ML: 9 INJECTION, SOLUTION INTRAVENOUS at 23:42

## 2022-02-17 ENCOUNTER — APPOINTMENT (OUTPATIENT)
Dept: CT IMAGING | Facility: CLINIC | Age: 52
End: 2022-02-17
Attending: EMERGENCY MEDICINE
Payer: COMMERCIAL

## 2022-02-17 VITALS
OXYGEN SATURATION: 100 % | TEMPERATURE: 97.7 F | RESPIRATION RATE: 16 BRPM | BODY MASS INDEX: 27.06 KG/M2 | HEIGHT: 61 IN | HEART RATE: 61 BPM | WEIGHT: 143.3 LBS | DIASTOLIC BLOOD PRESSURE: 75 MMHG | SYSTOLIC BLOOD PRESSURE: 120 MMHG

## 2022-02-17 PROCEDURE — 250N000011 HC RX IP 250 OP 636: Performed by: EMERGENCY MEDICINE

## 2022-02-17 PROCEDURE — 250N000013 HC RX MED GY IP 250 OP 250 PS 637: Performed by: EMERGENCY MEDICINE

## 2022-02-17 PROCEDURE — 96361 HYDRATE IV INFUSION ADD-ON: CPT | Performed by: EMERGENCY MEDICINE

## 2022-02-17 PROCEDURE — 250N000009 HC RX 250: Performed by: EMERGENCY MEDICINE

## 2022-02-17 PROCEDURE — 74177 CT ABD & PELVIS W/CONTRAST: CPT

## 2022-02-17 RX ORDER — FAMOTIDINE 20 MG/1
20 TABLET, FILM COATED ORAL 2 TIMES DAILY PRN
Qty: 28 TABLET | Refills: 0 | Status: SHIPPED | OUTPATIENT
Start: 2022-02-17 | End: 2022-03-03

## 2022-02-17 RX ORDER — IOPAMIDOL 755 MG/ML
100 INJECTION, SOLUTION INTRAVASCULAR ONCE
Status: COMPLETED | OUTPATIENT
Start: 2022-02-17 | End: 2022-02-17

## 2022-02-17 RX ADMIN — SODIUM CHLORIDE 58 ML: 9 INJECTION, SOLUTION INTRAVENOUS at 00:23

## 2022-02-17 RX ADMIN — IOPAMIDOL 70 ML: 755 INJECTION, SOLUTION INTRAVENOUS at 00:24

## 2022-02-17 RX ADMIN — LIDOCAINE HYDROCHLORIDE 30 ML: 20 SOLUTION ORAL; TOPICAL at 00:24

## 2022-02-17 NOTE — ED TRIAGE NOTES
discomfort in the epigastric and LUQ under the ribs and watery stools since this AM.  Pt describes it feels it a hole or empty. Pt reports she pees all the time and even after voiding it isnt much and she has the urge to void again. No change in appetite, no fever, cough, dyspnea, nausea injury. Pt states the stomach issue got better after eating tums this afternoon.     Pt states she had shingles in October 2021 in the same area.

## 2022-02-17 NOTE — ED PROVIDER NOTES
[facility] EMERGENCY DEPARTMENT ([facility])  2/16/22    History     Chief Complaint   Patient presents with     Abdominal Pain     HPI  Delio Agrawal is a 51 year old female with PMH significant for history of H. pylori infection (7 months ago) and s/p thyroidectomy (2017) with postsurgical hypoparathyroidism (on calcium) who presents to the Emergency Department for evaluation of epigastric abdominal pain, urinary frequency, and urinary urgency. Patient reports that this morning she started to experience some epigastric abdominal pain. She states she also has urinary frequency and urgency. She states she did have 5 BMs today that were loose, but denies blood or black stool. She reports that the epigastric abdominal pain improved somewhat after taking Tums, as well as following passing gas or urinating. Patient indicates the pain does occasionally radiate to her back.      Patient reports she has had prior UTI and this does feel somewhat similar.  She denies prior kidney stone, hematuria, or dysuria.  She denies vaginal bleeding.  She denies nausea or vomiting. She denies chest pain or shortness of breath. She denies fever, cough, or known sick exposure. She denies lower extremity swelling, personal history of cardiac disease, or family history of early cardiac disease.  He denies taking estrogen or birth control.  She denies recent travel or surgery.  Patient reports she is not vaccinated for COVID-19.  Patient also states she also has some left upper quadrant pain that she has been experiencing for the past 2 weeks.  She reports this is under her ribs thinks it may be related to her previous shingles.    Past Medical History  Past Medical History:   Diagnosis Date     Goiter      H. pylori infection 06/23/2021     Postoperative hypothyroidism 12/01/2017     Postsurgical hypoparathyroidism (H) 12/01/2017     Subclinical hyperthyroidism      Past Surgical History:   Procedure Laterality Date      "THYROIDECTOMY N/A 2017    Procedure: THYROIDECTOMY;  Total Thyroidectomy of substernal goiter;  Surgeon: Keyla Griffin MD;  Location: [facility] OR     US BREAST BIOPSY RT  2017     calcium carbonate (TUMS) 500 MG chewable tablet  Cyanocobalamin (B-12) 1000 MCG TBCR  estradiol (ESTRACE) 0.1 MG/GM vaginal cream  ibuprofen (ADVIL/MOTRIN) 800 MG tablet  levothyroxine (SYNTHROID/LEVOTHROID) 112 MCG tablet  methylPREDNISolone (MEDROL DOSEPAK) 4 MG tablet therapy pack  oxyCODONE-acetaminophen (PERCOCET) 5-325 MG tablet  progesterone (CRINONE) 4 % GEL  valACYclovir (VALTREX) 1000 mg tablet  vitamin D3 (CHOLECALCIFEROL) 1000 units (25 mcg) tablet      Allergies   Allergen Reactions     Flexeril [Cyclobenzaprine] Shortness Of Breath     Percocet [Oxycodone-Acetaminophen] Difficulty breathing     Family History  Family History   Problem Relation Age of Onset     Thyroid Disease Sister         surgery x 2     Thyroid Disease Maternal Aunt         surgery complicated by loss of voice.   days later     Thyroid Disease Other      Thyroid Disease Sister      Social History   Social History     Tobacco Use     Smoking status: Never Smoker     Smokeless tobacco: Never Used   Substance Use Topics     Alcohol use: No     Drug use: No      Past medical history, past surgical history, medications, allergies, family history, and social history were reviewed with the patient. No additional pertinent items.       Review of Systems  A complete review of systems was performed with pertinent positives and negatives noted in the HPI, and all other systems negative.    Physical Exam   BP: 127/74  Pulse: 72  Temp: 98.2  F (36.8  C)  Resp: 16  Height: 155 cm (5' 1.02\")  Weight: 65 kg (143 lb 4.8 oz)  SpO2: 100 %  Physical Exam  Vitals reviewed.   Constitutional:       General: She is not in acute distress.     Appearance: She is well-developed.   HENT:      Head: Normocephalic and atraumatic.   Eyes:      Extraocular Movements: " Extraocular movements intact.      Conjunctiva/sclera: Conjunctivae normal.      Pupils: Pupils are equal, round, and reactive to light.   Cardiovascular:      Rate and Rhythm: Normal rate and regular rhythm.      Pulses: Normal pulses.      Heart sounds: Normal heart sounds. No murmur heard.  Pulmonary:      Effort: Pulmonary effort is normal. No respiratory distress.      Breath sounds: Normal breath sounds. No wheezing or rales.   Abdominal:      General: Bowel sounds are normal. There is no distension.      Palpations: Abdomen is soft. There is no mass.      Tenderness: There is abdominal tenderness (epigastric, LUQ). There is no right CVA tenderness, left CVA tenderness, guarding or rebound.      Comments: Also some mild right sided tenderness on exam, negative murphys sign   Musculoskeletal:         General: Normal range of motion.      Cervical back: Normal range of motion and neck supple. No rigidity.   Skin:     General: Skin is warm and dry.      Capillary Refill: Capillary refill takes less than 2 seconds.      Findings: No rash.   Neurological:      General: No focal deficit present.      Mental Status: She is alert and oriented to person, place, and time.      GCS: GCS eye subscore is 4. GCS verbal subscore is 5. GCS motor subscore is 6.      Cranial Nerves: No cranial nerve deficit.      Sensory: No sensory deficit.      Motor: No weakness.   Psychiatric:         Mood and Affect: Mood normal.         ED Course      Procedures   11:42 PM  The patient was seen and examined by Bárbara Lund MD in Room ED20.        The medical record was reviewed and interpreted.  Current labs reviewed and interpreted.  Current images reviewed and interpreted: as below.  Managed outpatient prescription medications.              Results for orders placed or performed during the hospital encounter of 02/16/22   CT Abdomen Pelvis w Contrast     Status: None    Narrative    EXAM: CT ABDOMEN PELVIS W CONTRAST  LOCATION: [facility]  [facility]  DATE/TIME: 2/17/2022 12:06 AM    INDICATION: LUQ flank pain, also some right sided pain and RLQ pain, urinary symptoms, loose stool, eval for kidney stone, appy, bowel obstruction, diverticulitis.  COMPARISON: None.  TECHNIQUE: CT scan of the abdomen and pelvis was performed following injection of IV contrast. Multiplanar reformats were obtained. Dose reduction techniques were used.  CONTRAST: 70mL isovue 370    FINDINGS:   LOWER CHEST: Normal.    HEPATOBILIARY: Normal.    PANCREAS: Normal.    SPLEEN: Normal.    ADRENAL GLANDS: Normal.    KIDNEYS/BLADDER: Normal.    BOWEL: Normal. No obstruction or inflammation. Normal appendix. No free fluid or gas.    LYMPH NODES: Normal.    VASCULATURE: Unremarkable.    PELVIC ORGANS: Normal.    MUSCULOSKELETAL: Normal.      Impression    IMPRESSION:   No acute process in the abdomen or pelvis.   Comprehensive metabolic panel     Status: Abnormal   Result Value Ref Range    Sodium 142 133 - 144 mmol/L    Potassium 3.5 3.4 - 5.3 mmol/L    Chloride 107 94 - 109 mmol/L    Carbon Dioxide (CO2) 32 20 - 32 mmol/L    Anion Gap 3 3 - 14 mmol/L    Urea Nitrogen 13 7 - 30 mg/dL    Creatinine 0.67 0.52 - 1.04 mg/dL    Calcium 7.5 (L) 8.5 - 10.1 mg/dL    Glucose 102 (H) 70 - 99 mg/dL    Alkaline Phosphatase 57 40 - 150 U/L    AST 21 0 - 45 U/L    ALT 22 0 - 50 U/L    Protein Total 7.7 6.8 - 8.8 g/dL    Albumin 3.5 3.4 - 5.0 g/dL    Bilirubin Total 0.2 0.2 - 1.3 mg/dL    GFR Estimate >90 >60 mL/min/1.73m2   Lipase     Status: Normal   Result Value Ref Range    Lipase 126 73 - 393 U/L   HCG qualitative Blood     Status: Normal   Result Value Ref Range    hCG Serum Qualitative Negative Negative   UA with Microscopic reflex to Culture     Status: Abnormal    Specimen: Urine, Midstream   Result Value Ref Range    Color Urine Light Yellow Colorless, Straw, Light Yellow, Yellow    Appearance Urine Clear Clear    Glucose Urine Negative Negative  mg/dL    Bilirubin Urine Negative Negative    Ketones Urine Negative Negative mg/dL    Specific Gravity Urine 1.016 1.003 - 1.035    Blood Urine Negative Negative    pH Urine 8.5 (H) 5.0 - 7.0    Protein Albumin Urine Negative Negative mg/dL    Urobilinogen Urine Normal Normal, 2.0 mg/dL    Nitrite Urine Negative Negative    Leukocyte Esterase Urine Negative Negative    RBC Urine 3 (H) <=2 /HPF    WBC Urine 1 <=5 /HPF    Squamous Epithelials Urine <1 <=1 /HPF    Narrative    Urine Culture not indicated   CBC with platelets and differential     Status: None   Result Value Ref Range    WBC Count 5.1 4.0 - 11.0 10e3/uL    RBC Count 4.35 3.80 - 5.20 10e6/uL    Hemoglobin 13.1 11.7 - 15.7 g/dL    Hematocrit 39.6 35.0 - 47.0 %    MCV 91 78 - 100 fL    MCH 30.1 26.5 - 33.0 pg    MCHC 33.1 31.5 - 36.5 g/dL    RDW 12.9 10.0 - 15.0 %    Platelet Count 192 150 - 450 10e3/uL    % Neutrophils 53 %    % Lymphocytes 35 %    % Monocytes 9 %    % Eosinophils 2 %    % Basophils 1 %    % Immature Granulocytes 0 %    NRBCs per 100 WBC 0 <1 /100    Absolute Neutrophils 2.8 1.6 - 8.3 10e3/uL    Absolute Lymphocytes 1.8 0.8 - 5.3 10e3/uL    Absolute Monocytes 0.4 0.0 - 1.3 10e3/uL    Absolute Eosinophils 0.1 0.0 - 0.7 10e3/uL    Absolute Basophils 0.0 0.0 - 0.2 10e3/uL    Absolute Immature Granulocytes 0.0 <=0.4 10e3/uL    Absolute NRBCs 0.0 10e3/uL   CBC with platelets differential     Status: None    Narrative    The following orders were created for panel order CBC with platelets differential.  Procedure                               Abnormality         Status                     ---------                               -----------         ------                     CBC with platelets and d...[[id number]]                      Final result                 Please view results for these tests on the individual orders.     Medications   0.9% sodium chloride BOLUS (0 mLs Intravenous Stopped 2/17/22 0119)   lidocaine (viscous) (XYLOCAINE) 2 %  15 mL, alum & mag hydroxide-simethicone (MAALOX) 15 mL GI Cocktail (30 mLs Oral Given 2/17/22 0024)   iopamidol (ISOVUE-370) solution 100 mL (70 mLs Intravenous Given 2/17/22 0024)   sodium chloride 100mL for CT scan flush use (58 mLs Intravenous Given 2/17/22 0023)        Assessments & Plan (with Medical Decision Making)   Patient presents with abdominal pain. She is overall well appearing on exam and in no acute distress here. Her vitals signs are within normal limits and she is afebrile. She has left sided, epigastric, as well as some right sided abdominal pain without signs of peritonitis. She also reports urinary frequency and urgency that has been ongoing. No dysuria, hematuria, vaginal discharge, or pelvic rashes. Differential diagnosis includes but is not limited to gastritis, ulcer, pancreatitis, gallbladder pathology, constipation, bowel obstruction, appendicitis, post herpetic neuralgia (however no rash from prior shingles currently present), appendicitis, kidney stone, pyelonephritis, urinary tract infection, etc. Patient does have prior history of h pylori treated 7 months ago and may still have gastritis issues as a result as she is no longer taking any GERD medication. Labs obtained and CT of the abdomen and pelvis with IV contrast for further evaluation with the right lower tenderness to rule out appendicitis and eval for kidney stone.      She was given GI cocktail as well as IV fluids. Labs were largely unremarkable. CMP with normal renal and liver function. CBC revealed WBC 5.1 and hemoglobin 13.1. Pregnancy test negative. Lipase within normal limits. UA reveals 3 RBC with 1 WBC and negative nitrite and leukocyte esterase without evidence of UTI. That makes pyelonephritis less likely. Did still consider kidney stone. CT reveals no acute pathology. No evidence of ureteral stones. Appendix is normal.     On re-evaluation, the patient is feeling improved. Do feel this is likely related to gastritis.  Will prescribe pepcid and omeprazole and recommended close follow up with her PCP. She was in agreement with this plan and discharge. She was given indications for return. She was discharged in improved condition.     I have reviewed the nursing notes. I have reviewed the findings, diagnosis, plan and need for follow up with the patient.    Discharge Medication List as of 2/17/2022  1:24 AM      START taking these medications    Details   famotidine (PEPCID) 20 MG tablet Take 1 tablet (20 mg) by mouth 2 times daily as needed (acid reflux), Disp-28 tablet, R-0, E-Prescribe      omeprazole (PRILOSEC) 20 MG DR capsule Take 1 capsule (20 mg) by mouth daily, Disp-30 capsule, R-0, E-Prescribe             Final diagnoses:   Acute gastritis without hemorrhage, unspecified gastritis type     I, Rodolfo Morris, am serving as a trained medical scribe to document services personally performed by Bárbara Lund MD, based on the provider's statements to me.     I, Bárbara Lund MD, was physically present and have reviewed and verified the accuracy of this note documented by Rodolfo Morris.    --  Bárbara Lund MD  [facility] EMERGENCY DEPARTMENT  2/16/2022     Bárbara Lund MD  05/26/22 9810

## 2022-02-17 NOTE — DISCHARGE INSTRUCTIONS
Please make an appointment to follow up with Your Primary Care Provider as soon as possible. You can discuss a GI referral with your primary doctor if not improving.     Take the omeprazole as prescribed every day. Can take pepcid as needed if you develop pain. Can also use  maalox or tums over the counter.     Return to the ED if you develop any new or worsening concerns.

## 2022-02-28 ENCOUNTER — OFFICE VISIT (OUTPATIENT)
Dept: FAMILY MEDICINE | Facility: CLINIC | Age: 52
End: 2022-02-28
Payer: COMMERCIAL

## 2022-02-28 VITALS
TEMPERATURE: 98 F | DIASTOLIC BLOOD PRESSURE: 66 MMHG | BODY MASS INDEX: 27.38 KG/M2 | SYSTOLIC BLOOD PRESSURE: 98 MMHG | HEART RATE: 67 BPM | WEIGHT: 145 LBS | OXYGEN SATURATION: 100 %

## 2022-02-28 DIAGNOSIS — E89.0 POSTSURGICAL HYPOTHYROIDISM: ICD-10-CM

## 2022-02-28 DIAGNOSIS — R79.89 LOW SERUM CALCIUM: ICD-10-CM

## 2022-02-28 DIAGNOSIS — E89.2 POST-SURGICAL HYPOPARATHYROIDISM (H): ICD-10-CM

## 2022-02-28 DIAGNOSIS — K29.00 ACUTE GASTRITIS WITHOUT HEMORRHAGE, UNSPECIFIED GASTRITIS TYPE: ICD-10-CM

## 2022-02-28 DIAGNOSIS — R07.89 ATYPICAL CHEST PAIN: ICD-10-CM

## 2022-02-28 DIAGNOSIS — Z12.11 COLON CANCER SCREENING: ICD-10-CM

## 2022-02-28 PROCEDURE — 99214 OFFICE O/P EST MOD 30 MIN: CPT | Performed by: FAMILY MEDICINE

## 2022-02-28 PROCEDURE — 93000 ELECTROCARDIOGRAM COMPLETE: CPT | Performed by: FAMILY MEDICINE

## 2022-02-28 NOTE — PROGRESS NOTES
Assessment & Plan       Acute gastritis without hemorrhage, unspecified gastritis type  Symptoms improved, we discussed diet modifications.  Not currently taking medication.   Continue to monitor.     Post-surgical hypoparathyroidism   Postsurgical hypothyroidism  Low serum calcium  Followed by endocrinology, calcium continues to be low.  Advised adding additional calcium 1, 000 mg daily and recheck labs in one month   - Basic metabolic panel  (Ca, Cl, CO2, Creat, Gluc, K, Na, BUN); Future    Colon cancer screening  - COLOGUARD([employer])    Atypical chest pain  EKG showed no changes.  Declined CXR and labs.  Discussed likely due to costochondritis. Advised to not take NSAIDs and only take Tylenol.   - EKG 12-lead complete w/read - Clinics  Follow if symptoms worsen or fail to improve.      Gilbetr Palacio MD  [facility]    Melissa Kebede is a 51 year old who presents for the following health issues     History of Present Illness     Reason for visit:  Blood test  Had these symptoms before:  No    She eats 4 or more servings of fruits and vegetables daily.She consumes 0 sweetened beverage(s) daily.She exercises with enough effort to increase her heart rate 9 or less minutes per day.  She exercises with enough effort to increase her heart rate 3 or less days per week.   She is taking medications regularly.       She was seen on 2/17/22 - She was started on omeprazole 20 mg daily 3-4 days. She loves oranges and that aggravated symptoms. Her  was changing her diet - broccoli sprouts. She doesn't have any more abdominal pain. No nausea, vomiting, gas or bloating.   She takes OTC medication for gas/bloating for a few days.   She doesn't have post-herpetic neuralgia.   She is taking calcium 950 mg daily.   Left popliteal pain two years ago after a lab draw. It got better.   Then with the recent ER lab draw, she felt arm heaviness which is now radiating to the chest. She  feels that something is poking her in the left chest. Pain is intermittent, mild, non radiating and improved with actitiy.   When she wakes up in the morning she feels the pain. After with movement she doesn't feel the pain.   No shortness of breath or coughing.    Review of Systems         Objective    LMP 09/23/2019   There is no height or weight on file to calculate BMI.  Physical Exam   BP 98/66 (BP Location: Right arm, Patient Position: Chair, Cuff Size: Adult Regular)   Pulse 67   Temp 98  F (36.7  C) (Temporal)   Wt 65.8 kg (145 lb)   LMP 09/23/2019   SpO2 100%   BMI 27.38 kg/m    GENERAL: healthy, alert and no distress  RESP: lungs clear to auscultation - no rales, rhonchi or wheezes  CV: regular rate and rhythm, normal S1 S2  MS: + left chest wall tenderness     EKG - Reviewed and interpreted by me appears normal, NSR, normal axis, normal intervals, no acute ST/T changes c/w ischemia, no LVH by voltage criteria, unchanged from previous tracings

## 2022-03-01 NOTE — MR AVS SNAPSHOT
After Visit Summary   11/28/2017    Delio Agrawal    MRN: [id number]           Patient Information     Date Of Birth          1970        Visit Information        Provider Department      11/28/2017 4:40 PM Cassandra Apodaca APRN [facility]        Today's Diagnoses     Preop general physical exam    -  1    Toxic multinodular goiter        Hyperthyroidism          Care Instructions      Before Your Surgery      Call your surgeon if there is any change in your health. This includes signs of a cold or flu (such as a sore throat, runny nose, cough, rash or fever).    Do not smoke, drink alcohol or take over the counter medicine (unless your surgeon or primary care doctor tells you to) for the 24 hours before and after surgery.    If you take prescribed drugs: Follow your doctor s orders about which medicines to take and which to stop until after surgery.    Eating and drinking prior to surgery: follow the instructions from your surgeon    Take a shower or bath the night before surgery. Use the soap your surgeon gave you to gently clean your skin. If you do not have soap from your surgeon, use your regular soap. Do not shave or scrub the surgery site.  Wear clean pajamas and have clean sheets on your bed.           Follow-ups after your visit        Your next 10 appointments already scheduled     Nov 29, 2017  9:30 AM CST   (Arrive by 9:15 AM)   PAC PHONE RN ASSESSMENT with  Pac Rn   [facility] ([facility])    [address] [address] [phone]           Note: this is not an onsite visit; there is no need to come to the facility.            Dec 01, 2017   Procedure with Keyla Griffin MD   [facility], Same Day Surgery (--)    [address] [facility] [address]   [phone]            Dec 18, 2017  3:30 PM CST   (Arrive by 3:15 PM)   Return Visit with Keyla Weston  "MD Elias   [facility] ([facility] [place])    [address] [facility]  [address] [facility] [address] [phone]              Who to contact     If you have questions or need follow up information about today's clinic visit or your schedule please contact [facility] directly at [phone].  Normal or non-critical lab and imaging results will be communicated to you by MyChart, letter or phone within 4 business days after the clinic has received the results. If you do not hear from us within 7 days, please contact the clinic through [employer] or phone. If you have a critical or abnormal lab result, we will notify you by phone as soon as possible.  Submit refill requests through [employer] or call your pharmacy and they will forward the refill request to us. Please allow 3 business days for your refill to be completed.          Additional Information About Your Visit        [employer] Information     F[employer]ball lets you send messages to your doctor, view your test results, renew your prescriptions, schedule appointments and more. To sign up, go to [email or web address] . Click on \"Log in\" on the left side of the screen, which will take you to the Welcome page. Then click on \"Sign up Now\" on the right side of the page.     You will be asked to enter the access code listed below, as well as some personal information. Please follow the directions to create your username and password.     Your access code is: [email or web address]  Expires: 2017  5:30 AM     Your access code will  in 90 days. If you need help or a new code, please call your [facility] clinic or [phone].        Care EveryWhere ID     This is your Care EveryWhere ID. This could be used by other organizations to access your [facility] medical records  [id number]        Your Vitals Were     Pulse Temperature Respirations Height Pulse Oximetry BMI (Body Mass Index)    72 97.9  F (36.6  C) " "(Tympanic) 20 5' 0.5\" (1.537 m) 98% 31.12 kg/m2       Blood Pressure from Last 3 Encounters:   11/28/17 113/70   10/09/17 (P) 114/75   05/20/17 96/70    Weight from Last 3 Encounters:   11/28/17 162 lb (73.5 kg)   10/23/17 160 lb (72.6 kg)   10/09/17 162 lb (73.5 kg)              Today, you had the following     No orders found for display       Primary Care Provider Fax #    Physician No Ref-Primary [phone]       No address on file        Equal Access to Services     [facility]: Hadii norma silva Soadelina, wacourtneyda kathi, kumar kaalmada sourav, asiya de leon . So [facility] [phone].    ATENCIÓN: Si habla español, tiene a clemente disposición servicios gratuitos de asistencia lingüística. Llame al [phone].    We comply with applicable federal civil rights laws and [place] laws. We do not discriminate on the basis of race, color, national origin, age, disability, sex, sexual orientation, or gender identity.            Thank you!     Thank you for choosing [facility]  for your care. Our goal is always to provide you with excellent care. Hearing back from our patients is one way we can continue to improve our services. Please take a few minutes to complete the written survey that you may receive in the mail after your visit with us. Thank you!             Your Updated Medication List - Protect others around you: Learn how to safely use, store and throw away your medicines at www.disposemymeds.org.          This list is accurate as of: 11/28/17  5:32 PM.  Always use your most recent med list.                   Brand Name Dispense Instructions for use Diagnosis    methimazole 5 MG tablet    TAPAZOLE    30 tablet    Take 1 tablet (5 mg) by mouth daily    Toxic multinodular goiter         " Detail Level: Zone Render In Strict Bullet Format?: No Continue Regimen: Triamcinolone

## 2022-03-16 ENCOUNTER — OFFICE VISIT (OUTPATIENT)
Dept: OBGYN | Facility: CLINIC | Age: 52
End: 2022-03-16
Attending: ADVANCED PRACTICE MIDWIFE
Payer: COMMERCIAL

## 2022-03-16 VITALS
SYSTOLIC BLOOD PRESSURE: 106 MMHG | HEIGHT: 61 IN | WEIGHT: 145.7 LBS | DIASTOLIC BLOOD PRESSURE: 70 MMHG | BODY MASS INDEX: 27.51 KG/M2 | HEART RATE: 74 BPM

## 2022-03-16 DIAGNOSIS — Z01.419 ENCOUNTER FOR GYNECOLOGICAL EXAMINATION WITHOUT ABNORMAL FINDING: Primary | ICD-10-CM

## 2022-03-16 DIAGNOSIS — Z12.4 SCREENING FOR CERVICAL CANCER: ICD-10-CM

## 2022-03-16 DIAGNOSIS — Z12.31 ENCOUNTER FOR SCREENING MAMMOGRAM FOR BREAST CANCER: ICD-10-CM

## 2022-03-16 DIAGNOSIS — N95.1 MENOPAUSAL SYNDROME (HOT FLASHES): ICD-10-CM

## 2022-03-16 PROCEDURE — G0145 SCR C/V CYTO,THINLAYER,RESCR: HCPCS | Performed by: ADVANCED PRACTICE MIDWIFE

## 2022-03-16 PROCEDURE — G0463 HOSPITAL OUTPT CLINIC VISIT: HCPCS

## 2022-03-16 PROCEDURE — 99396 PREV VISIT EST AGE 40-64: CPT | Performed by: ADVANCED PRACTICE MIDWIFE

## 2022-03-16 PROCEDURE — 87624 HPV HI-RISK TYP POOLED RSLT: CPT | Performed by: ADVANCED PRACTICE MIDWIFE

## 2022-03-16 RX ORDER — PROGESTERONE 45 MG/1.125G
45 GEL VAGINAL DAILY
Qty: 1.125 G | Refills: 0 | Status: SHIPPED | OUTPATIENT
Start: 2022-03-16 | End: 2022-06-06

## 2022-03-16 RX ORDER — ESTRADIOL 0.1 MG/G
2 CREAM VAGINAL
Qty: 42.5 G | Refills: 1 | Status: SHIPPED | OUTPATIENT
Start: 2022-03-17 | End: 2022-06-06

## 2022-03-16 NOTE — PROGRESS NOTES
Subjective:  Delio Agrawal is a 51 year old female who presents today for her annual exam.  Accompanied to visit by partner.  HPI:   - Pt reports last period 2 years ago, has had no bleeding or spotting since.  Reports family hx of early menopause (many family members going through menopause 40s).   - Has noticed increase in hot flashes at night, waking up sweaty, interrupting.  Also endorses vaginal dryness   - Reports breast biopsy in 2017, benign   - Pt has history of thyroidectomy (both lobes removed), hypothyroidism on synthroid   - Hypocalcemia, being managed by PCP.  Reports normal EKG last week. Has follow-up appt scheduled next week.    - Reports history of SIBO d/t previous medication exposure.    - Prefers nutritional or alternative therapies.  Very hesitant to do any type of oral medications d/t previous history with medications affecting gut.    Menstrual History:  OB History    Para Term  AB Living   0 0 0 0 0 0   SAB IAB Ectopic Multiple Live Births   0 0 0 0 0      Patient's last menstrual period was 2019.   Periods are absent,  Dysmenorrhea none. Cyclic symptoms include  NONE. Additional symptoms include NONE  Social History:  Current contraception: none  Unable to assess safety d/t partner in room.    Social History     Socioeconomic History     Marital status:      Spouse name: Not on file     Number of children: Not on file     Years of education: Not on file     Highest education level: Not on file   Occupational History     Not on file   Tobacco Use     Smoking status: Never Smoker     Smokeless tobacco: Never Used   Substance and Sexual Activity     Alcohol use: No     Drug use: No     Sexual activity: Yes     Partners: Male     Birth control/protection: None   Other Topics Concern     Parent/sibling w/ CABG, MI or angioplasty before 65F 55M? No   Social History Narrative    How much exercise per week? Walking a lot during the week.    How much calcium per day?  Organic foods only tums       How much caffeine per day? 0    How much vitamin D per day? Organic foods only     Do you/your family wear seatbelts?  Yes    Do you/your family use safety helmets? No    Do you/your family use sunscreen? No    Do you/your family keep firearms in the home? No    Do you/your family have a smoke detector(s)? Yes        Do you feel safe in your home? Yes    Has anyone ever touched you in an unwanted manner? No        [facility]163 mg (actual weight)-2019              Social Determinants of Health     Financial Resource Strain: Not on file   Food Insecurity: Not on file   Transportation Needs: Not on file   Physical Activity: Not on file   Stress: Not on file   Social Connections: Not on file   Intimate Partner Violence: Not on file   Housing Stability: Not on file     Past Screenings:  Health Maintenance   Topic Date Due     ADVANCE CARE PLANNING  Never done     COVID-19 Vaccine (1) Never done     COLORECTAL CANCER SCREENING  Never done     HIV SCREENING  Never done     HEPATITIS C SCREENING  Never done     DTAP/TDAP/TD IMMUNIZATION (1 - Tdap) Never done     LIPID  Never done     MAMMO SCREENING  01/12/2018     ZOSTER IMMUNIZATION (1 of 2) Never done     PREVENTIVE CARE VISIT  12/30/2020     INFLUENZA VACCINE (1) Never done     HPV TEST  03/16/2027     PAP  03/16/2027     PHQ-2 (once per calendar year)  Completed     Pneumococcal Vaccine: Pediatrics (0 to 5 Years) and At-Risk Patients (6 to 64 Years)  Aged Out     IPV IMMUNIZATION  Aged Out     MENINGITIS IMMUNIZATION  Aged Out     HEPATITIS B IMMUNIZATION  Aged Out     Lab Results   Component Value Date    PAP OTHER-NIL, See Result 12/07/2016      History of abnormal Pap smear: No  Lab Results   Component Value Date    TSH 0.69 06/22/2021     Immunizations:  Immunization History   Administered Date(s) Administered     MMR 03/17/2014     History:  Allergies   Allergen Reactions     Flexeril [Cyclobenzaprine] Shortness Of  "Breath     Percocet [Oxycodone-Acetaminophen] Difficulty breathing     Family History   Problem Relation Age of Onset     Thyroid Disease Sister         surgery x 2     Thyroid Disease Maternal Aunt         surgery complicated by loss of voice.   days later     Thyroid Disease Other      Thyroid Disease Sister      Past Medical History:   Diagnosis Date     Goiter      H. pylori infection 2021     Postoperative hypothyroidism 2017     Postsurgical hypoparathyroidism (H) 2017     Subclinical hyperthyroidism      Past Surgical History:   Procedure Laterality Date     THYROIDECTOMY N/A 2017    Procedure: THYROIDECTOMY;  Total Thyroidectomy of substernal goiter;  Surgeon: Keyla Griffin MD;  Location: [facility] OR     ROS:  CV: NEGATIVE for chest pain, palpitations   GI: NEGATIVE for nausea, abdominal pain, heartburn, or change in bowel habits  : NEGATIVE for frequency, dysuria, or hematuria  C: NEGATIVE for fever, chills  E: NEGATIVE for vision changes   R: NEGATIVE for significant cough or SOB  M: NEGATIVE for significant arthralgias or myalgia  N: NEGATIVE for weakness, dizziness or paresthesias or headache    Physical Exam:  /70 (BP Location: Left arm, Patient Position: Chair)   Pulse 74   Ht 1.549 m (5' 1\")   Wt 66.1 kg (145 lb 11.2 oz)   LMP 2019   BMI 27.53 kg/m    BMI: Body mass index is 27.53 kg/m .  GENERAL APPEARANCE: healthy, alert and no distress  NECK: no adenopathy, no asymmetry, masses  RESP: lungs clear to auscultation - no rales, rhonchi or wheezes  BREAST: normal without masses, tenderness or nipple discharge and no palpable axillary masses or adenopathy  CV: regular rates and rhythm, normal S1 S2, no S3 or S4 and no murmur, click or rub  ABDOMEN: soft, nontender, without hepatosplenomegaly or masses    PELVIC EXAM:  External Genitalia: WNL  Bartholin's/Urethral Meatus/Tolley's (BUS):  WNL/Negative  Bladder:  WNL  Vagina:  Normal mucosa, mild " atrophy  Cervix:  healthy, friable, stenotic  Uterus: bimanual deferred     WET PREP:Not done  Gonorrhea and chlamydia screen obtained: NO      Assessment:  Annual Gyn exam within normal limits  Cervical cancer screening  Hot flashes, post-menopausal  Breast cancer screening    Plan:  Orders Placed This Encounter   Procedures     Obtaining, preparing and conveyance of cervical or vaginal smear to laboratory.     MA Screening Digital Bilateral     - Additional teaching done at this visit included: calcium, menopause and mammography  - Reviewed physiologic changes post-menopausal and effects of decreased estrogen.  Reviewed options for treatment with hormone replacement therapy. Reviewed importance of combined therapy with estrogen and progesterone to prevent endometrial hyperplasia. Due to patient's hesitancy to take oral medications, reviewed vaginal options which patient was receptive to.  May also be a candidate for Femring or transdermal method. Consulted with Dr. Townsend re: options for vaginal progesterone.   - Pap with HPV cotesting collected.   - Discussed breast cancer screening guidelines and encouraged to schedule mammogram. Order placed.  - RTC in 3 months for follow-up    CHANDRAKANT Morris CNM

## 2022-03-16 NOTE — LETTER
3/16/2022       RE: Delio Agrawal  [address]  [place] [address]     Dear Colleague,    Thank you for referring your patient, Delio Agrawal, to the [facility] [place] at [facility]. Please see a copy of my visit note below.    Subjective:  Delio Agrawal is a 51 year old female who presents today for her annual exam.  Accompanied to visit by partner.  HPI:   - Pt reports last period 2 years ago, has had no bleeding or spotting since.  Reports family hx of early menopause (many family members going through menopause 40s).   - Has noticed increase in hot flashes at night, waking up sweaty, interrupting.  Also endorses vaginal dryness   - Reports breast biopsy in 2017, benign   - Pt has history of thyroidectomy (both lobes removed), hypothyroidism on synthroid   - Hypocalcemia, being managed by PCP.  Reports normal EKG last week. Has follow-up appt scheduled next week.    - Reports history of SIBO d/t previous medication exposure.    - Prefers nutritional or alternative therapies.  Very hesitant to do any type of oral medications d/t previous history with medications affecting gut.    Menstrual History:  OB History    Para Term  AB Living   0 0 0 0 0 0   SAB IAB Ectopic Multiple Live Births   0 0 0 0 0      Patient's last menstrual period was 2019.   Periods are absent,  Dysmenorrhea none. Cyclic symptoms include  NONE. Additional symptoms include NONE  Social History:  Current contraception: none  Unable to assess safety d/t partner in room.    Social History     Socioeconomic History     Marital status:      Spouse name: Not on file     Number of children: Not on file     Years of education: Not on file     Highest education level: Not on file   Occupational History     Not on file   Tobacco Use     Smoking status: Never Smoker     Smokeless tobacco: Never Used   Substance and Sexual Activity      Alcohol use: No     Drug use: No     Sexual activity: Yes     Partners: Male     Birth control/protection: None   Other Topics Concern     Parent/sibling w/ CABG, MI or angioplasty before 65F 55M? No   Social History Narrative    How much exercise per week? Walking a lot during the week.    How much calcium per day? Organic foods only tums       How much caffeine per day? 0    How much vitamin D per day? Organic foods only     Do you/your family wear seatbelts?  Yes    Do you/your family use safety helmets? No    Do you/your family use sunscreen? No    Do you/your family keep firearms in the home? No    Do you/your family have a smoke detector(s)? Yes        Do you feel safe in your home? Yes    Has anyone ever touched you in an unwanted manner? No        Reviewed cmckim lpn 12-2,163 mg (actual weight)-2019              Social Determinants of Health     Financial Resource Strain: Not on file   Food Insecurity: Not on file   Transportation Needs: Not on file   Physical Activity: Not on file   Stress: Not on file   Social Connections: Not on file   Intimate Partner Violence: Not on file   Housing Stability: Not on file     Past Screenings:  Health Maintenance   Topic Date Due     ADVANCE CARE PLANNING  Never done     COVID-19 Vaccine (1) Never done     COLORECTAL CANCER SCREENING  Never done     HIV SCREENING  Never done     HEPATITIS C SCREENING  Never done     DTAP/TDAP/TD IMMUNIZATION (1 - Tdap) Never done     LIPID  Never done     MAMMO SCREENING  01/12/2018     ZOSTER IMMUNIZATION (1 of 2) Never done     PREVENTIVE CARE VISIT  12/30/2020     INFLUENZA VACCINE (1) Never done     HPV TEST  03/16/2027     PAP  03/16/2027     PHQ-2 (once per calendar year)  Completed     Pneumococcal Vaccine: Pediatrics (0 to 5 Years) and At-Risk Patients (6 to 64 Years)  Aged Out     IPV IMMUNIZATION  Aged Out     MENINGITIS IMMUNIZATION  Aged Out     HEPATITIS B IMMUNIZATION  Aged Out     Lab Results   Component Value Date    PAP  "OTHER-NIL, See Result 2016      History of abnormal Pap smear: No  Lab Results   Component Value Date    TSH 0.69 2021     Immunizations:  Immunization History   Administered Date(s) Administered     MMR 2014     History:  Allergies   Allergen Reactions     Flexeril [Cyclobenzaprine] Shortness Of Breath     Percocet [Oxycodone-Acetaminophen] Difficulty breathing     Family History   Problem Relation Age of Onset     Thyroid Disease Sister         surgery x 2     Thyroid Disease Maternal Aunt         surgery complicated by loss of voice.   days later     Thyroid Disease Other      Thyroid Disease Sister      Past Medical History:   Diagnosis Date     Goiter      H. pylori infection 2021     Postoperative hypothyroidism 2017     Postsurgical hypoparathyroidism (H) 2017     Subclinical hyperthyroidism      Past Surgical History:   Procedure Laterality Date     THYROIDECTOMY N/A 2017    Procedure: THYROIDECTOMY;  Total Thyroidectomy of substernal goiter;  Surgeon: Keyla Griffin MD;  Location: [facility] OR     ROS:  CV: NEGATIVE for chest pain, palpitations   GI: NEGATIVE for nausea, abdominal pain, heartburn, or change in bowel habits  : NEGATIVE for frequency, dysuria, or hematuria  C: NEGATIVE for fever, chills  E: NEGATIVE for vision changes   R: NEGATIVE for significant cough or SOB  M: NEGATIVE for significant arthralgias or myalgia  N: NEGATIVE for weakness, dizziness or paresthesias or headache    Physical Exam:  /70 (BP Location: Left arm, Patient Position: Chair)   Pulse 74   Ht 1.549 m (5' 1\")   Wt 66.1 kg (145 lb 11.2 oz)   LMP 2019   BMI 27.53 kg/m    BMI: Body mass index is 27.53 kg/m .  GENERAL APPEARANCE: healthy, alert and no distress  NECK: no adenopathy, no asymmetry, masses  RESP: lungs clear to auscultation - no rales, rhonchi or wheezes  BREAST: normal without masses, tenderness or nipple discharge and no palpable axillary masses or " adenopathy  CV: regular rates and rhythm, normal S1 S2, no S3 or S4 and no murmur, click or rub  ABDOMEN: soft, nontender, without hepatosplenomegaly or masses    PELVIC EXAM:  External Genitalia: WNL  Bartholin's/Urethral Meatus/Piggott's (BUS):  WNL/Negative  Bladder:  WNL  Vagina:  Normal mucosa, mild atrophy  Cervix:  healthy, friable, stenotic  Uterus: bimanual deferred     WET PREP:Not done  Gonorrhea and chlamydia screen obtained: NO      Assessment:  Annual Gyn exam within normal limits  Cervical cancer screening  Hot flashes, post-menopausal  Breast cancer screening    Plan:  Orders Placed This Encounter   Procedures     Obtaining, preparing and conveyance of cervical or vaginal smear to laboratory.     MA Screening Digital Bilateral     - Additional teaching done at this visit included: calcium, menopause and mammography  - Reviewed physiologic changes post-menopausal and effects of decreased estrogen.  Reviewed options for treatment with hormone replacement therapy. Reviewed importance of combined therapy with estrogen and progesterone to prevent endometrial hyperplasia. Due to patient's hesitancy to take oral medications, reviewed vaginal options which patient was receptive to.  May also be a candidate for Femring or transdermal method. Consulted with Dr. Townsend re: options for vaginal progesterone.   - Pap with HPV cotesting collected.   - Discussed breast cancer screening guidelines and encouraged to schedule mammogram. Order placed.  - RTC in 3 months for follow-up    CHANDRAKANT Morris CNM

## 2022-03-18 ENCOUNTER — TELEPHONE (OUTPATIENT)
Dept: URGENT CARE | Facility: URGENT CARE | Age: 52
End: 2022-03-18
Payer: COMMERCIAL

## 2022-03-18 NOTE — TELEPHONE ENCOUNTER
Prior Authorization Retail Medication Request    Medication/Dose: crinone 4% getl  ICD code (if different than what is on RX):    Previously Tried and Failed:    Rationale:      Insurance Name:    Insurance ID:      Pharmacy Information (if different than what is on RX)  Name:    Phone:

## 2022-03-19 NOTE — TELEPHONE ENCOUNTER
PA Initiation    Medication: progesterone (CRINONE) 4 % GEL  Insurance Company: [email or web address] - Phone [phone] Fax [phone]  Pharmacy Filling the Rx: [place], [place] - [address]  Filling Pharmacy Phone: [phone]  Filling Pharmacy Fax: [phone]  Start Date: 3/18/2022

## 2022-03-21 LAB — COLOGUARD-ABSTRACT: NEGATIVE

## 2022-03-22 LAB
BKR LAB AP GYN ADEQUACY: NORMAL
BKR LAB AP GYN INTERPRETATION: NORMAL
BKR LAB AP HPV REFLEX: NORMAL
BKR LAB AP PREVIOUS ABNORMAL: NORMAL
PATH REPORT.COMMENTS IMP SPEC: NORMAL
PATH REPORT.COMMENTS IMP SPEC: NORMAL
PATH REPORT.RELEVANT HX SPEC: NORMAL

## 2022-03-24 LAB
HUMAN PAPILLOMA VIRUS 16 DNA: NEGATIVE
HUMAN PAPILLOMA VIRUS 18 DNA: NEGATIVE
HUMAN PAPILLOMA VIRUS FINAL DIAGNOSIS: NORMAL
HUMAN PAPILLOMA VIRUS OTHER HR: NEGATIVE

## 2022-03-24 NOTE — PATIENT INSTRUCTIONS
Before Your Surgery      Call your surgeon if there is any change in your health. This includes signs of a cold or flu (such as a sore throat, runny nose, cough, rash or fever).    Do not smoke, drink alcohol or take over the counter medicine (unless your surgeon or primary care doctor tells you to) for the 24 hours before and after surgery.    If you take prescribed drugs: Follow your doctor s orders about which medicines to take and which to stop until after surgery.    Eating and drinking prior to surgery: follow the instructions from your surgeon    Take a shower or bath the night before surgery. Use the soap your surgeon gave you to gently clean your skin. If you do not have soap from your surgeon, use your regular soap. Do not shave or scrub the surgery site.  Wear clean pajamas and have clean sheets on your bed.    Stable

## 2022-03-25 NOTE — TELEPHONE ENCOUNTER
PRIOR AUTHORIZATION DENIED    Medication: progesterone (CRINONE) 4 % GEL--DENIED    Denial Date: 3/24/2022    Denial Rational: Excluded    Appeal Information:          no

## 2022-03-28 ENCOUNTER — LAB (OUTPATIENT)
Dept: LAB | Facility: CLINIC | Age: 52
End: 2022-03-28
Payer: COMMERCIAL

## 2022-03-28 DIAGNOSIS — R79.89 LOW SERUM CALCIUM: ICD-10-CM

## 2022-03-28 DIAGNOSIS — E89.2 POST-SURGICAL HYPOPARATHYROIDISM (H): ICD-10-CM

## 2022-03-28 PROCEDURE — 36415 COLL VENOUS BLD VENIPUNCTURE: CPT

## 2022-03-28 PROCEDURE — 80048 BASIC METABOLIC PNL TOTAL CA: CPT

## 2022-03-28 PROCEDURE — 84100 ASSAY OF PHOSPHORUS: CPT

## 2022-03-29 ENCOUNTER — TELEPHONE (OUTPATIENT)
Dept: OBGYN | Facility: CLINIC | Age: 52
End: 2022-03-29
Payer: COMMERCIAL

## 2022-03-29 LAB
ANION GAP SERPL CALCULATED.3IONS-SCNC: 5 MMOL/L (ref 3–14)
BUN SERPL-MCNC: 9 MG/DL (ref 7–30)
CALCIUM SERPL-MCNC: 7.6 MG/DL (ref 8.5–10.1)
CALCIUM SERPL-MCNC: 7.6 MG/DL (ref 8.5–10.1)
CHLORIDE BLD-SCNC: 103 MMOL/L (ref 94–109)
CO2 SERPL-SCNC: 29 MMOL/L (ref 20–32)
CREAT SERPL-MCNC: 0.73 MG/DL (ref 0.52–1.04)
GFR SERPL CREATININE-BSD FRML MDRD: >90 ML/MIN/1.73M2
GLUCOSE BLD-MCNC: 78 MG/DL (ref 70–99)
PHOSPHATE SERPL-MCNC: 5.3 MG/DL (ref 2.5–4.5)
POTASSIUM BLD-SCNC: 3.5 MMOL/L (ref 3.4–5.3)
SODIUM SERPL-SCNC: 137 MMOL/L (ref 133–144)

## 2022-03-29 NOTE — TELEPHONE ENCOUNTER
Call attempt made to discuss prior authorization denial for Crinone (progesterone gel).     Discussed alternative options with Dr. Calvin, not enough data to support progesterone capsules or gel for endometrial protection. Recommended Patch with estrogen/progesterone combo or PO progesterone with continued estrace cream use.     No answer and no option to leave voicemail, as voicemail is not yet set up.     Message sent to RN team to call patient to discuss options.     CHANDRAKANT Lomas CNM

## 2022-03-30 ENCOUNTER — TELEPHONE (OUTPATIENT)
Dept: OBGYN | Facility: CLINIC | Age: 52
End: 2022-03-30
Payer: COMMERCIAL

## 2022-03-30 NOTE — TELEPHONE ENCOUNTER
Attempt #1 Tried to call pt - no VM set up.  Will try     Pt PA denied for Yuridia.  CNM requested: Please see my note and then try to reach her again to discuss.        Discussed alternative options with Dr. Calvin, not enough data to support progesterone capsules or gel for endometrial protection. Recommended Patch with estrogen/progesterone combo or PO progesterone with continued estrace cream use.      No answer and no option to leave voicemail, as voicemail is not yet set up.

## 2022-04-04 ENCOUNTER — TELEPHONE (OUTPATIENT)
Dept: OBGYN | Facility: CLINIC | Age: 52
End: 2022-04-04
Payer: COMMERCIAL

## 2022-04-04 NOTE — TELEPHONE ENCOUNTER
This writer was able to reach pt to discuss options of a patch instead of the prescribed cream.  Pt stated that she had tried something else and would not like to try anything else.  Pt declined to have CNM reach out to her for further suggestions.

## 2022-04-05 NOTE — TELEPHONE ENCOUNTER
Placed follow up call to patient to discuss denial of progesterone gel and alternative options per provider notes. No answer and patient did not have VM set up.

## 2022-04-08 ENCOUNTER — OFFICE VISIT (OUTPATIENT)
Dept: URGENT CARE | Facility: URGENT CARE | Age: 52
End: 2022-04-08
Payer: COMMERCIAL

## 2022-04-08 VITALS
TEMPERATURE: 97.8 F | SYSTOLIC BLOOD PRESSURE: 107 MMHG | HEART RATE: 69 BPM | WEIGHT: 145 LBS | BODY MASS INDEX: 27.4 KG/M2 | DIASTOLIC BLOOD PRESSURE: 70 MMHG | OXYGEN SATURATION: 100 %

## 2022-04-08 DIAGNOSIS — M25.512 ACUTE PAIN OF LEFT SHOULDER: Primary | ICD-10-CM

## 2022-04-08 PROCEDURE — 99213 OFFICE O/P EST LOW 20 MIN: CPT | Performed by: FAMILY MEDICINE

## 2022-04-08 NOTE — PROGRESS NOTES
Subjective: Complex history of hypoparathyroidism after surgery, chronically low calcium, unable to find enough calcium supplement to take to keep her calcium elevated.  However her problem now is more about a pain that started a year ago when she had a blood draw on her left arm and it caused pain going up her arm which has now led to some chronic pain in the left upper back region coming around towards the front in the anterior chest region with a really bad trigger point in the back.    Objective: She has good range of motion with the left arm and normal strength.  There is tenderness starting in the left upper back and coming around towards the breast on the left side.  The heart is regular without murmurs.  The lungs are clear.    Assessment and plan: I believe these are mechanical in nature and I think physical therapy would be very helpful to her or certainly is the way to start things off.  Referral done.

## 2022-05-24 ENCOUNTER — OFFICE VISIT (OUTPATIENT)
Dept: URGENT CARE | Facility: URGENT CARE | Age: 52
End: 2022-05-24
Payer: COMMERCIAL

## 2022-05-24 VITALS
RESPIRATION RATE: 20 BRPM | SYSTOLIC BLOOD PRESSURE: 119 MMHG | DIASTOLIC BLOOD PRESSURE: 69 MMHG | TEMPERATURE: 98 F | HEART RATE: 78 BPM | OXYGEN SATURATION: 98 %

## 2022-05-24 DIAGNOSIS — L50.9 URTICARIA: Primary | ICD-10-CM

## 2022-05-24 PROCEDURE — 99214 OFFICE O/P EST MOD 30 MIN: CPT | Performed by: PHYSICIAN ASSISTANT

## 2022-05-24 RX ORDER — METHYLPREDNISOLONE 4 MG
TABLET, DOSE PACK ORAL
Qty: 21 TABLET | Refills: 0 | Status: SHIPPED | OUTPATIENT
Start: 2022-05-24 | End: 2022-06-06

## 2022-05-24 NOTE — PROGRESS NOTES
SUBJECTIVE:  Delio Agrawal is a 52 year old female who presents to the clinic today for a rash.  Onset of rash was 1 day(s) ago.   Rash is gradual onset.  Location of the rash: face.  Quality/symptoms of rash: itching   Symptoms are mild and rash seems to be improving.  Previous history of a similar rash? No  Recent exposure history: peppermint soap    Associated symptoms include: nothing.    Denies swelling of lips, throat, tongue.  Denies difficulty swallowing or breathing    Past Medical History:   Diagnosis Date     Goiter      H. pylori infection 06/23/2021     Postoperative hypothyroidism 12/01/2017     Postsurgical hypoparathyroidism (H) 12/01/2017     Subclinical hyperthyroidism      Current Outpatient Medications   Medication Sig Dispense Refill     Cyanocobalamin (B-12) 1000 MCG TBCR Take 1,000 mcg by mouth daily 90 tablet 1     estradiol (ESTRACE) 0.1 MG/GM vaginal cream Place 2 g vaginally twice a week 42.5 g 1     levothyroxine (SYNTHROID/LEVOTHROID) 112 MCG tablet Take 1 tablet (112 mcg) by mouth daily 90 tablet 4     methylPREDNISolone (MEDROL DOSEPAK) 4 MG tablet therapy pack Follow Package Directions 21 tablet 0     progesterone (CRINONE) 4 % GEL Place 45 g vaginally daily 1.125 g 0     vitamin D3 (CHOLECALCIFEROL) 1000 units (25 mcg) tablet Take 5 tablets (5,000 Units) by mouth daily       calcium carbonate (TUMS) 500 MG chewable tablet Take 2 tablets (1,000 mg) by mouth daily (Patient not taking: No sig reported) 150 tablet 3     ibuprofen (ADVIL/MOTRIN) 800 MG tablet Take 1 tablet (800 mg) by mouth every 6 hours as needed for moderate pain (Patient not taking: No sig reported) 20 tablet 0     oxyCODONE-acetaminophen (PERCOCET) 5-325 MG tablet Take 1-2 tablets by mouth every 4 hours as needed for pain (Patient not taking: No sig reported) 12 tablet 0     valACYclovir (VALTREX) 1000 mg tablet Take 1 tablet (1,000 mg) by mouth 3 times daily for 7 days 21 tablet 0     Social History     Tobacco  Use     Smoking status: Never Smoker     Smokeless tobacco: Never Used   Substance Use Topics     Alcohol use: No       ROS:  Review of systems negative except as stated above.    EXAM:   /69   Pulse 78   Temp 98  F (36.7  C)   Resp 20   LMP 09/23/2019   SpO2 98%   GENERAL: alert, no acute distress.  SKIN: mild swelling of cheeks and forehead  EYEs: conjunctiva clear  RESP: lungs clear to auscultation - no rales, rhonchi or wheezes  CV: regular rates and rhythm, normal S1 S2, no murmur noted  NEURO: Normal strength and tone, sensory exam grossly normal,  normal speech and mentation    ASSESSMENT:  (L50.9) Urticaria  (primary encounter diagnosis)  Comment: trial enadryle and elevated bed tonight, begin steroid if symptoms persist  Plan: methylPREDNISolone (MEDROL DOSEPAK) 4 MG tablet        therapy pack        Red flags and emergent follow up discussed, and understood by patient  Follow up with PCP if symptoms worsen or fail to improve

## 2022-05-26 ENCOUNTER — OFFICE VISIT (OUTPATIENT)
Dept: FAMILY MEDICINE | Facility: CLINIC | Age: 52
End: 2022-05-26
Payer: COMMERCIAL

## 2022-05-26 VITALS
SYSTOLIC BLOOD PRESSURE: 101 MMHG | BODY MASS INDEX: 25.72 KG/M2 | TEMPERATURE: 98 F | WEIGHT: 136.12 LBS | OXYGEN SATURATION: 98 % | DIASTOLIC BLOOD PRESSURE: 67 MMHG | HEART RATE: 66 BPM

## 2022-05-26 DIAGNOSIS — E89.2 POST-SURGICAL HYPOPARATHYROIDISM (H): Primary | ICD-10-CM

## 2022-05-26 DIAGNOSIS — Z00.00 HEALTHCARE MAINTENANCE: ICD-10-CM

## 2022-05-26 DIAGNOSIS — E83.51 HYPOCALCEMIA: ICD-10-CM

## 2022-05-26 DIAGNOSIS — F43.21 GRIEF REACTION: ICD-10-CM

## 2022-05-26 DIAGNOSIS — Z13.220 SCREENING FOR HYPERLIPIDEMIA: ICD-10-CM

## 2022-05-26 PROCEDURE — 80061 LIPID PANEL: CPT | Performed by: FAMILY MEDICINE

## 2022-05-26 PROCEDURE — 99214 OFFICE O/P EST MOD 30 MIN: CPT | Performed by: FAMILY MEDICINE

## 2022-05-26 PROCEDURE — 82306 VITAMIN D 25 HYDROXY: CPT | Performed by: FAMILY MEDICINE

## 2022-05-26 PROCEDURE — 80048 BASIC METABOLIC PNL TOTAL CA: CPT | Performed by: FAMILY MEDICINE

## 2022-05-26 PROCEDURE — 84100 ASSAY OF PHOSPHORUS: CPT | Performed by: FAMILY MEDICINE

## 2022-05-26 PROCEDURE — 36415 COLL VENOUS BLD VENIPUNCTURE: CPT | Performed by: FAMILY MEDICINE

## 2022-05-26 NOTE — PATIENT INSTRUCTIONS
Rash -   Using zyrtec during the day  Ok to use benadryl at night, do not drive while taking the medication

## 2022-05-26 NOTE — PROGRESS NOTES
Assessment & Plan     Post-surgical hypoparathyroidism  Hypocalcemia  - increasing calcium intake  - not currently taking vitamin D  - wants to have labs rechecked   - Basic metabolic panel  (Ca, Cl, CO2, Creat, Gluc, K, Na, BUN); Future  - Phosphorus; Future  - Basic metabolic panel  (Ca, Cl, CO2, Creat, Gluc, K, Na, BUN)  - Phosphorus  - Vitamin D Deficiency; Future  - Vitamin D Deficiency    Screening for hyperlipidemia  - Lipid Profile (Chol, Trig, HDL, LDL calc); Future  - Lipid Profile (Chol, Trig, HDL, LDL calc)    Grief reaction  - Lost her brother in [place] 3 weeks ago.  supportive.   - Will go back to [place] in Aug for three months.    Health care maintenance   Breast cancer screening- will schedule mammogram  Up to date with tetanus booster.  Declines shingles vaccine, recently had shingles.     Gilbert Palacio MD  [facility] PELON Kebede is a 52 year old who presents for the following health issues     History of Present Illness       Reason for visit:  Check up    She eats 2-3 servings of fruits and vegetables daily.She consumes 0 sweetened beverage(s) daily.She exercises with enough effort to increase her heart rate 9 or less minutes per day.  She exercises with enough effort to increase her heart rate 3 or less days per week.   She is taking medications regularly.       She used organic shampoo bar. Her face got swollen and redness in her face.   Swelling and redness in the face have improved.   She used benadryl three pills which have helped.   She is now using Dove soap.   She endorses facial itching.     Calcium - she is increasing more calcium in diet. Calcium 1, 300 mg per day. While in [place] she started experiencing tingling and then increased calcium intake as above. She ate a lot of calcium rich products. She feels much better.     Review of Systems         Objective    /67 (BP Location: Right arm, Patient Position: Chair, Cuff  Size: Adult Regular)   Pulse 66   Temp 98  F (36.7  C) (Temporal)   Wt 61.7 kg (136 lb 1.9 oz)   LMP 09/23/2019   SpO2 98%   BMI 25.72 kg/m    Body mass index is 25.72 kg/m .  Physical Exam

## 2022-05-27 LAB
ANION GAP SERPL CALCULATED.3IONS-SCNC: 6 MMOL/L (ref 3–14)
BUN SERPL-MCNC: 11 MG/DL (ref 7–30)
CALCIUM SERPL-MCNC: 8 MG/DL (ref 8.5–10.1)
CHLORIDE BLD-SCNC: 104 MMOL/L (ref 94–109)
CHOLEST SERPL-MCNC: 191 MG/DL
CO2 SERPL-SCNC: 31 MMOL/L (ref 20–32)
CREAT SERPL-MCNC: 0.74 MG/DL (ref 0.52–1.04)
DEPRECATED CALCIDIOL+CALCIFEROL SERPL-MC: 74 UG/L (ref 20–75)
FASTING STATUS PATIENT QL REPORTED: NO
GFR SERPL CREATININE-BSD FRML MDRD: >90 ML/MIN/1.73M2
GLUCOSE BLD-MCNC: 96 MG/DL (ref 70–99)
HDLC SERPL-MCNC: 66 MG/DL
LDLC SERPL CALC-MCNC: 98 MG/DL
NONHDLC SERPL-MCNC: 125 MG/DL
PHOSPHATE SERPL-MCNC: 5.1 MG/DL (ref 2.5–4.5)
POTASSIUM BLD-SCNC: 4.1 MMOL/L (ref 3.4–5.3)
SODIUM SERPL-SCNC: 141 MMOL/L (ref 133–144)
TRIGL SERPL-MCNC: 135 MG/DL

## 2022-06-06 ENCOUNTER — OFFICE VISIT (OUTPATIENT)
Dept: URGENT CARE | Facility: URGENT CARE | Age: 52
End: 2022-06-06
Payer: COMMERCIAL

## 2022-06-06 VITALS
TEMPERATURE: 98.9 F | DIASTOLIC BLOOD PRESSURE: 58 MMHG | RESPIRATION RATE: 16 BRPM | OXYGEN SATURATION: 100 % | SYSTOLIC BLOOD PRESSURE: 94 MMHG | HEART RATE: 71 BPM

## 2022-06-06 DIAGNOSIS — L42 PITYRIASIS ROSEA: Primary | ICD-10-CM

## 2022-06-06 PROCEDURE — 99213 OFFICE O/P EST LOW 20 MIN: CPT | Performed by: PHYSICIAN ASSISTANT

## 2022-06-06 NOTE — PROGRESS NOTES
SUBJECTIVE:  Delio Agrawal is a 52 year old female who presents to the clinic today for a rash.  Patient states she has had a rash for about 2 weeks.  Started as a single oval large lesion on her left torso she has several other scattered smaller similar lesions that are now scattered around her torso.  She does state slightly itchy and seems to have a little bit of scaling borders.  She is worried if she has shingles again as she had this 7 months ago.  The rash is not painful.  She denies any fevers.  There is no URI or symptoms    Associated symptoms include: Denies symptoms of fever, throat tightness, wheezing, cough, tongue/lip swelling, shortness of breath, joint pain, nausea, vomiting, rhinorrhea, sore throat and URI symptoms.    Past Medical History:   Diagnosis Date     Goiter      H. pylori infection 06/23/2021     Postoperative hypothyroidism 12/01/2017     Postsurgical hypoparathyroidism (H) 12/01/2017     Subclinical hyperthyroidism      Current Outpatient Medications   Medication Sig Dispense Refill     Cyanocobalamin (B-12) 1000 MCG TBCR Take 1,000 mcg by mouth daily 90 tablet 1     levothyroxine (SYNTHROID/LEVOTHROID) 112 MCG tablet Take 1 tablet (112 mcg) by mouth daily 90 tablet 4     vitamin D3 (CHOLECALCIFEROL) 1000 units (25 mcg) tablet Take 5 tablets (5,000 Units) by mouth daily       valACYclovir (VALTREX) 1000 mg tablet Take 1 tablet (1,000 mg) by mouth 3 times daily for 7 days 21 tablet 0     Social History     Tobacco Use     Smoking status: Never Smoker     Smokeless tobacco: Never Used   Substance Use Topics     Alcohol use: No       ROS:  Review of systems negative except as stated above.    EXAM:   BP 94/58 (BP Location: Right arm, Patient Position: Sitting, Cuff Size: Adult Regular)   Pulse 71   Temp 98.9  F (37.2  C) (Tympanic)   Resp 16   LMP 09/23/2019   SpO2 100%   GENERAL: alert, no acute distress.  SKIN: Scattered skin colored oval lesions on the torso.  There is fine  scaling over the edges but they are not raised.  She does have 1 single large lesion on her torso consistent with herald patch.  There is no erythema or warmth to the touch to suggest infection.  Patient is her bilaterally is not shingles  GENERAL APPEARANCE: healthy, alert and no distress  EYES: EOMI,  PERRL, conjunctiva clear  NECK: supple, non-tender to palpation, no adenopathy noted  RESP: lungs clear to auscultation - no rales, rhonchi or wheezes  CV: regular rates and rhythm, normal S1 S2, no murmur noted    assessment/plan:  (L42) Pityriasis rosea  (primary encounter diagnosis)  Comment:   Plan: Patient with a 2-week history of scattered oval lesions on her torso.  She does have a single large oval patch that was her first lesion noted and now has scattered smaller lesions over her entire torso.  They are mildly itchy and slight scaling of the borders.  There is no signs of infection noticed rash is most consistent with pityriasis rosea.  Handout was given and reviewed and patient was reassured that this is not a serious condition.  Supportive cares as needed.  Rash will resolve on its own over the next several weeks.  She is to follow-up with primary if symptoms worsen or new symptoms develop

## 2022-06-15 DIAGNOSIS — E89.0 POSTSURGICAL HYPOTHYROIDISM: ICD-10-CM

## 2022-06-15 RX ORDER — LEVOTHYROXINE SODIUM 112 UG/1
112 TABLET ORAL DAILY
Qty: 90 TABLET | Refills: 4 | Status: SHIPPED | OUTPATIENT
Start: 2022-06-15 | End: 2023-07-20

## 2022-06-15 NOTE — TELEPHONE ENCOUNTER
levothyroxine (SYNTHROID/LEVOTHROID) 112 MCG tablet  Last Written Prescription Date:   4/19/2021  Last Fill Quantity: 90,   # refills: 4  Last Office Visit :  11/23/2021  Future Office visit: None    Routing refill request to provider for review/approval because:  Per Protocol,  Refer all refills to Provider for review       Julianne Ryan RN  Central Triage Red Flags/Med Refills

## 2022-06-15 NOTE — TELEPHONE ENCOUNTER
[place] and requesting a 3 month supply.  As of today they are all out of medication.

## 2022-07-27 ENCOUNTER — OFFICE VISIT (OUTPATIENT)
Dept: URGENT CARE | Facility: URGENT CARE | Age: 52
End: 2022-07-27
Payer: COMMERCIAL

## 2022-07-27 VITALS
HEART RATE: 74 BPM | DIASTOLIC BLOOD PRESSURE: 65 MMHG | RESPIRATION RATE: 20 BRPM | SYSTOLIC BLOOD PRESSURE: 105 MMHG | OXYGEN SATURATION: 98 % | TEMPERATURE: 98 F

## 2022-07-27 DIAGNOSIS — H61.21 IMPACTED CERUMEN OF RIGHT EAR: Primary | ICD-10-CM

## 2022-07-27 PROCEDURE — 99213 OFFICE O/P EST LOW 20 MIN: CPT | Performed by: PHYSICIAN ASSISTANT

## 2022-07-28 NOTE — PROGRESS NOTES
Assessment & Plan     1. Impacted cerumen of right ear  Removed in clinic by nurse without problem.  Likely the cause of plugged feeling in ear, advised follow-up with PCP if not improving as expected.         Return in about 1 week (around 8/3/2022), or if symptoms worsen or fail to improve.    Diagnosis and treatment plan was reviewed with patient and/or family.   We went over any labs or imaging. Discussed worsening symptoms or little to no relief despite treatment plan to follow-up with PCP or return to clinic.  Patient verbalizes understanding. All questions were addressed and answered.     Bryanna Figueroa PA-C  [facility] [place]    CHIEF COMPLAINT:   Chief Complaint   Patient presents with     Ear Problem     Poss ear infection      Subjective     Delio is a 52 year old female who presents to clinic today for evaluation of possible ear infection. Started several months ago. Feels intermittently plugged. Patient denies fever, chills, drainage from ears, tinnitus, pain on the outer ear, recent URI symptoms or recent swimming.        Past Medical History:   Diagnosis Date     Goiter      H. pylori infection 06/23/2021     Postoperative hypothyroidism 12/01/2017     Postsurgical hypoparathyroidism (H) 12/01/2017     Subclinical hyperthyroidism      Past Surgical History:   Procedure Laterality Date     THYROIDECTOMY N/A 12/01/2017    Procedure: THYROIDECTOMY;  Total Thyroidectomy of substernal goiter;  Surgeon: Keyla Griffin MD;  Location:  OR     US BREAST BIOPSY RT  2017     Social History     Tobacco Use     Smoking status: Never Smoker     Smokeless tobacco: Never Used   Substance Use Topics     Alcohol use: No     Current Outpatient Medications   Medication     Cyanocobalamin (B-12) 1000 MCG TBCR     levothyroxine (SYNTHROID/LEVOTHROID) 112 MCG tablet     vitamin D3 (CHOLECALCIFEROL) 1000 units (25 mcg) tablet     valACYclovir (VALTREX) 1000 mg tablet     No current  facility-administered medications for this visit.     Allergies   Allergen Reactions     Flexeril [Cyclobenzaprine] Shortness Of Breath     Percocet [Oxycodone-Acetaminophen] Difficulty breathing       10 point ROS of systems were all negative except for pertinent positives noted in my HPI.      Exam:   /65   Pulse 74   Temp 98  F (36.7  C) (Tympanic)   Resp 20   LMP 09/23/2019   SpO2 98%   Constitutional: healthy, alert and no distress  Head: Normocephalic, atraumatic.  Eyes: conjunctiva clear, no drainage  ENT: L TM normal. R TM obstructed with cerumen. nasal mucosa pink and moist, throat without tonsillar hypertrophy or erythema  Neck: neck is supple, no cervical lymphadenopathy or nuchal rigidity  Cardiovascular: RRR  Respiratory: CTA bilaterally, no rhonchi or rales  Skin: no rashes  Neurologic: Speech clear, gait normal. Moves all extremities.    No results found for any visits on 07/27/22.

## 2022-09-11 ENCOUNTER — HEALTH MAINTENANCE LETTER (OUTPATIENT)
Age: 52
End: 2022-09-11

## 2022-11-09 ENCOUNTER — OFFICE VISIT (OUTPATIENT)
Dept: URGENT CARE | Facility: URGENT CARE | Age: 52
End: 2022-11-09
Payer: COMMERCIAL

## 2022-11-09 VITALS
HEART RATE: 69 BPM | RESPIRATION RATE: 16 BRPM | OXYGEN SATURATION: 100 % | SYSTOLIC BLOOD PRESSURE: 108 MMHG | TEMPERATURE: 98.6 F | DIASTOLIC BLOOD PRESSURE: 83 MMHG | BODY MASS INDEX: 27.02 KG/M2 | WEIGHT: 143 LBS

## 2022-11-09 DIAGNOSIS — R30.0 DYSURIA: Primary | ICD-10-CM

## 2022-11-09 DIAGNOSIS — N30.01 ACUTE CYSTITIS WITH HEMATURIA: ICD-10-CM

## 2022-11-09 LAB
ALBUMIN UR-MCNC: NEGATIVE MG/DL
APPEARANCE UR: CLEAR
BACTERIA #/AREA URNS HPF: ABNORMAL /HPF
BILIRUB UR QL STRIP: NEGATIVE
COLOR UR AUTO: YELLOW
GLUCOSE UR STRIP-MCNC: NEGATIVE MG/DL
HGB UR QL STRIP: ABNORMAL
KETONES UR STRIP-MCNC: NEGATIVE MG/DL
LEUKOCYTE ESTERASE UR QL STRIP: ABNORMAL
NITRATE UR QL: NEGATIVE
PH UR STRIP: 7.5 [PH] (ref 5–7)
RBC #/AREA URNS AUTO: ABNORMAL /HPF
SP GR UR STRIP: 1.01 (ref 1–1.03)
SQUAMOUS #/AREA URNS AUTO: ABNORMAL /LPF
UROBILINOGEN UR STRIP-ACNC: 0.2 E.U./DL
WBC #/AREA URNS AUTO: >100 /HPF

## 2022-11-09 PROCEDURE — 87086 URINE CULTURE/COLONY COUNT: CPT | Performed by: PHYSICIAN ASSISTANT

## 2022-11-09 PROCEDURE — 87186 SC STD MICRODIL/AGAR DIL: CPT | Performed by: PHYSICIAN ASSISTANT

## 2022-11-09 PROCEDURE — 81001 URINALYSIS AUTO W/SCOPE: CPT | Performed by: PHYSICIAN ASSISTANT

## 2022-11-09 PROCEDURE — 87088 URINE BACTERIA CULTURE: CPT | Performed by: PHYSICIAN ASSISTANT

## 2022-11-09 PROCEDURE — 99213 OFFICE O/P EST LOW 20 MIN: CPT | Performed by: PHYSICIAN ASSISTANT

## 2022-11-09 RX ORDER — CEPHALEXIN 500 MG/1
500 CAPSULE ORAL 2 TIMES DAILY
Qty: 14 CAPSULE | Refills: 0 | Status: SHIPPED | OUTPATIENT
Start: 2022-11-09 | End: 2022-11-16

## 2022-11-09 RX ORDER — PHENAZOPYRIDINE HYDROCHLORIDE 100 MG/1
100 TABLET, FILM COATED ORAL 3 TIMES DAILY PRN
Qty: 6 TABLET | Refills: 0 | Status: SHIPPED | OUTPATIENT
Start: 2022-11-09 | End: 2023-09-07

## 2022-11-09 NOTE — PROGRESS NOTES
Assessment & Plan     1. Dysuria  - UA macro with reflex to Microscopic and Culture - Clinc Collect  - Urine Microscopic Exam  - Urine Culture    2. Acute cystitis with hematuria  UA is grossly positive, no evidence of pyelonephritis, urosepsis or nephrolithiasis  Treatment with medication as below while awaiting culture  Fluids  Discussed S&S of pyelonephritis and return precautions  - cephALEXin (KEFLEX) 500 MG capsule; Take 1 capsule (500 mg) by mouth 2 times daily for 7 days  Dispense: 14 capsule; Refill: 0  - phenazopyridine (PYRIDIUM) 100 MG tablet; Take 1 tablet (100 mg) by mouth 3 times daily as needed for urinary tract discomfort  Dispense: 6 tablet; Refill: 0        Return in about 2 days (around 11/11/2022), or if symptoms worsen or fail to improve.    Diagnosis and treatment plan was reviewed with patient and/or family.   We went over any labs or imaging. Discussed worsening symptoms or little to no relief despite treatment plan to follow-up with PCP or return to clinic.  Patient verbalizes understanding. All questions were addressed and answered.     Bryanna Figueroa PA-C  [facility] [place]    CHIEF COMPLAINT:   Chief Complaint   Patient presents with     Dysuria     X2 days       Subjective     Delio is a 52 year old female who presents to clinic today for evaluation of possibly utu. Burning with urination and urinary frequency have been present for the past 2 days. Noticed small amount of hematuria today. Denies having fever, chills, flank pain, nausea, vomiting or weakness.  Vaginal discharge, itching or pain are not present.     Past Medical History:   Diagnosis Date     Goiter      H. pylori infection 06/23/2021     Postoperative hypothyroidism 12/01/2017     Postsurgical hypoparathyroidism (H) 12/01/2017     Subclinical hyperthyroidism      Past Surgical History:   Procedure Laterality Date     THYROIDECTOMY N/A 12/01/2017    Procedure: THYROIDECTOMY;  Total Thyroidectomy of  substernal goiter;  Surgeon: Keyla Griffin MD;  Location: [facility] OR     US BREAST BIOPSY RT  2017     Social History     Tobacco Use     Smoking status: Never     Smokeless tobacco: Never   Substance Use Topics     Alcohol use: No     Current Outpatient Medications   Medication     Calcium Carbonate-Vit D-Min (CALCIUM 1200 PO)     cephALEXin (KEFLEX) 500 MG capsule     Cyanocobalamin (B-12) 1000 MCG TBCR     levothyroxine (SYNTHROID/LEVOTHROID) 112 MCG tablet     phenazopyridine (PYRIDIUM) 100 MG tablet     vitamin D3 (CHOLECALCIFEROL) 1000 units (25 mcg) tablet     No current facility-administered medications for this visit.     Allergies   Allergen Reactions     Flexeril [Cyclobenzaprine] Shortness Of Breath     Percocet [Oxycodone-Acetaminophen] Difficulty breathing       10 point ROS of systems were all negative except for pertinent positives noted in my HPI.      Exam:   /83 (BP Location: Right arm, Patient Position: Sitting, Cuff Size: Adult Regular)   Pulse 69   Temp 98.6  F (37  C) (Oral)   Resp 16   Wt 64.9 kg (143 lb)   LMP 09/23/2019   SpO2 100%   BMI 27.02 kg/m    Constitutional: healthy, alert and no distress  Cardiovascular: RRR  Respiratory: CTA bilaterally, no rhonchi or rales  Gastrointestinal: soft and nontender  Back: No CVA tenderness B/L  Skin: no rashes      Results for orders placed or performed in visit on 11/09/22   UA macro with reflex to Microscopic and Culture - Clinc Collect     Status: Abnormal    Specimen: Urine, Clean Catch   Result Value Ref Range    Color Urine Yellow Colorless, Straw, Light Yellow, Yellow    Appearance Urine Clear Clear    Glucose Urine Negative Negative, 1000 , >=2000 mg/dL    Bilirubin Urine Negative Negative    Ketones Urine Negative Negative, 160  mg/dL    Specific Gravity Urine 1.015 1.003 - 1.035    Blood Urine Moderate (A) Negative    pH Urine 7.5 (H) 5.0 - 7.0    Protein Albumin Urine Negative Negative, 300 , >=2000 mg/dL    Urobilinogen  Urine 0.2 0.2, 1.0 E.U./dL    Nitrite Urine Negative Negative    Leukocyte Esterase Urine Large (A) Negative   Urine Microscopic Exam     Status: Abnormal   Result Value Ref Range    Bacteria Urine Many (A) None Seen /HPF    RBC Urine 2-5 (A) 0-2 /HPF /HPF    WBC Urine >100 (A) 0-5 /HPF /HPF    Squamous Epithelials Urine Few (A) None Seen /LPF

## 2022-11-12 LAB — BACTERIA UR CULT: ABNORMAL

## 2022-12-12 ENCOUNTER — ANCILLARY PROCEDURE (OUTPATIENT)
Dept: MAMMOGRAPHY | Facility: CLINIC | Age: 52
End: 2022-12-12
Attending: ADVANCED PRACTICE MIDWIFE
Payer: COMMERCIAL

## 2022-12-12 DIAGNOSIS — Z12.31 ENCOUNTER FOR SCREENING MAMMOGRAM FOR BREAST CANCER: ICD-10-CM

## 2022-12-12 PROCEDURE — 77063 BREAST TOMOSYNTHESIS BI: CPT | Mod: GC | Performed by: RADIOLOGY

## 2022-12-12 PROCEDURE — 77067 SCR MAMMO BI INCL CAD: CPT | Mod: GC | Performed by: RADIOLOGY

## 2022-12-19 ENCOUNTER — LAB (OUTPATIENT)
Dept: LAB | Facility: CLINIC | Age: 52
End: 2022-12-19
Payer: COMMERCIAL

## 2022-12-19 DIAGNOSIS — Z11.4 SCREENING FOR HIV (HUMAN IMMUNODEFICIENCY VIRUS): ICD-10-CM

## 2022-12-19 DIAGNOSIS — E83.51 HYPOCALCEMIA: ICD-10-CM

## 2022-12-19 DIAGNOSIS — Z11.59 NEED FOR HEPATITIS C SCREENING TEST: ICD-10-CM

## 2022-12-19 DIAGNOSIS — E89.2 POST-SURGICAL HYPOPARATHYROIDISM (H): ICD-10-CM

## 2022-12-19 LAB
ANION GAP SERPL CALCULATED.3IONS-SCNC: 14 MMOL/L (ref 7–15)
BUN SERPL-MCNC: 17.1 MG/DL (ref 6–20)
CALCIUM SERPL-MCNC: 7.9 MG/DL (ref 8.6–10)
CHLORIDE SERPL-SCNC: 100 MMOL/L (ref 98–107)
CREAT SERPL-MCNC: 0.83 MG/DL (ref 0.51–0.95)
DEPRECATED HCO3 PLAS-SCNC: 27 MMOL/L (ref 22–29)
GFR SERPL CREATININE-BSD FRML MDRD: 84 ML/MIN/1.73M2
GLUCOSE SERPL-MCNC: 75 MG/DL (ref 70–99)
HCV AB SERPL QL IA: NONREACTIVE
HIV 1+2 AB+HIV1 P24 AG SERPL QL IA: NONREACTIVE
POTASSIUM SERPL-SCNC: 4 MMOL/L (ref 3.4–5.3)
SODIUM SERPL-SCNC: 141 MMOL/L (ref 136–145)

## 2022-12-19 PROCEDURE — 36415 COLL VENOUS BLD VENIPUNCTURE: CPT

## 2022-12-19 PROCEDURE — 80048 BASIC METABOLIC PNL TOTAL CA: CPT

## 2022-12-19 PROCEDURE — 87389 HIV-1 AG W/HIV-1&-2 AB AG IA: CPT

## 2022-12-19 PROCEDURE — 86803 HEPATITIS C AB TEST: CPT

## 2023-04-30 ENCOUNTER — HEALTH MAINTENANCE LETTER (OUTPATIENT)
Age: 53
End: 2023-04-30

## 2023-05-02 ENCOUNTER — OFFICE VISIT (OUTPATIENT)
Dept: URGENT CARE | Facility: URGENT CARE | Age: 53
End: 2023-05-02
Payer: COMMERCIAL

## 2023-05-02 VITALS
DIASTOLIC BLOOD PRESSURE: 66 MMHG | TEMPERATURE: 98.1 F | BODY MASS INDEX: 25.83 KG/M2 | SYSTOLIC BLOOD PRESSURE: 106 MMHG | HEART RATE: 70 BPM | WEIGHT: 136.69 LBS | OXYGEN SATURATION: 100 %

## 2023-05-02 DIAGNOSIS — E89.0 H/O THYROIDECTOMY: ICD-10-CM

## 2023-05-02 DIAGNOSIS — R10.30 LOWER ABDOMINAL PAIN: Primary | ICD-10-CM

## 2023-05-02 DIAGNOSIS — R10.2 PELVIC PAIN IN FEMALE: ICD-10-CM

## 2023-05-02 LAB
ALBUMIN UR-MCNC: NEGATIVE MG/DL
APPEARANCE UR: CLEAR
BILIRUB UR QL STRIP: NEGATIVE
CLUE CELLS: NORMAL
COLOR UR AUTO: YELLOW
GLUCOSE UR STRIP-MCNC: NEGATIVE MG/DL
HGB UR QL STRIP: ABNORMAL
KETONES UR STRIP-MCNC: NEGATIVE MG/DL
LEUKOCYTE ESTERASE UR QL STRIP: NEGATIVE
NITRATE UR QL: NEGATIVE
PH UR STRIP: 7.5 [PH] (ref 5–7)
RBC #/AREA URNS AUTO: NORMAL /HPF
SP GR UR STRIP: 1.01 (ref 1–1.03)
TRICHOMONAS, WET PREP: NORMAL
UROBILINOGEN UR STRIP-ACNC: 0.2 E.U./DL
WBC #/AREA URNS AUTO: NORMAL /HPF
WBC'S/HIGH POWER FIELD, WET PREP: NORMAL
YEAST, WET PREP: NORMAL

## 2023-05-02 PROCEDURE — 36415 COLL VENOUS BLD VENIPUNCTURE: CPT | Performed by: NURSE PRACTITIONER

## 2023-05-02 PROCEDURE — 80048 BASIC METABOLIC PNL TOTAL CA: CPT | Performed by: NURSE PRACTITIONER

## 2023-05-02 PROCEDURE — 85025 COMPLETE CBC W/AUTO DIFF WBC: CPT | Performed by: NURSE PRACTITIONER

## 2023-05-02 PROCEDURE — 99214 OFFICE O/P EST MOD 30 MIN: CPT | Performed by: NURSE PRACTITIONER

## 2023-05-02 PROCEDURE — 81001 URINALYSIS AUTO W/SCOPE: CPT | Performed by: NURSE PRACTITIONER

## 2023-05-02 PROCEDURE — 87210 SMEAR WET MOUNT SALINE/INK: CPT | Performed by: NURSE PRACTITIONER

## 2023-05-03 LAB
ANION GAP SERPL CALCULATED.3IONS-SCNC: 13 MMOL/L (ref 7–15)
BASOPHILS # BLD AUTO: 0 10E3/UL (ref 0–0.2)
BASOPHILS NFR BLD AUTO: 1 %
BUN SERPL-MCNC: 18.9 MG/DL (ref 6–20)
CALCIUM SERPL-MCNC: 7.4 MG/DL (ref 8.6–10)
CALCIUM SERPL-MCNC: 7.4 MG/DL (ref 8.6–10)
CHLORIDE SERPL-SCNC: 100 MMOL/L (ref 98–107)
CREAT SERPL-MCNC: 0.93 MG/DL (ref 0.51–0.95)
DEPRECATED HCO3 PLAS-SCNC: 29 MMOL/L (ref 22–29)
EOSINOPHIL # BLD AUTO: 0.1 10E3/UL (ref 0–0.7)
EOSINOPHIL NFR BLD AUTO: 1 %
ERYTHROCYTE [DISTWIDTH] IN BLOOD BY AUTOMATED COUNT: 13.2 % (ref 10–15)
GFR SERPL CREATININE-BSD FRML MDRD: 74 ML/MIN/1.73M2
GLUCOSE SERPL-MCNC: 77 MG/DL (ref 70–99)
HCT VFR BLD AUTO: 39.5 % (ref 35–47)
HGB BLD-MCNC: 12.7 G/DL (ref 11.7–15.7)
IMM GRANULOCYTES # BLD: 0 10E3/UL
IMM GRANULOCYTES NFR BLD: 0 %
LYMPHOCYTES # BLD AUTO: 1.6 10E3/UL (ref 0.8–5.3)
LYMPHOCYTES NFR BLD AUTO: 30 %
MCH RBC QN AUTO: 30.2 PG (ref 26.5–33)
MCHC RBC AUTO-ENTMCNC: 32.2 G/DL (ref 31.5–36.5)
MCV RBC AUTO: 94 FL (ref 78–100)
MONOCYTES # BLD AUTO: 0.4 10E3/UL (ref 0–1.3)
MONOCYTES NFR BLD AUTO: 8 %
NEUTROPHILS # BLD AUTO: 3.1 10E3/UL (ref 1.6–8.3)
NEUTROPHILS NFR BLD AUTO: 60 %
NRBC # BLD AUTO: 0 10E3/UL
NRBC BLD AUTO-RTO: 0 /100
PLATELET # BLD AUTO: 168 10E3/UL (ref 150–450)
POTASSIUM SERPL-SCNC: 4.1 MMOL/L (ref 3.4–5.3)
RBC # BLD AUTO: 4.2 10E6/UL (ref 3.8–5.2)
SODIUM SERPL-SCNC: 142 MMOL/L (ref 136–145)
WBC # BLD AUTO: 5.1 10E3/UL (ref 4–11)

## 2023-05-03 NOTE — PATIENT INSTRUCTIONS
Lower abdominal pain differentials include constipation gas pains bladder irritant OAB endometriosis ovarian cyst pelvic mass  Urine is unremarkable for UTI kidney stone  Wet prep negative for BV yeast  No obvious appendicitis or torsion signs, but cannot rule out without imaging  Blood work pending, we call for abnormals next day or so  Patient and  understand limitation of urgent care without advanced imaging  Recommend Tylenol ibuprofen hot pack water avoid excess bubbly tea citrus coffee chocolate  They want to hold on urogyn referral  ER if worsening pain any fever over 100 blood flank pain nausea vomiting

## 2023-05-03 NOTE — PROGRESS NOTES
Chief Complaint   Patient presents with     Urgent Care     Abdominal Pain     Pt in clinic to have eval for abdominal pain for 5 days.     SUBJECTIVE:  Delio gArawal is a 52 year old female who presents to the clinic today with her  with 5/10 intermittent lower abdominal pelvic bladder pressure pain sensation to urinate frequently for 5 days.  Declines fever sweats chills nausea vomiting flank pain dysuria hematuria vaginal discharge itching swelling.  She has been postmenopausal since 2018.  No relevant urogyn history such as ovarian cyst endometriosis surgeries.  Having normal daily bowel movements.  Had a normal CT abdomen pelvis last year.  She does drink tea daily.   is also requesting that her calcium be checked as she is on daily calcium for thyroidectomy.    Past Medical History:   Diagnosis Date     Goiter      H. pylori infection 06/23/2021     Postoperative hypothyroidism 12/01/2017     Postsurgical hypoparathyroidism (H) 12/01/2017     Subclinical hyperthyroidism      Calcium Carbonate-Vit D-Min (CALCIUM 1200 PO),   Cyanocobalamin (B-12) 1000 MCG TBCR, Take 1,000 mcg by mouth daily  levothyroxine (SYNTHROID/LEVOTHROID) 112 MCG tablet, Take 1 tablet (112 mcg) by mouth daily  phenazopyridine (PYRIDIUM) 100 MG tablet, Take 1 tablet (100 mg) by mouth 3 times daily as needed for urinary tract discomfort  vitamin D3 (CHOLECALCIFEROL) 1000 units (25 mcg) tablet, Take 5 tablets (5,000 Units) by mouth daily    No current facility-administered medications on file prior to visit.    Social History     Tobacco Use     Smoking status: Never     Smokeless tobacco: Never   Vaping Use     Vaping status: Not on file   Substance Use Topics     Alcohol use: No     Allergies   Allergen Reactions     Flexeril [Cyclobenzaprine] Shortness Of Breath     Percocet [Oxycodone-Acetaminophen] Difficulty breathing       Review of Systems   All systems negative except for those listed above in HPI.    EXAM:   BP  106/66   Pulse 70   Temp 98.1  F (36.7  C) (Oral)   Wt 62 kg (136 lb 11 oz)   LMP 09/23/2019   SpO2 100%   BMI 25.83 kg/m      Physical Exam  Vitals reviewed.   Constitutional:       General: She is not in acute distress.     Appearance: Normal appearance. She is well-developed. She is not ill-appearing, toxic-appearing or diaphoretic.   HENT:      Head: Normocephalic and atraumatic.   Eyes:      Conjunctiva/sclera: Conjunctivae normal.      Pupils: Pupils are equal, round, and reactive to light.   Cardiovascular:      Rate and Rhythm: Normal rate.      Pulses: Normal pulses.   Pulmonary:      Effort: Pulmonary effort is normal. No respiratory distress.   Abdominal:      General: There is no distension.      Palpations: There is no mass.      Tenderness: There is abdominal tenderness (bladder pelvic mild tenderness). There is no right CVA tenderness, left CVA tenderness, guarding or rebound.      Hernia: No hernia is present.      Comments: Negative Rovsing's obturator psoas McBurney signs.   Musculoskeletal:         General: Normal range of motion.      Cervical back: Normal range of motion and neck supple.   Skin:     General: Skin is warm.      Capillary Refill: Capillary refill takes less than 2 seconds.      Coloration: Skin is not jaundiced or pale.      Findings: No erythema or rash.   Neurological:      General: No focal deficit present.      Mental Status: She is alert and oriented to person, place, and time.   Psychiatric:         Mood and Affect: Mood normal.         Behavior: Behavior normal.       Initial CBC unremarkable with WBC of 4.87 and neutrophil count of 2.88, 59.2%.  Results for orders placed or performed in visit on 05/02/23   UA Macroscopic with reflex to Microscopic and Culture     Status: Abnormal    Specimen: Urine, Clean Catch   Result Value Ref Range    Color Urine Yellow Colorless, Straw, Light Yellow, Yellow    Appearance Urine Clear Clear    Glucose Urine Negative Negative mg/dL     Bilirubin Urine Negative Negative    Ketones Urine Negative Negative mg/dL    Specific Gravity Urine 1.015 1.003 - 1.035    Blood Urine Trace (A) Negative    pH Urine 7.5 (H) 5.0 - 7.0    Protein Albumin Urine Negative Negative mg/dL    Urobilinogen Urine 0.2 0.2, 1.0 E.U./dL    Nitrite Urine Negative Negative    Leukocyte Esterase Urine Negative Negative   UA Microscopic with Reflex to Culture     Status: Normal   Result Value Ref Range    RBC Urine None Seen 0-2 /HPF /HPF    WBC Urine None Seen 0-5 /HPF /HPF    Narrative    Urine Culture not indicated   Wet prep - Clinic Collect     Status: Normal    Specimen: Vagina; Swab   Result Value Ref Range    Trichomonas Absent Absent    Yeast Absent Absent    Clue Cells Absent Absent    WBCs/high power field None None    Narrative    VERY SCANTY  SAMPLE   CBC with platelets and differential     Status: None (In process)    Narrative    The following orders were created for panel order CBC with platelets and differential.  Procedure                               Abnormality         Status                     ---------                               -----------         ------                     CBC with platelets and d...[[id number]]                      In process                   Please view results for these tests on the individual orders.     ASSESSMENT:    ICD-10-CM    1. Lower abdominal pain  R10.30 UA Macroscopic with reflex to Microscopic and Culture     Wet prep - Clinic Collect     UA Microscopic with Reflex to Culture      2. Pelvic pain in female  R10.2 CBC with platelets and differential     Basic metabolic panel  (Ca, Cl, CO2, Creat, Gluc, K, Na, BUN)     CBC with platelets and differential     Basic metabolic panel  (Ca, Cl, CO2, Creat, Gluc, K, Na, BUN)      3. H/O thyroidectomy  E89.0 Calcium     Calcium        PLAN:    Lower abdominal pain differentials include constipation gas pains bladder irritant OAB endometriosis ovarian cyst pelvic mass  Urine is  unremarkable for UTI kidney stone  Wet prep negative for BV yeast  No obvious appendicitis or torsion signs, but cannot rule out without imaging  Blood work pending, we call for abnormals next day or so  Patient and  understand limitation of urgent care without advanced imaging  Recommend Tylenol ibuprofen hot pack water avoid excess bubbly tea citrus coffee chocolate  They want to hold on urogyn referral  ER if worsening pain any fever over 100 blood flank pain nausea vomiting    Follow up with primary care provider with any problems, questions or concerns or if symptoms worsen or fail to improve. Patient agreed to plan and verbalized understanding.    Milli Fink, [facility] [place]

## 2023-05-30 ENCOUNTER — OFFICE VISIT (OUTPATIENT)
Dept: URGENT CARE | Facility: URGENT CARE | Age: 53
End: 2023-05-30
Payer: COMMERCIAL

## 2023-05-30 VITALS
BODY MASS INDEX: 28.36 KG/M2 | TEMPERATURE: 97.8 F | RESPIRATION RATE: 20 BRPM | OXYGEN SATURATION: 100 % | SYSTOLIC BLOOD PRESSURE: 114 MMHG | DIASTOLIC BLOOD PRESSURE: 74 MMHG | WEIGHT: 150.1 LBS | HEART RATE: 53 BPM

## 2023-05-30 DIAGNOSIS — H92.02 EAR DISCOMFORT, LEFT: Primary | ICD-10-CM

## 2023-05-30 PROCEDURE — 99213 OFFICE O/P EST LOW 20 MIN: CPT | Performed by: PHYSICIAN ASSISTANT

## 2023-05-30 ASSESSMENT — PAIN SCALES - GENERAL: PAINLEVEL: NO PAIN (0)

## 2023-05-31 NOTE — PROGRESS NOTES
SUBJECTIVE:    Delio Agrawal  presents to  today for evaluation of waxing and waning left ear pain x1 week duration.      No additional acute illness symptoms.       Past Medical History:   Diagnosis Date     Goiter      H. pylori infection 06/23/2021     Postoperative hypothyroidism 12/01/2017     Postsurgical hypoparathyroidism (H) 12/01/2017     Subclinical hyperthyroidism      Current Outpatient Medications   Medication     Calcium Carbonate-Vit D-Min (CALCIUM 1200 PO)     Cyanocobalamin (B-12) 1000 MCG TBCR     levothyroxine (SYNTHROID/LEVOTHROID) 112 MCG tablet     phenazopyridine (PYRIDIUM) 100 MG tablet     vitamin D3 (CHOLECALCIFEROL) 1000 units (25 mcg) tablet     No current facility-administered medications for this visit.     Allergies   Allergen Reactions     Flexeril [Cyclobenzaprine] Shortness Of Breath     Percocet [Oxycodone-Acetaminophen] Difficulty breathing         OBJECTIVE:  /74 (BP Location: Right arm, Patient Position: Sitting, Cuff Size: Adult Regular)   Pulse 53   Temp 97.8  F (36.6  C) (Tympanic)   Resp 20   Wt 68.1 kg (150 lb 1.6 oz)   LMP 09/23/2019   SpO2 100%   BMI 28.36 kg/m          General appearance: alert and no apparent distress  Skin color is uniform color and without rash.  HEENT:   Conjunctiva not injected.  Sclera clear.  Left external canal is clear.  Left TM is intact and normal in color.  Scant amount of clear fluid  Right TM is  intact and normal in color.   Nasal mucosa is congested  Oropharyngeal exam is normal other than evidence of post-nasal drip: no lesions, erythema, adenopathy or exudate.  Neck is supple with no adenopathy  CARDIAC:NORMAL - regular rate and rhythm without murmur.  RESP: Normal - CTA without rales, rhonchi, or wheezing.  NEURO: Alert and oriented.  Normal speech and mentation.  CN II/XII grossly intact.  Gait within normal limits.        ASSESSMENT/PLAN:    (H92.02) Ear discomfort, left  (primary encounter diagnosis)  MDM:  Left ear discomfort.  No evidence of left-sided otitis media or left-sided otitis externa.  Discomfort likely related to mild eustachian tube congestion.  Plan:     -Home observant management.  Follow-up with primary care provider if not resolving next week, or sooner if any worsening

## 2023-05-31 NOTE — NURSING NOTE
Attempted Left ear irrigation using plain warm water, unable to remove, callus like, some debris. Patient tolerated well.  MD recheck ear prior to discharged    Maggy Handley CMA

## 2023-07-20 DIAGNOSIS — E89.2 POST-SURGICAL HYPOPARATHYROIDISM (H): ICD-10-CM

## 2023-07-20 DIAGNOSIS — E89.0 POSTSURGICAL HYPOTHYROIDISM: ICD-10-CM

## 2023-07-20 DIAGNOSIS — E89.0 POSTSURGICAL HYPOTHYROIDISM: Primary | ICD-10-CM

## 2023-07-20 RX ORDER — LEVOTHYROXINE SODIUM 112 UG/1
TABLET ORAL
Qty: 90 TABLET | Refills: 4 | Status: SHIPPED | OUTPATIENT
Start: 2023-07-20 | End: 2024-08-15

## 2023-08-24 ENCOUNTER — LAB (OUTPATIENT)
Dept: LAB | Facility: CLINIC | Age: 53
End: 2023-08-24
Payer: COMMERCIAL

## 2023-08-24 DIAGNOSIS — E89.0 POSTSURGICAL HYPOTHYROIDISM: ICD-10-CM

## 2023-08-24 DIAGNOSIS — E89.2 POST-SURGICAL HYPOPARATHYROIDISM (H): ICD-10-CM

## 2023-08-24 LAB
CALCIUM SERPL-MCNC: 8.2 MG/DL (ref 8.6–10)
PTH-INTACT SERPL-MCNC: 12 PG/ML (ref 15–65)
TSH SERPL DL<=0.005 MIU/L-ACNC: 3.41 UIU/ML (ref 0.3–4.2)

## 2023-08-24 PROCEDURE — 84443 ASSAY THYROID STIM HORMONE: CPT | Performed by: PATHOLOGY

## 2023-08-24 PROCEDURE — 82310 ASSAY OF CALCIUM: CPT | Performed by: PATHOLOGY

## 2023-08-24 PROCEDURE — 36415 COLL VENOUS BLD VENIPUNCTURE: CPT | Performed by: PATHOLOGY

## 2023-08-24 PROCEDURE — 83970 ASSAY OF PARATHORMONE: CPT | Performed by: PATHOLOGY

## 2023-09-04 NOTE — PROGRESS NOTES
Endocrinology Clinic     Delio Agrawal MRN:[id number] YOB: 1970  Primary care provider: Cassandra Apodaca     Medical Decision Making:   # Post surgical hypoparathyroidism  # Hypocalcemia  -review based on the dispensation record, the pt was prescribed calcitriol 0.75 mcg from 1085-2263, then 0.5 mcg till 2021. The patient stopped taking calcitriol since around 2020 due to new onset flushing symptom coincidentally with her menopause. She attributed the calcitriol makes the flushing (feeling warmth in her face, without redness) worse   -calcium trend shows low levels with undetectable PTH since 2017 post thyroid removal  -urine calcium in 2019 0.14 g/24h  -current symptoms: fatigue, mild forgetfulness, paresthesia on her knees, mild muscle twitching    Current calcium, vit D intake through OTC supplements: Calcium 2080 mg / day, Vitamin D 1280 international unit(s) / day    PLAN:  -start calcitriol 0.25 mcg daily and continue current calcium intake. Repeat calcium/hypoparathyroidism labs in 1 week    #Post surgical hypothyroidism - at goal  -TSH 3.41 in 8/2023  -on LT4 112 mcg on stable dose since 2018  -She also endorsed throat discomfort since the surgery. Recommend following up with her ENT.    Return in about 6 months (around 3/7/2024).    History of Present Illness:   Delio Agrawal a 53 year old is here for follow up of postoperative hypothyroidism and hypocalcemia.    Interval Events:  Off of calcitriol since 2 years  Dx with SIBO April 2022 due to taking too much calcium carbonate.    She also complained of the surgical stitch after the thyroidectomy, causing pain during swallowing    Hypocalcemia symptoms  Paresthesias in her knees  Muscle twitching +ve  Seizures -ve  +ve Fatigue  Bronchospasm -ve  Hypotension -ve  Menopause 3 years ago  Extrapyramidal signs -ve  Mildly forgetful  Abnormal dentition  Dry skin -ve  Denied constipation    Thyroid symptoms    Energy level: low  Eye  symptoms: -ve  Heart racing: -ve  Tremor: -ve  Weight loss/gain: stable  Neck pain: +ve as above  GI:  no NVD  Period: menopause in 2020    Labs in 8/2023  Ca 8.2, TSH 3.41, no albumin, PTH 12 5/2023   Phos 5.1, Ca 7.4    Relevant meds:  -Supplements: Calcium 2080 mg / day, Vitamin D 1280 international unit(s) / day  -levothyroxine 112 mcg daily      History of problem:  The pt was seen last in our endocrine clinic 11/2021. As per previous note:    She was initially seen in 2016 for hyperthyroidism, subclinical, 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 The right lobe also has activity so total thyroidectomy was recommended.   On 12/1/17 she was treated with total thyroidectomy removing 139 gram multinodular goiter.  Pathology showed multinodular goiter and there was a small fragment of parathyroid tissue on the left lobectomy specimen.  Postoperative PTH was < 3 on 12/2, 12/1, 1/5/18 , < 7 on 3/6/18, 7 on 10/15/18 with calcium 7.4. . She has continued to have post surgical hypoparathyroidism.       She is from [place]. There is a family history of Thyroid surgery in her sister and aunt. There is no known family history of cancer.     ROS:  All 12 systems were reviewed and negative except as mentioned in HPI    Past Medical/Surgical History:  Past Medical History:   Diagnosis Date    Goiter     H. pylori infection 06/23/2021    Postoperative hypothyroidism 12/01/2017    Postsurgical hypoparathyroidism (H) 12/01/2017    Subclinical hyperthyroidism      Past Surgical History:   Procedure Laterality Date    THYROIDECTOMY N/A 12/01/2017    Procedure: THYROIDECTOMY;  Total Thyroidectomy of substernal goiter;  Surgeon: Keyla Griffin MD;  Location: [facility]    US BREAST BIOPSY RT  2017       Allergies:  Allergies   Allergen Reactions    Flexeril [Cyclobenzaprine] Shortness Of Breath    Percocet [Oxycodone-Acetaminophen] Difficulty breathing       Current meds:   Current Outpatient Medications   Medication     calcitRIOL (ROCALTROL) 0.25 MCG capsule    Calcium Carbonate-Vit D-Min (CALCIUM 1200 PO)    Cyanocobalamin (B-12) 1000 MCG TBCR    levothyroxine (SYNTHROID/LEVOTHROID) 112 MCG tablet    vitamin D3 (CHOLECALCIFEROL) 1000 units (25 mcg) tablet    phenazopyridine (PYRIDIUM) 100 MG tablet     No current facility-administered medications for this visit.       Family History:  Family History   Problem Relation Age of Onset    Thyroid Disease Sister         surgery x 2    Thyroid Disease Maternal Aunt         surgery complicated by loss of voice.   days later    Thyroid Disease Other     Thyroid Disease Sister        Social History:  Social History     Tobacco Use    Smoking status: Never    Smokeless tobacco: Never   Substance Use Topics    Alcohol use: No           Pertinent Physical Exam:   /65   Pulse 62   Wt 69.8 kg (153 lb 12.8 oz)   LMP 2019   SpO2 100%   BMI 29.06 kg/m    General: pleasant, NAD  HEENT: normocephalic, atraumatic. Oral mucous membranes moist. Thyroid nonpalpable  CV: RRR, HR 62  Lungs: unlabored respiration, no cough  ABD: rounded, nondistended  Skin: warm and dry, no obvious lesions  MSK:  moves all extremities, normal reflex, -ve chvostek sign  Lymp:  no LE edema   Mental status:  alert, oriented to self, place, time  Neuro: cranial nerve grossly intact  Psych:  calm and appropriate interaction       Pertinent Labs and Imaging:       Latest Reference Range & Units 22 15:20 23 19:45 23 15:57   Calcium 8.6 - 10.0 mg/dL 7.9 (L) 7.4 (L)  7.4 (L) 8.2 (L)   TSH 0.30 - 4.20 uIU/mL   3.41   Parathyroid Hormone Intact 15 - 65 pg/mL   12 (L)   (L): Data is abnormally low      ENDO THYROID LABS-[facility] Latest Ref Rng 3/30/2016 3/17/2016 8/3/2015   TSH 0.40 - 4.00 mU/L <0.01 (L) <0.01 (L) <0.01 (L)   T4 FREE 0.76 - 1.46 ng/dL 1.42 1.30 1.92 (H)   TRIIODOTHYRONINE(T3) 60 - 181 ng/dL 127 99     THYR PEROXIDASE DARNELL <35 IU/mL <10       THYROID STIM IMMUNOG   <1.0 . . .           ENDO THYROID LABS-[facility] Latest Ref Rng 4/3/2015   TSH 0.40 - 4.00 mU/L 0.03 (L)   T4 FREE 0.76 - 1.46 ng/dL 1.29   TRIIODOTHYRONINE(T3) 60 - 181 ng/dL     THYR PEROXIDASE DARNELL <35 IU/mL     THYROID STIM IMMUNOG          Exam: Thyroid Ultrasound 4/1/2016 3:22 PM     Comparison: None     History: Nontoxic goiter, unspecified     Technique: Grayscale and color ultrasound imaging of the thyroid was  performed.     Findings:     Multiple nodules are noted within the thyroid, variable in size from  subcentimeter to multiple centimeters. Parenchyma is otherwise for the  most part homogeneous, but there is some increased flow on color  Doppler. Aside from the described thyroid nodules below, several other  subcentimeter nodules were appreciated.     The right lobe of the thyroid measures: 5.2 x 1.7 x 1.7 cm       The thyroid isthmus measures: 0.2     The left lobe of the thyroid measures: 6.3 x 3.6 x 2.7 cm       Right lobe:  Nodule 1:  Nodule measurement: 1.2 x 0.8 x 1 cm    Echogenicity: Mixed  Consistency: Both cystic and solid  Calcifications: no  Hypervascular: yes        Nodule 2:  Nodule measurement: 1.4 x 0.6 x 1.4 cm  Echogenicity: Heterogeneous  Consistency: Predominately solid  Calcifications: None  Hypervascular: yes        Left Lobe:    Nodule 1:  Nodule measurement: 6 x 4.7 x 4.4 cm    Echogenicity: Isoechoic  Consistency: Predominately solid  Calcifications: no  Hypervascular: yes     Nodule 2:  Nodule measurement: 2 x 1 x 0.9 cm    Echogenicity: Hyperechoic, but heterogeneous  Consistency: Predominately solid  Calcifications: no  Hypervascular: yes            Impression:  1. Multinodular goiter with the largest nodules as detailed above.  Based off of size and some of the sonographic characteristics, biopsy  could be considered.  2. Mild thyroid hyperemia which can be seen with thyroiditis.  Correlate with clinical symptoms.     VENICE VASQUEZ MD     Examination:  NM THYROID UPTAKE AND SCAN  Examination:  Nuclear medicine thyroid uptake and scan, on 4/19/2016  3:29 PM.     Indication:  Subclinical Hyperthyroidism, Nontoxic goiter, unspecified     Additional Information: Diffusely enlarged goiter. No clinical  features of hyperthyroidism.     Comparison:  Ultrasound dated 4/1/2016     Technique:     The patient received 210 uci of I-123 orally.  Uptake measurements and  scan was obtained at 24 hours.     Findings:     The uptake at 24 hours was measured at 30%.  The normal range at 24  hours is from 10 to 30%.  Uptake on right: 5%,  Uptake on Left: 25%.     Total computer estimated weight of the gland: 95 gm,  Right gland  weight: 20 gm,  Left gland weight: 75 gm.     Images of the gland demonstrates asymmetric enlargement of the left  lobe of the thyroid relative to the right which corresponds to the  findings on the ultrasound correlate. There is uniform uptake  throughout the thyroid gland in general without cold defects.  No  autonomous nodules were identified.           Impression:     1.  30% uptake at 24 hours is upper limit of normal with homogeneous  radiotracer uptake throughout.  2.  Asymmetric enlargement of the left lobe of the thyroid relative to  the right without evidence of autonomous or cold thyroid nodules.           I have personally reviewed the examination and initial interpretation  and I agree with the findings.     FAREED LOMBARDI MD    Discussed with Attending  Emmett Mckoy MD  Endocrine Fellow  Pager # [phone]

## 2023-09-07 ENCOUNTER — OFFICE VISIT (OUTPATIENT)
Dept: ENDOCRINOLOGY | Facility: CLINIC | Age: 53
End: 2023-09-07
Payer: COMMERCIAL

## 2023-09-07 VITALS
DIASTOLIC BLOOD PRESSURE: 65 MMHG | BODY MASS INDEX: 29.06 KG/M2 | WEIGHT: 153.8 LBS | OXYGEN SATURATION: 100 % | HEART RATE: 62 BPM | SYSTOLIC BLOOD PRESSURE: 102 MMHG

## 2023-09-07 DIAGNOSIS — E89.0 POSTSURGICAL HYPOTHYROIDISM: ICD-10-CM

## 2023-09-07 DIAGNOSIS — E89.2 POST-SURGICAL HYPOPARATHYROIDISM (H): Primary | ICD-10-CM

## 2023-09-07 PROCEDURE — 99214 OFFICE O/P EST MOD 30 MIN: CPT | Mod: GC | Performed by: INTERNAL MEDICINE

## 2023-09-07 RX ORDER — CALCITRIOL 0.25 UG/1
0.25 CAPSULE, LIQUID FILLED ORAL DAILY
Qty: 30 CAPSULE | Refills: 3 | Status: SHIPPED | OUTPATIENT
Start: 2023-09-07 | End: 2023-09-07

## 2023-09-07 RX ORDER — CALCITRIOL 0.25 UG/1
0.25 CAPSULE, LIQUID FILLED ORAL DAILY
Qty: 90 CAPSULE | Refills: 3 | Status: SHIPPED | OUTPATIENT
Start: 2023-09-07 | End: 2024-08-14

## 2023-09-07 ASSESSMENT — PAIN SCALES - GENERAL: PAINLEVEL: NO PAIN (0)

## 2023-09-07 NOTE — PATIENT INSTRUCTIONS
Adverse Reactions from calcitriol  The following adverse drug reactions and incidences are derived from product labeling unless otherwise specified.  >10%: Endocrine & metabolic: Hypercalcemia    1% to 10%:    Central nervous system: Headache    Dermatologic: Skin rash    Endocrine & metabolic: Polydipsia    Gastrointestinal: Abdominal pain, nausea    Genitourinary: Urinary tract infection    Frequency not defined:    Cardiovascular: Cardiac arrhythmia, hypertension    Central nervous system: Apathy, drowsiness, hyperthermia, metallic taste, psychosis, sensory disturbance    Dermatologic: Erythema, erythema multiforme, pruritus, urticaria    Endocrine & metabolic: Albuminuria, calcinosis, decreased libido, dehydration, growth suppression, hypercholesterolemia, weight loss    Gastrointestinal: Anorexia, constipation, pancreatitis, stomach pain, vomiting, xerostomia    Genitourinary: Hypercalciuria, nocturia    Hepatic: Increased serum ALT, increased serum AST    Hypersensitivity: Hypersensitivity reaction    Local: Pain at injection site (mild)    Neuromuscular & skeletal: Dystrophy, myalgia, ostealgia, weakness    Ophthalmic: Conjunctivitis, photophobia    Renal: Calcium nephrolithiasis, increased blood urea nitrogen, increased serum creatinine, polyuria    Respiratory: Rhinorrhea    <1%, postmarketing and/or case reports: Agitation, anaphylaxis, apprehension, hypermagnesemia, hyperphosphatemia, hypervitaminosis D, increased hematocrit, increased hemoglobin, increased neutrophils, increased serum alkaline phosphatase, insomnia, limb pain, lymphocytosis        Please check for alfacalcidol or dihydrotachysterol.      Dietary sources of calcium: These also contain vitamin D  Milk / buttermilk              8 oz            300 mg  Dry milk powder       5 Tbsp             300 mg  Yogurt                          1 cup           400 mg  Ice cream   1/2 cup          100 mg  Hard cheese                     1.5 oz           300 mg  Cottage cheese                1/2 cup        65 mg  Spinach, cooked  1 cup  240 mg  Tofu, soft regular           4 oz          120 to 390 mg  Sardines                       3 oz               370 mg  Mackerel canned         3 oz                250 mg  Canned salmon with bones 3 oz        170-210 mg  Calcium fortified cereals are available  SILK soy and almond milk products are calcium fortified  Orange juice  Fortified with Calcium       8 oz            300 mg  Low fat dairy sources are recommended

## 2023-09-07 NOTE — NURSING NOTE
Chief Complaint   Patient presents with    Follow Up     Endocrine            Simone English CMA

## 2023-09-07 NOTE — LETTER
9/7/2023       RE: Delio Agrawal  [address]  [facility] [address]     Dear Colleague,    Thank you for referring your patient, Delio Agrawal, to the [facility] ENDOCRINOLOG[facility] [place] at [facility]. Please see a copy of my visit note below.      Endocrinology Clinic     Delio Agrawal MRN:[id number] YOB: 1970  Primary care provider: Cassandra Apodaca     Medical Decision Making:   # Post surgical hypoparathyroidism  # Hypocalcemia  -review based on the dispensation record, the pt was prescribed calcitriol 0.75 mcg from 8273-1017, then 0.5 mcg till 2021. The patient stopped taking calcitriol since around 2020 due to new onset flushing symptom coincidentally with her menopause. She attributed the calcitriol makes the flushing (feeling warmth in her face, without redness) worse   -calcium trend shows low levels with undetectable PTH since 2017 post thyroid removal  -urine calcium in 2019 0.14 g/24h  -current symptoms: fatigue, mild forgetfulness, paresthesia on her knees, mild muscle twitching    Current calcium, vit D intake through OTC supplements: Calcium 2080 mg / day, Vitamin D 1280 international unit(s) / day    PLAN:  -start calcitriol 0.25 mcg daily and continue current calcium intake. Repeat calcium/hypoparathyroidism labs in 1 week    #Post surgical hypothyroidism - at goal  -TSH 3.41 in 8/2023  -on LT4 112 mcg on stable dose since 2018  -She also endorsed throat discomfort since the surgery. Recommend following up with her ENT.    Return in about 6 months (around 3/7/2024).    History of Present Illness:   Delio Agrawal a 53 year old is here for follow up of postoperative hypothyroidism and hypocalcemia.    Interval Events:  Off of calcitriol since 2 years  Dx with ARRONO April 2022 due to taking too much calcium carbonate.    She also complained of the surgical stitch after the thyroidectomy, causing pain during  swallowing    Hypocalcemia symptoms  Paresthesias in her knees  Muscle twitching +ve  Seizures -ve  +ve Fatigue  Bronchospasm -ve  Hypotension -ve  Menopause 3 years ago  Extrapyramidal signs -ve  Mildly forgetful  Abnormal dentition  Dry skin -ve  Denied constipation    Thyroid symptoms    Energy level: low  Eye symptoms: -ve  Heart racing: -ve  Tremor: -ve  Weight loss/gain: stable  Neck pain: +ve as above  GI:  no NVD  Period: menopause in 2020    Labs in 8/2023  Ca 8.2, TSH 3.41, no albumin, PTH 12    5/2023   Phos 5.1, Ca 7.4    Relevant meds:  -Supplements: Calcium 2080 mg / day, Vitamin D 1280 international unit(s) / day  -levothyroxine 112 mcg daily      History of problem:  The pt was seen last in our endocrine clinic 11/2021. As per previous note:    She was initially seen in 2016 for hyperthyroidism, subclinical, 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 The right lobe also has activity so total thyroidectomy was recommended.   On 12/1/17 she was treated with total thyroidectomy removing 139 gram multinodular goiter.  Pathology showed multinodular goiter and there was a small fragment of parathyroid tissue on the left lobectomy specimen.  Postoperative PTH was < 3 on 12/2, 12/1, 1/5/18 , < 7 on 3/6/18, 7 on 10/15/18 with calcium 7.4. . She has continued to have post surgical hypoparathyroidism.       She is from [place]. There is a family history of Thyroid surgery in her sister and aunt. There is no known family history of cancer.     ROS:  All 12 systems were reviewed and negative except as mentioned in HPI    Past Medical/Surgical History:  Past Medical History:   Diagnosis Date    Goiter     H. pylori infection 06/23/2021    Postoperative hypothyroidism 12/01/2017    Postsurgical hypoparathyroidism (H) 12/01/2017    Subclinical hyperthyroidism      Past Surgical History:   Procedure Laterality Date    THYROIDECTOMY N/A 12/01/2017    Procedure: THYROIDECTOMY;  Total Thyroidectomy of substernal  goiter;  Surgeon: Keyla Griffin MD;  Location: [facility]    US BREAST BIOPSY RT  2017       Allergies:  Allergies   Allergen Reactions    Flexeril [Cyclobenzaprine] Shortness Of Breath    Percocet [Oxycodone-Acetaminophen] Difficulty breathing       Current meds:   Current Outpatient Medications   Medication    calcitRIOL (ROCALTROL) 0.25 MCG capsule    Calcium Carbonate-Vit D-Min (CALCIUM 1200 PO)    Cyanocobalamin (B-12) 1000 MCG TBCR    levothyroxine (SYNTHROID/LEVOTHROID) 112 MCG tablet    vitamin D3 (CHOLECALCIFEROL) 1000 units (25 mcg) tablet    phenazopyridine (PYRIDIUM) 100 MG tablet     No current facility-administered medications for this visit.       Family History:  Family History   Problem Relation Age of Onset    Thyroid Disease Sister         surgery x 2    Thyroid Disease Maternal Aunt         surgery complicated by loss of voice.   days later    Thyroid Disease Other     Thyroid Disease Sister        Social History:  Social History     Tobacco Use    Smoking status: Never    Smokeless tobacco: Never   Substance Use Topics    Alcohol use: No           Pertinent Physical Exam:   /65   Pulse 62   Wt 69.8 kg (153 lb 12.8 oz)   LMP 2019   SpO2 100%   BMI 29.06 kg/m    General: pleasant, NAD  HEENT: normocephalic, atraumatic. Oral mucous membranes moist. Thyroid nonpalpable  CV: RRR, HR 62  Lungs: unlabored respiration, no cough  ABD: rounded, nondistended  Skin: warm and dry, no obvious lesions  MSK:  moves all extremities, normal reflex, -ve chvostek sign  Lymp:  no LE edema   Mental status:  alert, oriented to self, place, time  Neuro: cranial nerve grossly intact  Psych:  calm and appropriate interaction       Pertinent Labs and Imaging:       Latest Reference Range & Units 22 15:20 23 19:45 23 15:57   Calcium 8.6 - 10.0 mg/dL 7.9 (L) 7.4 (L)  7.4 (L) 8.2 (L)   TSH 0.30 - 4.20 uIU/mL   3.41   Parathyroid Hormone Intact 15 - 65 pg/mL   12 (L)   (L): Data is  abnormally low      ENDO THYROID LABS-[facility] Latest Ref Rng 3/30/2016 3/17/2016 8/3/2015   TSH 0.40 - 4.00 mU/L <0.01 (L) <0.01 (L) <0.01 (L)   T4 FREE 0.76 - 1.46 ng/dL 1.42 1.30 1.92 (H)   TRIIODOTHYRONINE(T3) 60 - 181 ng/dL 127 99     THYR PEROXIDASE DARNELL <35 IU/mL <10       THYROID STIM IMMUNOG   <1.0 . . .          ENDO THYROID LABS-[facility] Latest Ref Rng 4/3/2015   TSH 0.40 - 4.00 mU/L 0.03 (L)   T4 FREE 0.76 - 1.46 ng/dL 1.29   TRIIODOTHYRONINE(T3) 60 - 181 ng/dL     THYR PEROXIDASE DARNELL <35 IU/mL     THYROID STIM IMMUNOG          Exam: Thyroid Ultrasound 4/1/2016 3:22 PM     Comparison: None     History: Nontoxic goiter, unspecified     Technique: Grayscale and color ultrasound imaging of the thyroid was  performed.     Findings:     Multiple nodules are noted within the thyroid, variable in size from  subcentimeter to multiple centimeters. Parenchyma is otherwise for the  most part homogeneous, but there is some increased flow on color  Doppler. Aside from the described thyroid nodules below, several other  subcentimeter nodules were appreciated.     The right lobe of the thyroid measures: 5.2 x 1.7 x 1.7 cm       The thyroid isthmus measures: 0.2     The left lobe of the thyroid measures: 6.3 x 3.6 x 2.7 cm       Right lobe:  Nodule 1:  Nodule measurement: 1.2 x 0.8 x 1 cm    Echogenicity: Mixed  Consistency: Both cystic and solid  Calcifications: no  Hypervascular: yes        Nodule 2:  Nodule measurement: 1.4 x 0.6 x 1.4 cm  Echogenicity: Heterogeneous  Consistency: Predominately solid  Calcifications: None  Hypervascular: yes        Left Lobe:    Nodule 1:  Nodule measurement: 6 x 4.7 x 4.4 cm    Echogenicity: Isoechoic  Consistency: Predominately solid  Calcifications: no  Hypervascular: yes     Nodule 2:  Nodule measurement: 2 x 1 x 0.9 cm    Echogenicity: Hyperechoic, but heterogeneous  Consistency: Predominately solid  Calcifications: no  Hypervascular: yes            Impression:  1. Multinodular goiter with  the largest nodules as detailed above.  Based off of size and some of the sonographic characteristics, biopsy  could be considered.  2. Mild thyroid hyperemia which can be seen with thyroiditis.  Correlate with clinical symptoms.     VENICE VASQUEZ MD     Examination:  NM THYROID UPTAKE AND SCAN  Examination: Nuclear medicine thyroid uptake and scan, on 4/19/2016  3:29 PM.     Indication:  Subclinical Hyperthyroidism, Nontoxic goiter, unspecified     Additional Information: Diffusely enlarged goiter. No clinical  features of hyperthyroidism.     Comparison:  Ultrasound dated 4/1/2016     Technique:     The patient received 210 uci of I-123 orally.  Uptake measurements and  scan was obtained at 24 hours.     Findings:     The uptake at 24 hours was measured at 30%.  The normal range at 24  hours is from 10 to 30%.  Uptake on right: 5%,  Uptake on Left: 25%.     Total computer estimated weight of the gland: 95 gm,  Right gland  weight: 20 gm,  Left gland weight: 75 gm.     Images of the gland demonstrates asymmetric enlargement of the left  lobe of the thyroid relative to the right which corresponds to the  findings on the ultrasound correlate. There is uniform uptake  throughout the thyroid gland in general without cold defects.  No  autonomous nodules were identified.           Impression:     1.  30% uptake at 24 hours is upper limit of normal with homogeneous  radiotracer uptake throughout.  2.  Asymmetric enlargement of the left lobe of the thyroid relative to  the right without evidence of autonomous or cold thyroid nodules.           I have personally reviewed the examination and initial interpretation  and I agree with the findings.     FAREED LOMBARDI MD    Discussed with Attending  Venice Mckoy MD  Endocrine Fellow  Pager # [phone]      Attestation signed by Andrew Olivo MD at 9/14/2023  5:08 PM:  Tie in statement    Ms Agrawal is 53 years female with hypothyroidism and hypoparathyroidism  secondary to total thyroidectomy for multinodular goiter in 2017.     Patient was initially on calcitriol and calcium carbonate but developed small bowel bacterial overgrowth in 4/2022. She also stopped calcitriol due to reported flushing. Over the past year, she has been on only calcium carbonate.  Calcium 2080 mg per day, vitamin D 1280 international unit(s) per day. Lab still showed low calcium. Vitamin D is in high end of normal.     Agree to restart calcitriol 0.25 mcg daily. Hope to be able to reduce calcium carboate  Repeat lab in 1-2 weeks after restarting calcitriol  Continue current dose of thyroid hormone    I was present with the fellow who participated in the service and in the documentation of the note. I have verified the history and personally performed the physical exam and medical decision making. I agree with the assessment and plan of care as documented in the note.     External notes/medical records independently reviewed, labs and imaging independently reviewed, medical management and tests to be discussed/communicated to patient.    Time: I spent 32 minutes spent on the date of the encounter preparing to see patient (including chart review and preparation), obtaining and or reviewing additional medical history, performing a physical exam and evaluation, documenting clinical information in the electronic health record, independently interpreting results, communicating results to the patient and coordinating care.    Andrew Olivo MD  Division of Diabetes and Endocrinology  Department of Medicine

## 2023-09-15 ENCOUNTER — LAB (OUTPATIENT)
Dept: LAB | Facility: CLINIC | Age: 53
End: 2023-09-15
Payer: COMMERCIAL

## 2023-09-15 DIAGNOSIS — E89.2 POST-SURGICAL HYPOPARATHYROIDISM (H): ICD-10-CM

## 2023-09-15 LAB
ALBUMIN SERPL BCG-MCNC: 4.7 G/DL (ref 3.5–5.2)
CALCIUM SERPL-MCNC: 8 MG/DL (ref 8.6–10)
MAGNESIUM SERPL-MCNC: 2.3 MG/DL (ref 1.7–2.3)
PHOSPHATE SERPL-MCNC: 5.5 MG/DL (ref 2.5–4.5)

## 2023-09-15 PROCEDURE — 36415 COLL VENOUS BLD VENIPUNCTURE: CPT | Performed by: PATHOLOGY

## 2023-09-15 PROCEDURE — 83735 ASSAY OF MAGNESIUM: CPT | Performed by: PATHOLOGY

## 2023-09-15 PROCEDURE — 99000 SPECIMEN HANDLING OFFICE-LAB: CPT | Performed by: PATHOLOGY

## 2023-09-15 PROCEDURE — 82306 VITAMIN D 25 HYDROXY: CPT | Performed by: INTERNAL MEDICINE

## 2023-09-15 PROCEDURE — 82310 ASSAY OF CALCIUM: CPT | Performed by: PATHOLOGY

## 2023-09-15 PROCEDURE — 84100 ASSAY OF PHOSPHORUS: CPT | Performed by: PATHOLOGY

## 2023-09-15 PROCEDURE — 82040 ASSAY OF SERUM ALBUMIN: CPT | Performed by: PATHOLOGY

## 2023-09-15 NOTE — RESULT ENCOUNTER NOTE
Your calcium level has come back and is still low. If you are able to tolerate calcitriol well, I would recommend increasing the dose to 0.5 mcg daily (you are currently on 0.25 mg daily). If you need more prescription, please let me know

## 2023-09-20 LAB — DEPRECATED CALCIDIOL+CALCIFEROL SERPL-MC: 50 UG/L (ref 20–75)

## 2023-11-13 ENCOUNTER — PATIENT OUTREACH (OUTPATIENT)
Dept: CARE COORDINATION | Facility: CLINIC | Age: 53
End: 2023-11-13
Payer: COMMERCIAL

## 2023-12-11 ENCOUNTER — PATIENT OUTREACH (OUTPATIENT)
Dept: CARE COORDINATION | Facility: CLINIC | Age: 53
End: 2023-12-11
Payer: COMMERCIAL

## 2024-03-27 NOTE — PROGRESS NOTES
03/27/24 10:19 AM  PATIENT LAB/IMAGING STATUS : No pending lab orders   Jamia Monroe CMA  Appointment reminder phone call made to patient.

## 2024-04-25 ENCOUNTER — LAB (OUTPATIENT)
Dept: LAB | Facility: CLINIC | Age: 54
End: 2024-04-25
Payer: COMMERCIAL

## 2024-04-25 ENCOUNTER — OFFICE VISIT (OUTPATIENT)
Dept: ENDOCRINOLOGY | Facility: CLINIC | Age: 54
End: 2024-04-25
Payer: COMMERCIAL

## 2024-04-25 VITALS
OXYGEN SATURATION: 100 % | SYSTOLIC BLOOD PRESSURE: 101 MMHG | HEIGHT: 61 IN | HEART RATE: 70 BPM | WEIGHT: 155 LBS | DIASTOLIC BLOOD PRESSURE: 68 MMHG | BODY MASS INDEX: 29.27 KG/M2

## 2024-04-25 DIAGNOSIS — E89.0 H/O TOTAL THYROIDECTOMY: ICD-10-CM

## 2024-04-25 DIAGNOSIS — E89.2 POST-SURGICAL HYPOPARATHYROIDISM (H): ICD-10-CM

## 2024-04-25 DIAGNOSIS — E89.0 POSTSURGICAL HYPOTHYROIDISM: ICD-10-CM

## 2024-04-25 DIAGNOSIS — E89.2 POST-SURGICAL HYPOPARATHYROIDISM (H): Primary | ICD-10-CM

## 2024-04-25 LAB
ALBUMIN SERPL BCG-MCNC: 4.5 G/DL (ref 3.5–5.2)
CALCIUM SERPL-MCNC: 7.2 MG/DL (ref 8.6–10)
CREAT SERPL-MCNC: 0.84 MG/DL (ref 0.51–0.95)
EGFRCR SERPLBLD CKD-EPI 2021: 83 ML/MIN/1.73M2
MAGNESIUM SERPL-MCNC: 2.2 MG/DL (ref 1.7–2.3)
PHOSPHATE SERPL-MCNC: 4.5 MG/DL (ref 2.5–4.5)
PTH-INTACT SERPL-MCNC: 9 PG/ML (ref 15–65)
T4 FREE SERPL-MCNC: 1.6 NG/DL (ref 0.9–1.7)
TSH SERPL DL<=0.005 MIU/L-ACNC: 5.24 UIU/ML (ref 0.3–4.2)
VIT D+METAB SERPL-MCNC: 46 NG/ML (ref 20–50)

## 2024-04-25 PROCEDURE — G2211 COMPLEX E/M VISIT ADD ON: HCPCS | Performed by: INTERNAL MEDICINE

## 2024-04-25 PROCEDURE — 99000 SPECIMEN HANDLING OFFICE-LAB: CPT | Performed by: PATHOLOGY

## 2024-04-25 PROCEDURE — 83735 ASSAY OF MAGNESIUM: CPT | Performed by: PATHOLOGY

## 2024-04-25 PROCEDURE — 36415 COLL VENOUS BLD VENIPUNCTURE: CPT | Performed by: PATHOLOGY

## 2024-04-25 PROCEDURE — 84100 ASSAY OF PHOSPHORUS: CPT | Performed by: PATHOLOGY

## 2024-04-25 PROCEDURE — 82306 VITAMIN D 25 HYDROXY: CPT | Performed by: INTERNAL MEDICINE

## 2024-04-25 PROCEDURE — 84443 ASSAY THYROID STIM HORMONE: CPT | Performed by: PATHOLOGY

## 2024-04-25 PROCEDURE — 82565 ASSAY OF CREATININE: CPT | Performed by: PATHOLOGY

## 2024-04-25 PROCEDURE — 83970 ASSAY OF PARATHORMONE: CPT | Performed by: PATHOLOGY

## 2024-04-25 PROCEDURE — 99214 OFFICE O/P EST MOD 30 MIN: CPT | Mod: GC | Performed by: INTERNAL MEDICINE

## 2024-04-25 PROCEDURE — 82040 ASSAY OF SERUM ALBUMIN: CPT | Performed by: PATHOLOGY

## 2024-04-25 PROCEDURE — 84439 ASSAY OF FREE THYROXINE: CPT | Performed by: PATHOLOGY

## 2024-04-25 PROCEDURE — 82310 ASSAY OF CALCIUM: CPT | Performed by: PATHOLOGY

## 2024-04-25 RX ORDER — IBUPROFEN 200 MG
1 CAPSULE ORAL 3 TIMES DAILY
Qty: 90 TABLET | Refills: 4 | Status: SHIPPED | OUTPATIENT
Start: 2024-04-25 | End: 2024-08-14

## 2024-04-25 RX ORDER — CALCITRIOL 0.5 UG/1
0.5 CAPSULE, LIQUID FILLED ORAL DAILY
Qty: 90 CAPSULE | Refills: 3 | Status: SHIPPED | OUTPATIENT
Start: 2024-04-25

## 2024-04-25 ASSESSMENT — PAIN SCALES - GENERAL: PAINLEVEL: NO PAIN (0)

## 2024-04-25 NOTE — PATIENT INSTRUCTIONS
Obtain labs now  If calcium is at goal, we will obtain 24-hour urine calcium.  Please check with eye doctor annually  We will obtain imaging for kidney stones every 3-5 years    24 Hour Urine Collection instruction    Decide on a day that you want to collect the urine for 24 hours.     On the day of collection, discard the first voided urine in the morning (down the toilet)    Start collecting all the urine in the provided container for the entire day and over-night.     The next morning when you wake up collect the first voided urine in the container and then submit the container to the lab.    Store in the refrigerator during the collection

## 2024-04-25 NOTE — LETTER
4/25/2024       RE: Delio Agrawal  [address]  [facility] [address]     Dear Colleague,    Thank you for referring your patient, Delio Agrawal, to the [facility] [place] [facility]. Please see a copy of my visit note below.    03/27/24 10:19 AM  PATIENT LAB/IMAGING STATUS : No pending lab orders   Jamia Monroe CMA  Appointment reminder phone call made to patient.         Endocrinology Clinic     Delio Agrawal MRN:[id number] YOB: 1970  Primary care provider: No primary care provider on file.     Medical Decision Making:   # Post surgical hypoparathyroidism  # Hypocalcemia, symptomatically has improved from last visit  -review based on the dispensation record, the pt was prescribed calcitriol 0.75 mcg from 4347-8225, then 0.5 mcg till 2021. The patient stopped taking calcitriol since around 2020 due to new onset flushing symptom coincidentally with her menopause. She attributed the calcitriol makes the flushing (feeling warmth in her face, without redness) worse   -calcium trend shows low levels with undetectable PTH since 2017 post thyroid removal  -urine calcium in 2019 0.14 g/24h  -current symptoms have all improved: fatigue, mild forgetfulness, paresthesia on her knees, mild muscle twitching     Current calcium intake= diet ~2000 mg calcium  Vit D = only calcitriol 0.5 mcg since 1 month ago     PLAN:  -repeat calcium panel. Need to optimize calcium intake to bring down phosphorus  -goal calcium lower normal level, if at goal need to repeat urine calcium 24h annually  -discussed about risk of cataract and kidney stones. Need kidney imaging every 3-5 years and annual appt with opthalmology     #Post surgical hypothyroidism - at goal  -TSH 3.41 in 8/2023  -on LT4 112 mcg on stable dose since 2018    Addendum:  Calcium 7.2, albumin 4.5, phos 4.5. Will add calcium citrate 950 (200 mg) tid.   TSH 5.24.  Repeat calcium panel and TSH in 2 weeks    Return in about 1 year (around 4/25/2025).    History of Present Illness:   Delio Agrawal a 53 year old is here for follow up of hypoparathyroidism post thyroid surgery.    Interval Events:    Meds:  Calcitriol 0.25 mcg bid  Levothyroxine 112 mcg daily    Hypocalcemia symptoms  Improved paresthesias in her knees - no more  Muscle twitching improved  Seizures -ve  -ve Fatigue  Bronchospasm -ve  Hypotension -ve  Menopause 3 years ago  Extrapyramidal signs -ve  Mildly forgetful - improved  Abnormal dentition  Dry skin -ve  Denied constipation     Thyroid symptoms     Energy level: good  Eye symptoms: -ve  Heart racing: -ve  Tremor: -ve  Weight loss/gain: stable  Neck pain: improved  GI:  no NVD  Period: menopause in 2020     Labs in 8/2023  Ca 8.2, TSH 3.41, no albumin, PTH 12     5/2023   Phos 5.1, Ca 7.4     Relevant meds:  -Supplements: Calcium 2080 mg / day, Vitamin D 1280 international unit(s) / day  -levothyroxine 112 mcg daily       History of problem:  She was initially seen in 2016 for hyperthyroidism, subclinical, 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 The right lobe also has activity so total thyroidectomy was recommended.   On 12/1/17 she was treated with total thyroidectomy removing 139 gram multinodular goiter.  Pathology showed multinodular goiter and there was a small fragment of parathyroid tissue on the left lobectomy specimen.  Postoperative PTH was < 3 on 12/2, 12/1, 1/5/18 , < 7 on 3/6/18, 7 on 10/15/18 with calcium 7.4. . She has continued to have post surgical hypoparathyroidism.        She is from [place]. There is a family history of Thyroid surgery in her sister and aunt. There is no known family history of cancer.       ROS:  All 12 systems were reviewed and negative except as mentioned in HPI    Past Medical/Surgical History:  Past Medical History:   Diagnosis Date    Goiter     H. pylori infection 06/23/2021    Postoperative  "hypothyroidism 2017    Postsurgical hypoparathyroidism (H24) 2017    Subclinical hyperthyroidism      Past Surgical History:   Procedure Laterality Date    THYROIDECTOMY N/A 2017    Procedure: THYROIDECTOMY;  Total Thyroidectomy of substernal goiter;  Surgeon: Keyla Griffin MD;  Location:  OR    US BREAST BIOPSY RT  2017       Allergies:  Allergies   Allergen Reactions    Flexeril [Cyclobenzaprine] Shortness Of Breath    Percocet [Oxycodone-Acetaminophen] Difficulty breathing       Current meds:   Current Outpatient Medications   Medication Sig Dispense Refill    calcitRIOL (ROCALTROL) 0.25 MCG capsule Take 1 capsule (0.25 mcg) by mouth daily 90 capsule 3    calcitRIOL (ROCALTROL) 0.5 MCG capsule Take 1 capsule (0.5 mcg) by mouth daily 90 capsule 3    calcium citrate (CITRACAL) 950 (200 Ca) MG tablet Take 1 tablet (950 mg) by mouth 3 times daily 90 tablet 4    Cyanocobalamin (B-12) 1000 MCG TBCR Take 1,000 mcg by mouth daily 90 tablet 1    levothyroxine (SYNTHROID/LEVOTHROID) 112 MCG tablet TAKE ONE TABLET BY MOUTH ONCE DAILY 90 tablet 4    vitamin D3 (CHOLECALCIFEROL) 1000 units (25 mcg) tablet Take 5 tablets (5,000 Units) by mouth daily       No current facility-administered medications for this visit.       Family History:  Family History   Problem Relation Age of Onset    Thyroid Disease Sister         surgery x 2    Thyroid Disease Maternal Aunt         surgery complicated by loss of voice.   days later    Thyroid Disease Other     Thyroid Disease Sister        Social History:  Social History     Tobacco Use    Smoking status: Never    Smokeless tobacco: Never   Substance Use Topics    Alcohol use: No           Pertinent Physical Exam:   /68   Pulse 70   Ht 1.54 m (5' 0.63\")   Wt 70.3 kg (155 lb)   LMP 2019   SpO2 100%   BMI 29.65 kg/m        Pertinent Labs and Imaging:       Latest Ref Rng 2022  3:20 PM 2023  7:45 PM 2023  3:57 PM 9/15/2023  3:38 " PM   ENDO THYROID LABS-[facility]        TSH 0.40 - 4.00 mU/L       TSH 0.30 - 4.20 uIU/mL   3.41     Triiodothyronine (T3) 60 - 181 ng/dL       FREE T4 0.76 - 1.46 ng/dL       Thyroid Peroxidase Antibody <35 IU/mL       Thyroid Stim Immunog        ENDO CALCIUM LABS-[facility]        25 OH Vit D total 20 - 75 ug/L       25 OH Vit D2 ug/L       25 OH Vit D3 ug/L       Vitamin D Deficiency screening 20 - 75 ug/L    50    Albumin 3.4 - 5.0 g/dL       Albumin 3.5 - 5.2 g/dL    4.7    Alkaline Phosphatase 40 - 150 U/L       Calcium 8.6 - 10.0 mg/dL 7.9 (L)  7.4 (L)  8.2 (L)  8.0 (L)    Calcium   7.4 (L)      Calcium Ionized 4.4 - 5.2 mg/dL       Calcium Urine g/24 h 0.10 - 0.30 g/24 h       Calcium Urine g/g Cr g/g Cr       Calcium Urine mg/dL mg/dL       Urea Nitrogen 7 - 30 mg/dL       Urea Nitrogen 6.0 - 20.0 mg/dL 17.1  18.9      Creatinine 0.51 - 0.95 mg/dL 0.83  0.93      Lipase 73 - 393 U/L       Magnesium 1.7 - 2.3 mg/dL    2.3    Parathyroid Hormone Intact 15 - 65 pg/mL   12 (L)        Legend:  (L) Low    Discussed with Attending  Emmett Mckoy MD  Endocrine Fellow  Pager # [phone]      Attestation signed by Raf Villa MD at 4/27/2024  8:48 PM:  I, Raf Villa, saw this patient with the resident/fellow and agree with the resident/fellow's findings and plan of care as documented in the resident/fellow's note.    I personally reviewed vital signs, medications, labs, imaging, and notes.    Key findings:      # Primary hypothyroidism following total thyroidectomy (benign indication) in 2017    # Hypoparathyroidism that developed following total thyroidectomy     Most recently Delio has taken calcitriol 0.25 mcg BID and no calcium supplement (she suspects she ingests about 2000 mg of calcium per day via her diet). She was on vitamin D 10,000 units daily during the winter but stopped this in the spring. She denies hypocalcemic symptoms.     Plan: Repeat labs now., Dr. Mckoy will follow-up the results and  adjust her medications accordingly. Reviewed that once her hypoparathyroidism labs are at goal will obtain a 24 hour urine calcium level.      30 minutes spent on the date of the encounter doing chart review, history and exam, documentation and further activities as noted above.     The longitudinal plan of care for the diagnosis(es)/condition(s) as documented were addressed during this visit. Due to the added complexity in care, I will continue to support Delio in the subsequent management and with ongoing continuity of care.

## 2024-04-25 NOTE — PROGRESS NOTES
Endocrinology Clinic     Delio Agrawal MRN:[id number] YOB: 1970  Primary care provider: No primary care provider on file.     Medical Decision Making:   # Post surgical hypoparathyroidism  # Hypocalcemia, symptomatically has improved from last visit  -review based on the dispensation record, the pt was prescribed calcitriol 0.75 mcg from 1444-6363, then 0.5 mcg till 2021. The patient stopped taking calcitriol since around 2020 due to new onset flushing symptom coincidentally with her menopause. She attributed the calcitriol makes the flushing (feeling warmth in her face, without redness) worse   -calcium trend shows low levels with undetectable PTH since 2017 post thyroid removal  -urine calcium in 2019 0.14 g/24h  -current symptoms have all improved: fatigue, mild forgetfulness, paresthesia on her knees, mild muscle twitching     Current calcium intake= diet ~2000 mg calcium  Vit D = only calcitriol 0.5 mcg since 1 month ago     PLAN:  -repeat calcium panel. Need to optimize calcium intake to bring down phosphorus  -goal calcium lower normal level, if at goal need to repeat urine calcium 24h annually  -discussed about risk of cataract and kidney stones. Need kidney imaging every 3-5 years and annual appt with opthalmology     #Post surgical hypothyroidism - at goal  -TSH 3.41 in 8/2023  -on LT4 112 mcg on stable dose since 2018    Addendum:  Calcium 7.2, albumin 4.5, phos 4.5. Will add calcium citrate 950 (200 mg) tid.   TSH 5.24. Repeat calcium panel and TSH in 2 weeks    Return in about 1 year (around 4/25/2025).    History of Present Illness:   Delio Agrawal a 53 year old is here for follow up of hypoparathyroidism post thyroid surgery.    Interval Events:    Meds:  Calcitriol 0.25 mcg bid  Levothyroxine 112 mcg daily    Hypocalcemia symptoms  Improved paresthesias in her knees - no more  Muscle twitching improved  Seizures -ve  -ve Fatigue  Bronchospasm -ve  Hypotension -ve  Menopause 3  years ago  Extrapyramidal signs -ve  Mildly forgetful - improved  Abnormal dentition  Dry skin -ve  Denied constipation     Thyroid symptoms     Energy level: good  Eye symptoms: -ve  Heart racing: -ve  Tremor: -ve  Weight loss/gain: stable  Neck pain: improved  GI:  no NVD  Period: menopause in 2020     Labs in 8/2023  Ca 8.2, TSH 3.41, no albumin, PTH 12     5/2023   Phos 5.1, Ca 7.4     Relevant meds:  -Supplements: Calcium 2080 mg / day, Vitamin D 1280 international unit(s) / day  -levothyroxine 112 mcg daily       History of problem:  She was initially seen in 2016 for hyperthyroidism, subclinical, 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 The right lobe also has activity so total thyroidectomy was recommended.   On 12/1/17 she was treated with total thyroidectomy removing 139 gram multinodular goiter.  Pathology showed multinodular goiter and there was a small fragment of parathyroid tissue on the left lobectomy specimen.  Postoperative PTH was < 3 on 12/2, 12/1, 1/5/18 , < 7 on 3/6/18, 7 on 10/15/18 with calcium 7.4. . She has continued to have post surgical hypoparathyroidism.        She is from [place]. There is a family history of Thyroid surgery in her sister and aunt. There is no known family history of cancer.       ROS:  All 12 systems were reviewed and negative except as mentioned in HPI    Past Medical/Surgical History:  Past Medical History:   Diagnosis Date    Goiter     H. pylori infection 06/23/2021    Postoperative hypothyroidism 12/01/2017    Postsurgical hypoparathyroidism (H24) 12/01/2017    Subclinical hyperthyroidism      Past Surgical History:   Procedure Laterality Date    THYROIDECTOMY N/A 12/01/2017    Procedure: THYROIDECTOMY;  Total Thyroidectomy of substernal goiter;  Surgeon: Keyla Griffin MD;  Location: [facility] OR    US BREAST BIOPSY RT  2017       Allergies:  Allergies   Allergen Reactions    Flexeril [Cyclobenzaprine] Shortness Of Breath    Percocet  "[Oxycodone-Acetaminophen] Difficulty breathing       Current meds:   Current Outpatient Medications   Medication Sig Dispense Refill    calcitRIOL (ROCALTROL) 0.25 MCG capsule Take 1 capsule (0.25 mcg) by mouth daily 90 capsule 3    calcitRIOL (ROCALTROL) 0.5 MCG capsule Take 1 capsule (0.5 mcg) by mouth daily 90 capsule 3    calcium citrate (CITRACAL) 950 (200 Ca) MG tablet Take 1 tablet (950 mg) by mouth 3 times daily 90 tablet 4    Cyanocobalamin (B-12) 1000 MCG TBCR Take 1,000 mcg by mouth daily 90 tablet 1    levothyroxine (SYNTHROID/LEVOTHROID) 112 MCG tablet TAKE ONE TABLET BY MOUTH ONCE DAILY 90 tablet 4    vitamin D3 (CHOLECALCIFEROL) 1000 units (25 mcg) tablet Take 5 tablets (5,000 Units) by mouth daily       No current facility-administered medications for this visit.       Family History:  Family History   Problem Relation Age of Onset    Thyroid Disease Sister         surgery x 2    Thyroid Disease Maternal Aunt         surgery complicated by loss of voice.   days later    Thyroid Disease Other     Thyroid Disease Sister        Social History:  Social History     Tobacco Use    Smoking status: Never    Smokeless tobacco: Never   Substance Use Topics    Alcohol use: No           Pertinent Physical Exam:   /68   Pulse 70   Ht 1.54 m (5' 0.63\")   Wt 70.3 kg (155 lb)   LMP 2019   SpO2 100%   BMI 29.65 kg/m        Pertinent Labs and Imaging:       Latest Ref Rng 2022  3:20 PM 2023  7:45 PM 2023  3:57 PM 9/15/2023  3:38 PM   ENDO THYROID LABS-[facility]        TSH 0.40 - 4.00 mU/L       TSH 0.30 - 4.20 uIU/mL   3.41     Triiodothyronine (T3) 60 - 181 ng/dL       FREE T4 0.76 - 1.46 ng/dL       Thyroid Peroxidase Antibody <35 IU/mL       Thyroid Stim Immunog        ENDO CALCIUM LABS-[facility]        25 OH Vit D total 20 - 75 ug/L       25 OH Vit D2 ug/L       25 OH Vit D3 ug/L       Vitamin D Deficiency screening 20 - 75 ug/L    50    Albumin 3.4 - 5.0 g/dL       Albumin 3.5 - 5.2 g/dL  "   4.7    Alkaline Phosphatase 40 - 150 U/L       Calcium 8.6 - 10.0 mg/dL 7.9 (L)  7.4 (L)  8.2 (L)  8.0 (L)    Calcium   7.4 (L)      Calcium Ionized 4.4 - 5.2 mg/dL       Calcium Urine g/24 h 0.10 - 0.30 g/24 h       Calcium Urine g/g Cr g/g Cr       Calcium Urine mg/dL mg/dL       Urea Nitrogen 7 - 30 mg/dL       Urea Nitrogen 6.0 - 20.0 mg/dL 17.1  18.9      Creatinine 0.51 - 0.95 mg/dL 0.83  0.93      Lipase 73 - 393 U/L       Magnesium 1.7 - 2.3 mg/dL    2.3    Parathyroid Hormone Intact 15 - 65 pg/mL   12 (L)        Legend:  (L) Low    Discussed with Attending  Emmett Mckoy MD  Endocrine Fellow  Pager # [phone]

## 2024-05-02 ENCOUNTER — TELEPHONE (OUTPATIENT)
Dept: ENDOCRINOLOGY | Facility: CLINIC | Age: 54
End: 2024-05-02
Payer: COMMERCIAL

## 2024-05-02 NOTE — TELEPHONE ENCOUNTER
Patient confirmed scheduled appointment:  Date: 8/14   Time: 1 pm   Visit type: return endocrine   Provider: Arnav   Location: [facility]  Testing/imaging: labs on 5/9 @ 4 pm   Additional notes: Spoke to pt. No avail appts with Ean. Offered cuca Farfan appts were too early, scheduled next avail with any provider that sees for diagnosis JORDAN approved per Dr. Mckoy's request and Zulma RN approval.   From: Emmett Mckoy MD   Sent: 5/1/2024  10:02 PM CDT   To: [facility], could you please help me schedule an appointment for this patient in 2 months. I sent [employer] message to the patient regarding earlier appointment. When you call the patient, please also remind her to take her calcium supplement as I prescribed and recheck calcium level in 2-3 weeks. Thank you     Ann Marie Vásquez, RN  [id number]  Ok to use JORDAN to meet provider request.    Kari Justin on 5/2/2024 at 12:05 PM

## 2024-05-09 ENCOUNTER — LAB (OUTPATIENT)
Dept: LAB | Facility: CLINIC | Age: 54
End: 2024-05-09
Payer: COMMERCIAL

## 2024-05-09 DIAGNOSIS — E89.2 POST-SURGICAL HYPOPARATHYROIDISM (H): ICD-10-CM

## 2024-05-09 LAB
ALBUMIN SERPL BCG-MCNC: 4.3 G/DL (ref 3.5–5.2)
CALCIUM SERPL-MCNC: 7.8 MG/DL (ref 8.6–10)
PHOSPHATE SERPL-MCNC: 4.7 MG/DL (ref 2.5–4.5)

## 2024-05-09 PROCEDURE — 82040 ASSAY OF SERUM ALBUMIN: CPT | Performed by: PATHOLOGY

## 2024-05-09 PROCEDURE — 36415 COLL VENOUS BLD VENIPUNCTURE: CPT | Performed by: PATHOLOGY

## 2024-05-09 PROCEDURE — 82310 ASSAY OF CALCIUM: CPT | Performed by: PATHOLOGY

## 2024-05-09 PROCEDURE — 84100 ASSAY OF PHOSPHORUS: CPT | Performed by: PATHOLOGY

## 2024-05-10 DIAGNOSIS — E83.51 HYPOCALCEMIA: Primary | ICD-10-CM

## 2024-07-07 ENCOUNTER — HEALTH MAINTENANCE LETTER (OUTPATIENT)
Age: 54
End: 2024-07-07

## 2024-07-17 NOTE — PROGRESS NOTES
07/17/24 3:45 PM  PATIENT LAB/IMAGING STATUS : MyChart sent to patient to complete standing/future orders

## 2024-08-14 ENCOUNTER — OFFICE VISIT (OUTPATIENT)
Dept: ENDOCRINOLOGY | Facility: CLINIC | Age: 54
End: 2024-08-14
Payer: COMMERCIAL

## 2024-08-14 ENCOUNTER — LAB (OUTPATIENT)
Dept: LAB | Facility: CLINIC | Age: 54
End: 2024-08-14
Payer: COMMERCIAL

## 2024-08-14 VITALS
DIASTOLIC BLOOD PRESSURE: 79 MMHG | BODY MASS INDEX: 29.45 KG/M2 | SYSTOLIC BLOOD PRESSURE: 127 MMHG | WEIGHT: 154 LBS | HEART RATE: 80 BPM | OXYGEN SATURATION: 100 %

## 2024-08-14 DIAGNOSIS — E83.51 HYPOCALCEMIA: ICD-10-CM

## 2024-08-14 DIAGNOSIS — E89.0 H/O TOTAL THYROIDECTOMY: ICD-10-CM

## 2024-08-14 DIAGNOSIS — E89.2 POST-SURGICAL HYPOPARATHYROIDISM (H): ICD-10-CM

## 2024-08-14 DIAGNOSIS — E89.2 POST-SURGICAL HYPOPARATHYROIDISM (H): Primary | ICD-10-CM

## 2024-08-14 DIAGNOSIS — E89.0 POSTSURGICAL HYPOTHYROIDISM: ICD-10-CM

## 2024-08-14 LAB
ALBUMIN SERPL BCG-MCNC: 4.4 G/DL (ref 3.5–5.2)
CALCIUM SERPL-MCNC: 8.4 MG/DL (ref 8.8–10.4)
CALCIUM SERPL-MCNC: 8.6 MG/DL (ref 8.8–10.4)
PHOSPHATE SERPL-MCNC: 4.3 MG/DL (ref 2.5–4.5)
T4 FREE SERPL-MCNC: 1.49 NG/DL (ref 0.9–1.7)
TSH SERPL DL<=0.005 MIU/L-ACNC: 5.4 UIU/ML (ref 0.3–4.2)

## 2024-08-14 PROCEDURE — 36415 COLL VENOUS BLD VENIPUNCTURE: CPT | Performed by: PATHOLOGY

## 2024-08-14 PROCEDURE — 84439 ASSAY OF FREE THYROXINE: CPT | Performed by: PATHOLOGY

## 2024-08-14 PROCEDURE — 82306 VITAMIN D 25 HYDROXY: CPT | Performed by: STUDENT IN AN ORGANIZED HEALTH CARE EDUCATION/TRAINING PROGRAM

## 2024-08-14 PROCEDURE — 84443 ASSAY THYROID STIM HORMONE: CPT | Performed by: PATHOLOGY

## 2024-08-14 PROCEDURE — 82040 ASSAY OF SERUM ALBUMIN: CPT | Performed by: PATHOLOGY

## 2024-08-14 PROCEDURE — 99000 SPECIMEN HANDLING OFFICE-LAB: CPT | Performed by: PATHOLOGY

## 2024-08-14 PROCEDURE — 84100 ASSAY OF PHOSPHORUS: CPT | Performed by: PATHOLOGY

## 2024-08-14 PROCEDURE — 99215 OFFICE O/P EST HI 40 MIN: CPT | Performed by: STUDENT IN AN ORGANIZED HEALTH CARE EDUCATION/TRAINING PROGRAM

## 2024-08-14 PROCEDURE — G2211 COMPLEX E/M VISIT ADD ON: HCPCS | Performed by: STUDENT IN AN ORGANIZED HEALTH CARE EDUCATION/TRAINING PROGRAM

## 2024-08-14 PROCEDURE — 82310 ASSAY OF CALCIUM: CPT | Performed by: PATHOLOGY

## 2024-08-14 ASSESSMENT — PAIN SCALES - GENERAL: PAINLEVEL: NO PAIN (0)

## 2024-08-14 NOTE — LETTER
8/14/2024       RE: Delio Agrawal  [address]  [facility] [address]     Dear Colleague,    Thank you for referring your patient, Delio Agrawal, to the [facility] ENDOCRINOLOGY CLINIC [place] at [facility]. Please see a copy of my visit note below.    07/17/24 3:45 PM  PATIENT LAB/IMAGING STATUS : MyChart sent to patient to complete standing/future orders         Endocrinology Clinic Visit 8/14/2024    NAME:  Delio Agrawal  PCP:  No Ref-Primary, Physician  MRN:  [id number]  Reason for Consult:  hypothyroidism and hypocalcemia   Requesting Provider:  Emmett Mckoy    Chief Complaint     Chief Complaint   Patient presents with    Endocrine Problem       History of Present Illness     Delio Agrawal is a 54 year old female who is seen in clinic for hypothyroidism and hypocalcemia. She was last seen by Dr. Mckoy 4/2024, she is new to me.   She is here with her  and mother.     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 The right lobe also has activity so total thyroidectomy was recommended.   On 12/1/17 she was treated with total thyroidectomy removing 139 gram multinodular goiter.  Pathology showed multinodular goiter and there was a small fragment of parathyroid tissue on the left lobectomy specimen.  Postoperative PTH was < 3 on 12/2, 12/1, 1/5/18 , < 7 on 3/6/18, 7 on 10/15/18 with calcium 7.4. . She has continued to have post surgical hypoparathyroidism.       Last visit note reported:  -Supplements: Calcium 2080 mg / day, Vitamin D 1280 international unit(s) / day  Calcitriol 0.25 mcg bid     Today she said since May 2024:  Natural ca from algae 1100 mg 1 pill day  Vitd3 1600 international unit(s) daily    Calcitriol 0.5 mcg daily , said it was increased from 0.25 mcg daily.      No more numbness, feels way better.     Most recent labs:   Latest Ref Rng  9/15/2023  3:38 PM 4/25/2024  10:57 AM 5/9/2024  4:02 PM   ENDO CALCIUM LABS-[facility]       Vitamin D Deficiency screening 20 - 75 ug/L 50      Vitamin D, Total (25-Hydroxy) 20 - 50 ng/mL  46     Albumin 3.4 - 5.0 g/dL      Albumin 3.5 - 5.2 g/dL 4.7  4.5  4.3    Alkaline Phosphatase 40 - 150 U/L      Calcium 8.8 - 10.4 mg/dL 8.0 (L)  7.2 (L)  7.8 (L)    Calcium 8.8 - 10.4 mg/dL         PTH 4/2024 was 9      # postsurgical hypothyroidism:  Compliant with her lt4. On levothyroxine 112 mcg daily.  Currently takes levothyroxine on empty stomach and waits at least 30 minutes before eating.  Does not take calcium or iron containing multivitamins within 4 hours of taking the levothyroxine tablet.  No known celiac disease.  Most recent labs 4/25/24 tsh 5.24 AND FT4 WAS 1.6       Social: She is from [place]. There is a family history of Thyroid surgery in her sister and aunt. There is no known family history of cancer.     Problem List     Patient Active Problem List   Diagnosis    CARDIOVASCULAR SCREENING; LDL GOAL LESS THAN 160    History of BCG vaccination    Status post total thyroidectomy    Postsurgical hypothyroidism    Hypocalcemia    Post-surgical hypoparathyroidism (H24)    Throat symptom        Medications     Current Outpatient Medications   Medication Sig Dispense Refill    calcitRIOL (ROCALTROL) 0.5 MCG capsule Take 1 capsule (0.5 mcg) by mouth daily 90 capsule 3    levothyroxine (SYNTHROID/LEVOTHROID) 112 MCG tablet TAKE ONE TABLET BY MOUTH ONCE DAILY 90 tablet 4     No current facility-administered medications for this visit.        Allergies     Allergies   Allergen Reactions    Flexeril [Cyclobenzaprine] Shortness Of Breath    Percocet [Oxycodone-Acetaminophen] Difficulty breathing       Medical / Surgical History     Past Medical History:   Diagnosis Date    Goiter     H. pylori infection 06/23/2021    Postoperative hypothyroidism 12/01/2017    Postsurgical hypoparathyroidism (H24) 12/01/2017    Subclinical  hyperthyroidism      Past Surgical History:   Procedure Laterality Date    THYROIDECTOMY N/A 12/01/2017    Procedure: THYROIDECTOMY;  Total Thyroidectomy of substernal goiter;  Surgeon: Keyla Griffin MD;  Location:  OR    US BREAST BIOPSY RT  2017       Social History     Social History     Socioeconomic History    Marital status:      Spouse name: Not on file    Number of children: Not on file    Years of education: Not on file    Highest education level: Not on file   Occupational History    Not on file   Tobacco Use    Smoking status: Never    Smokeless tobacco: Never   Vaping Use    Vaping status: Never Used   Substance and Sexual Activity    Alcohol use: No    Drug use: No    Sexual activity: Yes     Partners: Male     Birth control/protection: None   Other Topics Concern    Parent/sibling w/ CABG, MI or angioplasty before 65F 55M? No   Social History Narrative    How much exercise per week? Walking a lot during the week.    How much calcium per day? Organic foods only tums       How much caffeine per day? 0    How much vitamin D per day? Organic foods only     Do you/your family wear seatbelts?  Yes    Do you/your family use safety helmets? No    Do you/your family use sunscreen? No    Do you/your family keep firearms in the home? No    Do you/your family have a smoke detector(s)? Yes        Do you feel safe in your home? Yes    Has anyone ever touched you in an unwanted manner? No        Reviewed cmckim lpn 12-2,163 mg (actual weight)-2019              Social Determinants of Health     Financial Resource Strain: Not on file   Food Insecurity: Not on file   Transportation Needs: Not on file   Physical Activity: Not on file   Stress: Not on file   Social Connections: Not on file   Interpersonal Safety: Not on file   Housing Stability: Not on file       Family History     Family History   Problem Relation Age of Onset    Thyroid Disease Sister         surgery x 2    Thyroid Disease Maternal Aunt  "        surgery complicated by loss of voice.   days later    Thyroid Disease Other     Thyroid Disease Sister        ROS     12 ROS completed, pertinent positive and negative in HPI    Physical Exam   /79   Pulse 80   Wt 69.9 kg (154 lb)   LMP 2019   SpO2 100%   BMI 29.45 kg/m       GENERAL: alert and no distress  EYES: Eyes grossly normal to inspection.  No discharge or erythema, or obvious scleral/conjunctival abnormalities.  RESP: No audible wheeze, cough, or visible cyanosis.    SKIN: Visible skin clear. No significant rash, abnormal pigmentation or lesions.  NEURO: Cranial nerves grossly intact.  Mentation and speech appropriate for age.  PSYCH: Appropriate affect, tone, and pace of words      Labs/Imaging     Pertinent Labs were reviewed and updated in EPIC and discussed briefly.  Radiology Results were  reviewed and updated in EPIC and discussed briefly.    Summary of recent findings:   No results found for: \"A1C\"    TSH   Date Value Ref Range Status   2024 5.40 (H) 0.30 - 4.20 uIU/mL Final   2024 5.24 (H) 0.30 - 4.20 uIU/mL Final   2023 3.41 0.30 - 4.20 uIU/mL Final   2021 0.69 0.40 - 4.00 mU/L Final   2020 2.58 0.40 - 4.00 mU/L Final   2019 1.44 0.40 - 4.00 mU/L Final   10/15/2018 0.73 0.40 - 4.00 mU/L Final   2018 1.16 0.40 - 4.00 mU/L Final     T4 Free   Date Value Ref Range Status   2018 1.10 0.76 - 1.46 ng/dL Final   2018 1.48 (H) 0.76 - 1.46 ng/dL Final   10/09/2017 1.30 0.76 - 1.46 ng/dL Final   2016 0.97 0.76 - 1.46 ng/dL Final   2016 1.44 0.76 - 1.46 ng/dL Final     Free T4   Date Value Ref Range Status   2024 1.49 0.90 - 1.70 ng/dL Final   2024 1.60 0.90 - 1.70 ng/dL Final       Creatinine   Date Value Ref Range Status   2024 0.84 0.51 - 0.95 mg/dL Final   2021 0.77 0.52 - 1.04 mg/dL Final       Recent Labs   Lab Test 22  1546   CHOL 191   HDL 66   LDL 98   TRIG 135       No results " "found for: \"BTZD41IIFAN\", \"[id number]\", \"[id number]\"    I personally reviewed the patient's outside records from [facility] EMR . Summary of pertinent findings in HPI.    Impression / Plan     1. Postsurgical hypothyroidism  Compliant with her lt4 at 112 mcg daily. Checking TFT today.    2. Postsurgical hypoparathyroidism  3. Hypocalcemia  Had few years of hypocalcemia related to low ca intake. Her ca dose and calcitriol were adjusted on 5/2024. Goal to treat to low normal ca.   Checking labs today.      Test and/or medications prescribed today:  Orders Placed This Encounter   Procedures    TSH with free T4 reflex    Calcium    Albumin level    Phosphorus    25 Hydroxyvitamin D2 and D3         Follow up: 6 month      Kirk José MD  Endocrinology, Diabetes and Metabolism  [facility]    40 minutes spent on the date of the encounter doing chart review, history and exam, documentation and further activities per the note    The longitudinal plan of care for the diagnosis(es)/condition(s) as documented were addressed during this visit. Due to the added complexity in care, I will continue to support Delio in the subsequent management and with ongoing continuity of care.      "

## 2024-08-14 NOTE — PROGRESS NOTES
Endocrinology Clinic Visit 8/14/2024    NAME:  Delio Agrawal  PCP:  No Ref-Primary, Physician  MRN:  [id number]  Reason for Consult:  hypothyroidism and hypocalcemia   Requesting Provider:  Emmett Mckoy    Chief Complaint     Chief Complaint   Patient presents with    Endocrine Problem       History of Present Illness     Delio Agrawal is a 54 year old female who is seen in clinic for hypothyroidism and hypocalcemia. She was last seen by Dr. Mckoy 4/2024, she is new to me.   She is here with her  and mother.     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 The right lobe also has activity so total thyroidectomy was recommended.   On 12/1/17 she was treated with total thyroidectomy removing 139 gram multinodular goiter.  Pathology showed multinodular goiter and there was a small fragment of parathyroid tissue on the left lobectomy specimen.  Postoperative PTH was < 3 on 12/2, 12/1, 1/5/18 , < 7 on 3/6/18, 7 on 10/15/18 with calcium 7.4. . She has continued to have post surgical hypoparathyroidism.       Last visit note reported:  -Supplements: Calcium 2080 mg / day, Vitamin D 1280 international unit(s) / day  Calcitriol 0.25 mcg bid     Today she said since May 2024:  Natural ca from algae 1100 mg 1 pill day  Vitd3 1600 international unit(s) daily    Calcitriol 0.5 mcg daily , said it was increased from 0.25 mcg daily.      No more numbness, feels way better.     Most recent labs:   Latest Ref Rng 9/15/2023  3:38 PM 4/25/2024  10:57 AM 5/9/2024  4:02 PM   ENDO CALCIUM LABS-[facility]       Vitamin D Deficiency screening 20 - 75 ug/L 50      Vitamin D, Total (25-Hydroxy) 20 - 50 ng/mL  46     Albumin 3.4 - 5.0 g/dL      Albumin 3.5 - 5.2 g/dL 4.7  4.5  4.3    Alkaline Phosphatase 40 - 150 U/L      Calcium 8.8 - 10.4 mg/dL 8.0 (L)  7.2 (L)  7.8 (L)    Calcium 8.8 - 10.4 mg/dL         PTH 4/2024 was 9      # postsurgical  hypothyroidism:  Compliant with her lt4. On levothyroxine 112 mcg daily.  Currently takes levothyroxine on empty stomach and waits at least 30 minutes before eating.  Does not take calcium or iron containing multivitamins within 4 hours of taking the levothyroxine tablet.  No known celiac disease.  Most recent labs 4/25/24 tsh 5.24 AND FT4 WAS 1.6       Social: She is from [place]. There is a family history of Thyroid surgery in her sister and aunt. There is no known family history of cancer.     Problem List     Patient Active Problem List   Diagnosis    CARDIOVASCULAR SCREENING; LDL GOAL LESS THAN 160    History of BCG vaccination    Status post total thyroidectomy    Postsurgical hypothyroidism    Hypocalcemia    Post-surgical hypoparathyroidism (H24)    Throat symptom        Medications     Current Outpatient Medications   Medication Sig Dispense Refill    calcitRIOL (ROCALTROL) 0.5 MCG capsule Take 1 capsule (0.5 mcg) by mouth daily 90 capsule 3    levothyroxine (SYNTHROID/LEVOTHROID) 112 MCG tablet TAKE ONE TABLET BY MOUTH ONCE DAILY 90 tablet 4     No current facility-administered medications for this visit.        Allergies     Allergies   Allergen Reactions    Flexeril [Cyclobenzaprine] Shortness Of Breath    Percocet [Oxycodone-Acetaminophen] Difficulty breathing       Medical / Surgical History     Past Medical History:   Diagnosis Date    Goiter     H. pylori infection 06/23/2021    Postoperative hypothyroidism 12/01/2017    Postsurgical hypoparathyroidism (H24) 12/01/2017    Subclinical hyperthyroidism      Past Surgical History:   Procedure Laterality Date    THYROIDECTOMY N/A 12/01/2017    Procedure: THYROIDECTOMY;  Total Thyroidectomy of substernal goiter;  Surgeon: Keyla Griffin MD;  Location:  OR     BREAST BIOPSY RT  2017       Social History     Social History     Socioeconomic History    Marital status:      Spouse name: Not on file    Number of children: Not on file    Years  of education: Not on file    Highest education level: Not on file   Occupational History    Not on file   Tobacco Use    Smoking status: Never    Smokeless tobacco: Never   Vaping Use    Vaping status: Never Used   Substance and Sexual Activity    Alcohol use: No    Drug use: No    Sexual activity: Yes     Partners: Male     Birth control/protection: None   Other Topics Concern    Parent/sibling w/ CABG, MI or angioplasty before 65F 55M? No   Social History Narrative    How much exercise per week? Walking a lot during the week.    How much calcium per day? Organic foods only tums       How much caffeine per day? 0    How much vitamin D per day? Organic foods only     Do you/your family wear seatbelts?  Yes    Do you/your family use safety helmets? No    Do you/your family use sunscreen? No    Do you/your family keep firearms in the home? No    Do you/your family have a smoke detector(s)? Yes        Do you feel safe in your home? Yes    Has anyone ever touched you in an unwanted manner? No        Reviewed cmckim lpn 12-2,163 mg (actual weight)-2019              Social Determinants of Health     Financial Resource Strain: Not on file   Food Insecurity: Not on file   Transportation Needs: Not on file   Physical Activity: Not on file   Stress: Not on file   Social Connections: Not on file   Interpersonal Safety: Not on file   Housing Stability: Not on file       Family History     Family History   Problem Relation Age of Onset    Thyroid Disease Sister         surgery x 2    Thyroid Disease Maternal Aunt         surgery complicated by loss of voice.   days later    Thyroid Disease Other     Thyroid Disease Sister        ROS     12 ROS completed, pertinent positive and negative in HPI    Physical Exam   /79   Pulse 80   Wt 69.9 kg (154 lb)   LMP 2019   SpO2 100%   BMI 29.45 kg/m       GENERAL: alert and no distress  EYES: Eyes grossly normal to inspection.  No discharge or erythema, or obvious  "scleral/conjunctival abnormalities.  RESP: No audible wheeze, cough, or visible cyanosis.    SKIN: Visible skin clear. No significant rash, abnormal pigmentation or lesions.  NEURO: Cranial nerves grossly intact.  Mentation and speech appropriate for age.  PSYCH: Appropriate affect, tone, and pace of words      Labs/Imaging     Pertinent Labs were reviewed and updated in EPIC and discussed briefly.  Radiology Results were  reviewed and updated in EPIC and discussed briefly.    Summary of recent findings:   No results found for: \"A1C\"    TSH   Date Value Ref Range Status   08/14/2024 5.40 (H) 0.30 - 4.20 uIU/mL Final   04/25/2024 5.24 (H) 0.30 - 4.20 uIU/mL Final   08/24/2023 3.41 0.30 - 4.20 uIU/mL Final   06/22/2021 0.69 0.40 - 4.00 mU/L Final   06/01/2020 2.58 0.40 - 4.00 mU/L Final   04/16/2019 1.44 0.40 - 4.00 mU/L Final   10/15/2018 0.73 0.40 - 4.00 mU/L Final   06/06/2018 1.16 0.40 - 4.00 mU/L Final     T4 Free   Date Value Ref Range Status   06/06/2018 1.10 0.76 - 1.46 ng/dL Final   04/20/2018 1.48 (H) 0.76 - 1.46 ng/dL Final   10/09/2017 1.30 0.76 - 1.46 ng/dL Final   12/07/2016 0.97 0.76 - 1.46 ng/dL Final   06/01/2016 1.44 0.76 - 1.46 ng/dL Final     Free T4   Date Value Ref Range Status   08/14/2024 1.49 0.90 - 1.70 ng/dL Final   04/25/2024 1.60 0.90 - 1.70 ng/dL Final       Creatinine   Date Value Ref Range Status   04/25/2024 0.84 0.51 - 0.95 mg/dL Final   06/22/2021 0.77 0.52 - 1.04 mg/dL Final       Recent Labs   Lab Test 05/26/22  1546   CHOL 191   HDL 66   LDL 98   TRIG 135       No results found for: \"STEX12MKTEM\", \"[id number]\", \"[id number]\"    I personally reviewed the patient's outside records from [facility] EMR . Summary of pertinent findings in HPI.    Impression / Plan     1. Postsurgical hypothyroidism  Compliant with her lt4 at 112 mcg daily. Checking TFT today.    2. Postsurgical hypoparathyroidism  3. Hypocalcemia  Had few years of hypocalcemia related to low ca intake. Her ca dose and " calcitriol were adjusted on 5/2024. Goal to treat to low normal ca.   Checking labs today.      Test and/or medications prescribed today:  Orders Placed This Encounter   Procedures    TSH with free T4 reflex    Calcium    Albumin level    Phosphorus    25 Hydroxyvitamin D2 and D3         Follow up: 6 month      Kirk José MD  Endocrinology, Diabetes and Metabolism  [facility]    40 minutes spent on the date of the encounter doing chart review, history and exam, documentation and further activities per the note    The longitudinal plan of care for the diagnosis(es)/condition(s) as documented were addressed during this visit. Due to the added complexity in care, I will continue to support Delio in the subsequent management and with ongoing continuity of care.

## 2024-08-14 NOTE — NURSING NOTE
Chief Complaint   Patient presents with    Endocrine Problem     Blood pressure 127/79, pulse 80, weight 69.9 kg (154 lb), last menstrual period 09/23/2019, SpO2 100%, not currently breastfeeding.    Lyly Vang

## 2024-08-15 RX ORDER — LEVOTHYROXINE SODIUM 125 UG/1
125 TABLET ORAL DAILY
Qty: 90 TABLET | Refills: 3 | Status: SHIPPED | OUTPATIENT
Start: 2024-08-15

## 2024-08-18 LAB
DEPRECATED CALCIDIOL+CALCIFEROL SERPL-MC: <56 UG/L (ref 20–75)
VITAMIN D2 SERPL-MCNC: <5 UG/L
VITAMIN D3 SERPL-MCNC: 51 UG/L

## 2025-02-21 ENCOUNTER — LAB (OUTPATIENT)
Dept: LAB | Facility: CLINIC | Age: 55
End: 2025-02-21
Payer: COMMERCIAL

## 2025-02-21 DIAGNOSIS — E89.2 POST-SURGICAL HYPOPARATHYROIDISM: ICD-10-CM

## 2025-02-21 DIAGNOSIS — E89.0 POSTSURGICAL HYPOTHYROIDISM: ICD-10-CM

## 2025-02-21 LAB
ALBUMIN SERPL BCG-MCNC: 4.4 G/DL (ref 3.5–5.2)
ANION GAP SERPL CALCULATED.3IONS-SCNC: 10 MMOL/L (ref 7–15)
BUN SERPL-MCNC: 12.5 MG/DL (ref 6–20)
CALCIUM SERPL-MCNC: 8.5 MG/DL (ref 8.8–10.4)
CALCIUM SERPL-MCNC: 8.5 MG/DL (ref 8.8–10.4)
CHLORIDE SERPL-SCNC: 101 MMOL/L (ref 98–107)
CREAT SERPL-MCNC: 0.85 MG/DL (ref 0.51–0.95)
EGFRCR SERPLBLD CKD-EPI 2021: 81 ML/MIN/1.73M2
GLUCOSE SERPL-MCNC: 84 MG/DL (ref 70–99)
HCO3 SERPL-SCNC: 30 MMOL/L (ref 22–29)
MAGNESIUM SERPL-MCNC: 2.1 MG/DL (ref 1.7–2.3)
PHOSPHATE SERPL-MCNC: 4 MG/DL (ref 2.5–4.5)
POTASSIUM SERPL-SCNC: 3.9 MMOL/L (ref 3.4–5.3)
SODIUM SERPL-SCNC: 141 MMOL/L (ref 135–145)
T4 FREE SERPL-MCNC: 1.84 NG/DL (ref 0.9–1.7)
TSH SERPL DL<=0.005 MIU/L-ACNC: 0.24 UIU/ML (ref 0.3–4.2)
VIT B12 SERPL-MCNC: 1268 PG/ML (ref 232–1245)
VIT D+METAB SERPL-MCNC: 48 NG/ML (ref 20–50)

## 2025-02-21 PROCEDURE — 82306 VITAMIN D 25 HYDROXY: CPT | Performed by: INTERNAL MEDICINE

## 2025-02-21 PROCEDURE — 80069 RENAL FUNCTION PANEL: CPT | Performed by: PATHOLOGY

## 2025-02-21 PROCEDURE — 99000 SPECIMEN HANDLING OFFICE-LAB: CPT | Performed by: PATHOLOGY

## 2025-02-21 PROCEDURE — 36415 COLL VENOUS BLD VENIPUNCTURE: CPT | Performed by: PATHOLOGY

## 2025-02-21 PROCEDURE — 82607 VITAMIN B-12: CPT | Performed by: INTERNAL MEDICINE

## 2025-02-21 PROCEDURE — 83735 ASSAY OF MAGNESIUM: CPT | Performed by: PATHOLOGY

## 2025-02-21 PROCEDURE — 84443 ASSAY THYROID STIM HORMONE: CPT | Performed by: PATHOLOGY

## 2025-02-21 PROCEDURE — 84439 ASSAY OF FREE THYROXINE: CPT | Performed by: PATHOLOGY

## 2025-03-09 ENCOUNTER — HEALTH MAINTENANCE LETTER (OUTPATIENT)
Age: 55
End: 2025-03-09

## 2025-04-28 ENCOUNTER — MYC MEDICAL ADVICE (OUTPATIENT)
Dept: ENDOCRINOLOGY | Facility: CLINIC | Age: 55
End: 2025-04-28
Payer: COMMERCIAL

## 2025-04-28 DIAGNOSIS — E89.2 POST-SURGICAL HYPOPARATHYROIDISM: ICD-10-CM

## 2025-04-28 NOTE — TELEPHONE ENCOUNTER
Last Written Prescription:  calcitRIOL (ROCALTROL) 0.5 MCG capsule 90 capsule 3 4/25/2024 -- No   Sig - Route: Take 1 capsule (0.5 mcg) by mouth daily - Oral     ----------------------  Last Visit Date: 2/21/2025  [facility] [place]      Future Visit Date:   2/20/2026 2:00 PM (30 min)  [place]    Arrive by:  1:45 PM   RETURN ENDOCRINE   [facility] ([facility])   Andrew Olivo MD     ----------------------    Refill decision: Medication unable to be refilled by RN due to:     Medication not on refill protocol.          Request from pharmacy:  Requested Prescriptions

## 2025-04-29 RX ORDER — CALCITRIOL 0.5 UG/1
0.5 CAPSULE, LIQUID FILLED ORAL DAILY
Qty: 90 CAPSULE | Refills: 3 | Status: SHIPPED | OUTPATIENT
Start: 2025-04-29

## 2025-06-02 ENCOUNTER — OFFICE VISIT (OUTPATIENT)
Dept: URGENT CARE | Facility: URGENT CARE | Age: 55
End: 2025-06-02
Payer: COMMERCIAL

## 2025-06-02 VITALS
OXYGEN SATURATION: 100 % | WEIGHT: 149.4 LBS | RESPIRATION RATE: 18 BRPM | BODY MASS INDEX: 28.57 KG/M2 | TEMPERATURE: 97.8 F | DIASTOLIC BLOOD PRESSURE: 70 MMHG | HEART RATE: 57 BPM | SYSTOLIC BLOOD PRESSURE: 114 MMHG

## 2025-06-02 DIAGNOSIS — J02.9 ACUTE PHARYNGITIS, UNSPECIFIED ETIOLOGY: ICD-10-CM

## 2025-06-02 DIAGNOSIS — J02.0 STREP THROAT: ICD-10-CM

## 2025-06-02 LAB — DEPRECATED S PYO AG THROAT QL EIA: POSITIVE

## 2025-06-02 PROCEDURE — 87880 STREP A ASSAY W/OPTIC: CPT | Performed by: FAMILY MEDICINE

## 2025-06-02 PROCEDURE — 99213 OFFICE O/P EST LOW 20 MIN: CPT | Performed by: FAMILY MEDICINE

## 2025-06-02 PROCEDURE — 3074F SYST BP LT 130 MM HG: CPT | Performed by: FAMILY MEDICINE

## 2025-06-02 PROCEDURE — 3078F DIAST BP <80 MM HG: CPT | Performed by: FAMILY MEDICINE

## 2025-06-02 RX ORDER — AMOXICILLIN 500 MG/1
1000 CAPSULE ORAL DAILY
Qty: 20 CAPSULE | Refills: 0 | Status: SHIPPED | OUTPATIENT
Start: 2025-06-02 | End: 2025-06-12

## 2025-06-02 NOTE — PATIENT INSTRUCTIONS
We will call you if your strep test returns positive      Return as needed if symptoms worsen      Hold appointment with [facility] in case symptoms don't improve [phone] Tylenol or ibuprofen every 6 hours as needed for mild pain

## 2025-06-02 NOTE — PROGRESS NOTES
Assessment & Plan     Acute pharyngitis, unspecified etiology  Strep throat  - Streptococcus A Rapid Screen w/Reflex to PCR - Clinic Collect     Given 1 week of symptoms could very well be a viral illness -continue symptomatic treatment    I requested a strep test which was performed at the end of her visit  Throat exam did not suggest peritonsillar abscess/retropharyngeal abscess or jose antonio's    Follow-up with ENT if symptoms remain mild and persist    Amox x 10 days for strep positive test       Kwasi Craig MD   [facility]    Melissa Kebede is a 55 year old female who presents to clinic today for the following health issues:  Chief Complaint   Patient presents with    Urgent Care     One week now has been having a sore throat. Hurts from Top of throat to mid sternum. Talks a lot. Will sometimes have SOB. Uncomfortable to touch. Hurt to swallow the first. No fever, head aches, chills, body aches.  wants patient to do a strep test.     HPI    Patient reporting lower throat discomfort no difficulty with swallowing or opening her mouth has not any fevers has tried different remedies such as licorice and throat lozenges along with a dose of ibuprofen with mild improvement.  She has not had any breathing difficulties or congestion or cough.  No pain with eating.  No prior history of stomach ulcers or reflux.  Has a prior history of thyroidectomy approximately 8 years ago    Patient Active Problem List    Diagnosis Date Noted    Throat symptom 11/23/2021     Priority: Medium    Post-surgical hypoparathyroidism 04/16/2019     Priority: Medium     12/1/17  total thyroidectomy removing 139 gram multinodular goiter.  There was a small fragment of parathyroid tissue on the left lobectomy specimen.      Postoperative PTH was < 3 on 12/2, 12/1, 1/5/18 , < 7 on 3/6/18, 7 on 10/15/18 with calcium 7.4. .       Hypocalcemia 04/20/2018     Priority: Medium    Postsurgical hypothyroidism 12/02/2017      Priority: Medium     12/1/17 treated with total thyroidectomy removing 139 gram multinodular goiter.      Status post total thyroidectomy 12/01/2017     Priority: Medium    CARDIOVASCULAR SCREENING; LDL GOAL LESS THAN 160 04/03/2015     Priority: Medium    History of BCG vaccination 04/03/2015     Priority: Medium       Current Outpatient Medications   Medication Sig Dispense Refill    amoxicillin (AMOXIL) 500 MG capsule Take 2 capsules (1,000 mg) by mouth daily for 10 days. 20 capsule 0    calcitRIOL (ROCALTROL) 0.5 MCG capsule Take 1 capsule (0.5 mcg) by mouth daily. 90 capsule 3    calcitRIOL (ROCALTROL) 0.5 MCG capsule Take 1 capsule (0.5 mcg) by mouth daily. 90 capsule 3    levothyroxine (SYNTHROID/LEVOTHROID) 125 MCG tablet Take 1 tablet (125 mcg) by mouth daily 90 tablet 3    levothyroxine (SYNTHROID/LEVOTHROID) 125 MCG tablet Take 1 tablet (125 mcg) by mouth daily 90 tablet 3     No current facility-administered medications for this visit.           Objective    /70 (BP Location: Right arm)   Pulse 57   Temp 97.8  F (36.6  C) (Tympanic)   Resp 18   Wt 67.8 kg (149 lb 6.4 oz)   LMP 09/23/2019   SpO2 100%   BMI 28.57 kg/m    Physical Exam   As noted above and including:     No erythema of the oropharynx, no lesions of the mouth.  No elevation of the floor of mouth.  No lymphadenopathy of the neck, no abnormal masses.  Normal phonation.    Nonlabored work of breathing     normal mentation  Results for orders placed or performed in visit on 06/02/25   Streptococcus A Rapid Screen w/Reflex to PCR - Clinic Collect     Status: Abnormal    Specimen: Throat; Swab   Result Value Ref Range    Group A Strep antigen Positive (A) Negative                     The use of Dragon/Ninjath[employer] services may have been used to construct the content in this note; any grammatical or spelling errors are non-intentional. Please contact the author of this note directly if you are in need of any clarification.

## 2025-06-02 NOTE — PROGRESS NOTES
Urgent Care Clinic Visit    Chief Complaint   Patient presents with    Urgent Care     One week now has been having a sore throat. Hurts from Top of throat to mid sternum. Talks a lot. Will sometimes have SOB. Uncomfortable to touch. Hurt to swallow the first. No fever, head aches, chills, body aches.                6/2/2025     3:22 PM   Additional Questions   Roomed by Saundra   Accompanied by      No  Does the patient have a sore throat and either history of fever >100.4 in the previous 24 hours without a cough or recent exposure to a known case of strep throat? No

## 2025-06-24 ENCOUNTER — OFFICE VISIT (OUTPATIENT)
Dept: PEDIATRICS | Facility: CLINIC | Age: 55
End: 2025-06-24
Payer: COMMERCIAL

## 2025-06-24 VITALS
SYSTOLIC BLOOD PRESSURE: 94 MMHG | HEART RATE: 68 BPM | DIASTOLIC BLOOD PRESSURE: 56 MMHG | BODY MASS INDEX: 28.55 KG/M2 | OXYGEN SATURATION: 96 % | RESPIRATION RATE: 16 BRPM | TEMPERATURE: 96.8 F | HEIGHT: 61 IN | WEIGHT: 151.2 LBS

## 2025-06-24 DIAGNOSIS — Z90.89 STATUS POST TOTAL THYROIDECTOMY: ICD-10-CM

## 2025-06-24 DIAGNOSIS — R53.83 OTHER FATIGUE: ICD-10-CM

## 2025-06-24 DIAGNOSIS — E89.2 POST-SURGICAL HYPOPARATHYROIDISM: ICD-10-CM

## 2025-06-24 DIAGNOSIS — E89.0 POSTSURGICAL HYPOTHYROIDISM: ICD-10-CM

## 2025-06-24 DIAGNOSIS — Z76.89 ENCOUNTER TO ESTABLISH CARE: Primary | ICD-10-CM

## 2025-06-24 DIAGNOSIS — Z98.890 STATUS POST TOTAL THYROIDECTOMY: ICD-10-CM

## 2025-06-24 LAB
CA-I BLD-MCNC: 4.3 MG/DL (ref 4.4–5.2)
HGB BLD-MCNC: 12.8 G/DL (ref 11.7–15.7)
MCV RBC AUTO: 94 FL (ref 78–100)

## 2025-06-24 PROCEDURE — 3078F DIAST BP <80 MM HG: CPT | Performed by: STUDENT IN AN ORGANIZED HEALTH CARE EDUCATION/TRAINING PROGRAM

## 2025-06-24 PROCEDURE — 80053 COMPREHEN METABOLIC PANEL: CPT | Performed by: STUDENT IN AN ORGANIZED HEALTH CARE EDUCATION/TRAINING PROGRAM

## 2025-06-24 PROCEDURE — 83540 ASSAY OF IRON: CPT | Performed by: STUDENT IN AN ORGANIZED HEALTH CARE EDUCATION/TRAINING PROGRAM

## 2025-06-24 PROCEDURE — 82330 ASSAY OF CALCIUM: CPT | Performed by: STUDENT IN AN ORGANIZED HEALTH CARE EDUCATION/TRAINING PROGRAM

## 2025-06-24 PROCEDURE — 82728 ASSAY OF FERRITIN: CPT | Performed by: STUDENT IN AN ORGANIZED HEALTH CARE EDUCATION/TRAINING PROGRAM

## 2025-06-24 PROCEDURE — 85018 HEMOGLOBIN: CPT | Performed by: STUDENT IN AN ORGANIZED HEALTH CARE EDUCATION/TRAINING PROGRAM

## 2025-06-24 PROCEDURE — 36415 COLL VENOUS BLD VENIPUNCTURE: CPT | Performed by: STUDENT IN AN ORGANIZED HEALTH CARE EDUCATION/TRAINING PROGRAM

## 2025-06-24 PROCEDURE — 84439 ASSAY OF FREE THYROXINE: CPT | Performed by: STUDENT IN AN ORGANIZED HEALTH CARE EDUCATION/TRAINING PROGRAM

## 2025-06-24 PROCEDURE — 99214 OFFICE O/P EST MOD 30 MIN: CPT | Performed by: STUDENT IN AN ORGANIZED HEALTH CARE EDUCATION/TRAINING PROGRAM

## 2025-06-24 PROCEDURE — 84443 ASSAY THYROID STIM HORMONE: CPT | Performed by: STUDENT IN AN ORGANIZED HEALTH CARE EDUCATION/TRAINING PROGRAM

## 2025-06-24 PROCEDURE — 3074F SYST BP LT 130 MM HG: CPT | Performed by: STUDENT IN AN ORGANIZED HEALTH CARE EDUCATION/TRAINING PROGRAM

## 2025-06-24 PROCEDURE — 1126F AMNT PAIN NOTED NONE PRSNT: CPT | Performed by: STUDENT IN AN ORGANIZED HEALTH CARE EDUCATION/TRAINING PROGRAM

## 2025-06-24 PROCEDURE — 83550 IRON BINDING TEST: CPT | Performed by: STUDENT IN AN ORGANIZED HEALTH CARE EDUCATION/TRAINING PROGRAM

## 2025-06-24 PROCEDURE — 84100 ASSAY OF PHOSPHORUS: CPT | Performed by: STUDENT IN AN ORGANIZED HEALTH CARE EDUCATION/TRAINING PROGRAM

## 2025-06-24 PROCEDURE — 83970 ASSAY OF PARATHORMONE: CPT | Performed by: STUDENT IN AN ORGANIZED HEALTH CARE EDUCATION/TRAINING PROGRAM

## 2025-06-24 ASSESSMENT — PAIN SCALES - GENERAL: PAINLEVEL_OUTOF10: NO PAIN (0)

## 2025-06-24 NOTE — PROGRESS NOTES
"  Assessment & Plan     Encounter to establish care  Patient here with  today to establish care. Major concerns were fatigue and checking labs for Calcium and iron. Also had concerns about complications from surgery in 2017. Initially was difficult to discern what specialists patient has been seeing but after a long discussion about differences between endocrinology, ENT I recommended she meet with an ENT given her concern for vocal cord dysfunction to see if scope indicated.  Declined cancer screenings and vaccines. I recommend annual physical.     Postsurgical hypothyroidism  Follows with endocrinology.  - Adult ENT  Referral; Future  - TSH with free T4 reflex; Future    Status post total thyroidectomy  Follows with endocrinology.  - Iron and iron binding capacity; Future  - Parathyroid Hormone Intact; Future  - Phosphorus; Future  - Ionized Calcium; Future  - TSH with free T4 reflex; Future    Other fatigue  Check labs per below.  - Comprehensive metabolic panel (BMP + Alb, Alk Phos, ALT, AST, Total. Bili, TP); Future  - Ferritin; Future  - Hemoglobin; Future  - TSH with free T4 reflex; Future    Post-surgical hypoparathyroidism  Follows with endocrinology. On Calcium and vitamin D supplementation.  - Parathyroid Hormone Intact; Future  - Phosphorus; Future  - Ionized Calcium; Future        MED REC REQUIRED  Post Medication Reconciliation Status:  Patient was not discharged from an inpatient facility or TCU  BMI  Estimated body mass index is 28.95 kg/m  as calculated from the following:    Height as of this encounter: 1.539 m (5' 0.6\").    Weight as of this encounter: 68.6 kg (151 lb 3.2 oz).             Melissa Kebede is a 55 year old, presenting for the following health issues:  Establish Care and Urgent Care follow up      6/24/2025     1:34 PM   Additional Questions   Roomed by Margoth   Accompanied by spouse         6/24/2025     1:34 PM   Patient Reported Additional Medications   Patient " "reports taking the following new medications none     HPI        ED/UC Followup:    Facility:  [facility]  Date of visit: 6/2/25  Reason for visit: Strep throat  Current Status: sore throat gone    Here to establish care  Numbness in feet  Low energy  Wants to check Calcium and iron  Voice cords get tired  -was treated for Strep throat for urgent care    Had thyroid surgery in 2017 and concerned about complications from it affecting voice    Taking vitamin D3 [employer] unit(s) weekly  Taking calcitriol BID not daily    Walks for exercise              Objective    BP 94/56 (BP Location: Right arm, Patient Position: Sitting, Cuff Size: Adult Regular)   Pulse 68   Temp 96.8  F (36  C) (Temporal)   Resp 16   Ht 1.539 m (5' 0.6\")   Wt 68.6 kg (151 lb 3.2 oz)   LMP 09/23/2019 (Exact Date)   SpO2 96%   BMI 28.95 kg/m    Body mass index is 28.95 kg/m .  Physical Exam   GEN: Alert and appropriately interactive for age  EYES: Eyes grossly normal to inspection, conjunctivae and sclerae normal  RESP: Breathing comfortably on room air   CV: Warm and well perfused peripheral extremities   MS: no gross musculoskeletal defects noted, no edema  NEURO: Alert and oriented. Gait normal. Speech fluent.    PSYCH: Affect normal/bright. Appearance well groomed.             I spent 32 minutes with the patient, greater than 50% of that time was spent in counseling or coordination of the above issues.    Signed Electronically by: Deirdre E. Milligan, MD    "

## 2025-06-25 ENCOUNTER — PATIENT OUTREACH (OUTPATIENT)
Dept: CARE COORDINATION | Facility: CLINIC | Age: 55
End: 2025-06-25
Payer: COMMERCIAL

## 2025-06-25 LAB
ALBUMIN SERPL BCG-MCNC: 4.4 G/DL (ref 3.5–5.2)
ALP SERPL-CCNC: 68 U/L (ref 40–150)
ALT SERPL W P-5'-P-CCNC: 23 U/L (ref 0–50)
ANION GAP SERPL CALCULATED.3IONS-SCNC: 15 MMOL/L (ref 7–15)
AST SERPL W P-5'-P-CCNC: 37 U/L (ref 0–45)
BILIRUB SERPL-MCNC: 0.2 MG/DL
BUN SERPL-MCNC: 18.9 MG/DL (ref 6–20)
CALCIUM SERPL-MCNC: 9 MG/DL (ref 8.8–10.4)
CHLORIDE SERPL-SCNC: 99 MMOL/L (ref 98–107)
CREAT SERPL-MCNC: 0.9 MG/DL (ref 0.51–0.95)
EGFRCR SERPLBLD CKD-EPI 2021: 75 ML/MIN/1.73M2
FERRITIN SERPL-MCNC: 54 NG/ML (ref 11–328)
GLUCOSE SERPL-MCNC: 83 MG/DL (ref 70–99)
HCO3 SERPL-SCNC: 27 MMOL/L (ref 22–29)
IRON BINDING CAPACITY (ROCHE): 263 UG/DL (ref 240–430)
IRON SATN MFR SERPL: 22 % (ref 15–46)
IRON SERPL-MCNC: 57 UG/DL (ref 37–145)
PHOSPHATE SERPL-MCNC: 5.4 MG/DL (ref 2.5–4.5)
POTASSIUM SERPL-SCNC: 4.6 MMOL/L (ref 3.4–5.3)
PROT SERPL-MCNC: 7.3 G/DL (ref 6.4–8.3)
PTH-INTACT SERPL-MCNC: 10 PG/ML (ref 15–65)
SODIUM SERPL-SCNC: 141 MMOL/L (ref 135–145)
T4 FREE SERPL-MCNC: 2.05 NG/DL (ref 0.9–1.7)
TSH SERPL DL<=0.005 MIU/L-ACNC: 0.11 UIU/ML (ref 0.3–4.2)

## 2025-06-27 ENCOUNTER — RESULTS FOLLOW-UP (OUTPATIENT)
Dept: PEDIATRICS | Facility: CLINIC | Age: 55
End: 2025-06-27

## 2025-07-09 NOTE — TELEPHONE ENCOUNTER
Outpatient Medication Detail     Disp Refills Start End ROHAN   calcitRIOL (ROCALTROL) 0.5 MCG capsule 90 capsule 1 7/9/2025 -- No   Sig - Route: Take 1 capsule (0.5 mcg) by mouth daily. - Oral   Sent to pharmacy as: Calcitriol 0.5 MCG Oral Capsule (ROCALTROL)   Class: E-Prescribe   Order: [id number]   E-Prescribing Status: Receipt confirmed by pharmacy (7/9/2025  3:39 PM CDT)     Printout Tracking    External Result Report     Pharmacy    [place], [place] - [address]

## 2025-07-19 ENCOUNTER — HEALTH MAINTENANCE LETTER (OUTPATIENT)
Age: 55
End: 2025-07-19

## 2025-09-02 DIAGNOSIS — E89.2 POST-SURGICAL HYPOPARATHYROIDISM: ICD-10-CM

## 2025-09-02 RX ORDER — CALCITRIOL 0.5 UG/1
0.5 CAPSULE, LIQUID FILLED ORAL DAILY
Qty: 90 CAPSULE | Refills: 3 | OUTPATIENT
Start: 2025-09-02

## (undated) DEVICE — SU SILK 3-0 TIE 12X30" A304H

## (undated) DEVICE — SURGICEL ABSORBABLE HEMOSTAT SNOW 4"X4" 2083

## (undated) DEVICE — CATH TRAY FOLEY SURESTEP 16FR W/URNE MTR STLK LATEX A303316A

## (undated) DEVICE — SURGICEL FIBRILLAR HEMOSTAT 4"X4" 1963

## (undated) DEVICE — LINEN TOWEL PACK X6 WHITE 5487

## (undated) DEVICE — NIM ELEC SUBDERMAL NDL 3PAIR/BOX

## (undated) DEVICE — SU SILK 0 TIE 6X30" A306H

## (undated) DEVICE — SU ETHILON 3-0 PS-1 18" 1663H

## (undated) DEVICE — SPONGE KITTNER 30-101

## (undated) DEVICE — PREP PAD ALCOHOL 6818

## (undated) DEVICE — DRAIN JACKSON PRATT RESERVOIR 100ML SU130-1305

## (undated) DEVICE — SU SILK 4-0 TIE 12X30" A303H

## (undated) DEVICE — PACK NEURO MINOR UMMC SNE32MNMU4

## (undated) DEVICE — TUBE ENDOTRACHEAL NIM TRIVANTAGE 7.0MM 8229707

## (undated) DEVICE — SU MONOCRYL 5-0 P-3 18" UND Y493G

## (undated) DEVICE — LINEN TOWEL PACK X5 5464

## (undated) DEVICE — ADHESIVE SWIFTSET 0.8ML OCTYL SS6

## (undated) DEVICE — NIM PROBE PRASS INCREMENTING TIP 8225825

## (undated) DEVICE — GLOVE PROTEXIS W/NEU-THERA 6.5  2D73TE65

## (undated) DEVICE — CLIP HORIZON SM RED WIDE SLOT 001201

## (undated) DEVICE — SU CHROMIC 3-0 SH 27" G122H

## (undated) DEVICE — WIPE PREMOIST CLEANSING WASHCLOTHS 7988

## (undated) DEVICE — DRAIN JACKSON PRATT CHANNEL 15FR ROUND HUBLESS SIL JP-2228

## (undated) DEVICE — ESU GROUND PAD ADULT W/CORD E7507

## (undated) DEVICE — CLIP HORIZON MED BLUE 002200

## (undated) DEVICE — PREP CHLORAPREP 26ML TINTED ORANGE  260815

## (undated) DEVICE — ESU ELEC BLADE 2.75" COATED/INSULATED E1455

## (undated) DEVICE — SU SILK 2-0 TIE 12X30" A305H

## (undated) DEVICE — ESU PENCIL SMOKE EVAC W/ROCKER SWITCH 0703-047-000

## (undated) DEVICE — SU VICRYL 3-0 SH 27" UND J416H

## (undated) DEVICE — ESU LIGASURE OPEN SEALER/DIVIDER SM JAW 16.5MM LF1212A

## (undated) DEVICE — SUCTION SLEEVE NEPTUNE 2 125MM 0703-005-125

## (undated) DEVICE — SOL NACL 0.9% IRRIG 1000ML BOTTLE 2F7124

## (undated) RX ORDER — LIDOCAINE HYDROCHLORIDE 20 MG/ML
INJECTION, SOLUTION EPIDURAL; INFILTRATION; INTRACAUDAL; PERINEURAL
Status: DISPENSED
Start: 2017-12-01

## (undated) RX ORDER — SODIUM CHLORIDE, SODIUM LACTATE, POTASSIUM CHLORIDE, CALCIUM CHLORIDE 600; 310; 30; 20 MG/100ML; MG/100ML; MG/100ML; MG/100ML
INJECTION, SOLUTION INTRAVENOUS
Status: DISPENSED
Start: 2017-12-01

## (undated) RX ORDER — FENTANYL CITRATE 50 UG/ML
INJECTION, SOLUTION INTRAMUSCULAR; INTRAVENOUS
Status: DISPENSED
Start: 2017-12-01

## (undated) RX ORDER — PROPOFOL 10 MG/ML
INJECTION, EMULSION INTRAVENOUS
Status: DISPENSED
Start: 2017-12-01

## (undated) RX ORDER — ONDANSETRON 2 MG/ML
INJECTION INTRAMUSCULAR; INTRAVENOUS
Status: DISPENSED
Start: 2017-12-01

## (undated) RX ORDER — ROCURONIUM BROMIDE 50 MG/5 ML
SYRINGE (ML) INTRAVENOUS
Status: DISPENSED
Start: 2017-12-01

## (undated) RX ORDER — GLYCOPYRROLATE 0.2 MG/ML
INJECTION, SOLUTION INTRAMUSCULAR; INTRAVENOUS
Status: DISPENSED
Start: 2017-12-01

## (undated) RX ORDER — EPHEDRINE SULFATE 50 MG/ML
INJECTION, SOLUTION INTRAMUSCULAR; INTRAVENOUS; SUBCUTANEOUS
Status: DISPENSED
Start: 2017-12-01

## (undated) RX ORDER — HYDROMORPHONE HCL/0.9% NACL/PF 0.2MG/0.2
SYRINGE (ML) INTRAVENOUS
Status: DISPENSED
Start: 2017-12-01

## (undated) RX ORDER — ESMOLOL HYDROCHLORIDE 10 MG/ML
INJECTION INTRAVENOUS
Status: DISPENSED
Start: 2017-12-01

## (undated) RX ORDER — HYDROMORPHONE HYDROCHLORIDE 1 MG/ML
INJECTION, SOLUTION INTRAMUSCULAR; INTRAVENOUS; SUBCUTANEOUS
Status: DISPENSED
Start: 2017-12-01

## (undated) RX ORDER — DIPHENHYDRAMINE HYDROCHLORIDE 50 MG/ML
INJECTION INTRAMUSCULAR; INTRAVENOUS
Status: DISPENSED
Start: 2017-12-01

## (undated) RX ORDER — SCOLOPAMINE TRANSDERMAL SYSTEM 1 MG/1
PATCH, EXTENDED RELEASE TRANSDERMAL
Status: DISPENSED
Start: 2017-12-01

## (undated) RX ORDER — DEXAMETHASONE SODIUM PHOSPHATE 4 MG/ML
INJECTION, SOLUTION INTRA-ARTICULAR; INTRALESIONAL; INTRAMUSCULAR; INTRAVENOUS; SOFT TISSUE
Status: DISPENSED
Start: 2017-12-01